# Patient Record
Sex: FEMALE | Race: WHITE | NOT HISPANIC OR LATINO
[De-identification: names, ages, dates, MRNs, and addresses within clinical notes are randomized per-mention and may not be internally consistent; named-entity substitution may affect disease eponyms.]

---

## 2018-11-28 ENCOUNTER — APPOINTMENT (OUTPATIENT)
Dept: GYNECOLOGIC ONCOLOGY | Facility: CLINIC | Age: 68
End: 2018-11-28
Payer: MEDICARE

## 2018-11-28 VITALS
HEART RATE: 48 BPM | BODY MASS INDEX: 27.51 KG/M2 | SYSTOLIC BLOOD PRESSURE: 148 MMHG | WEIGHT: 149.5 LBS | DIASTOLIC BLOOD PRESSURE: 69 MMHG | HEIGHT: 62 IN | OXYGEN SATURATION: 93 %

## 2018-11-28 DIAGNOSIS — N83.202 UNSPECIFIED OVARIAN CYST, LEFT SIDE: ICD-10-CM

## 2018-11-28 DIAGNOSIS — Z87.39 PERSONAL HISTORY OF OTHER DISEASES OF THE MUSCULOSKELETAL SYSTEM AND CONNECTIVE TISSUE: ICD-10-CM

## 2018-11-28 DIAGNOSIS — Z86.79 PERSONAL HISTORY OF OTHER DISEASES OF THE CIRCULATORY SYSTEM: ICD-10-CM

## 2018-11-28 DIAGNOSIS — Z78.9 OTHER SPECIFIED HEALTH STATUS: ICD-10-CM

## 2018-11-28 DIAGNOSIS — Z80.3 FAMILY HISTORY OF MALIGNANT NEOPLASM OF BREAST: ICD-10-CM

## 2018-11-28 PROCEDURE — 99205 OFFICE O/P NEW HI 60 MIN: CPT

## 2019-01-10 PROBLEM — Z80.3 FAMILY HISTORY OF MALIGNANT NEOPLASM OF BREAST: Status: ACTIVE | Noted: 2019-01-10

## 2019-01-10 PROBLEM — Z78.9 NO HISTORY OF ALCOHOL USE: Status: ACTIVE | Noted: 2019-01-10

## 2019-01-10 PROBLEM — Z87.39 HISTORY OF OSTEOPENIA: Status: RESOLVED | Noted: 2019-01-10 | Resolved: 2019-01-10

## 2019-01-10 PROBLEM — Z86.79 HISTORY OF HYPERTENSION: Status: RESOLVED | Noted: 2019-01-10 | Resolved: 2019-01-10

## 2019-06-20 ENCOUNTER — OUTPATIENT (OUTPATIENT)
Dept: OUTPATIENT SERVICES | Facility: HOSPITAL | Age: 69
LOS: 1 days | Discharge: HOME | End: 2019-06-20

## 2019-06-20 ENCOUNTER — RESULT REVIEW (OUTPATIENT)
Age: 69
End: 2019-06-20

## 2019-06-20 RX ORDER — KETOROLAC TROMETHAMINE 30 MG/ML
1 SYRINGE (ML) INJECTION
Qty: 12 | Refills: 0
Start: 2019-06-20 | End: 2019-06-23

## 2019-06-26 LAB — SURGICAL PATHOLOGY STUDY: SIGNIFICANT CHANGE UP

## 2019-11-23 ENCOUNTER — TRANSCRIPTION ENCOUNTER (OUTPATIENT)
Age: 69
End: 2019-11-23

## 2020-01-07 ENCOUNTER — INPATIENT (INPATIENT)
Facility: HOSPITAL | Age: 70
LOS: 2 days | Discharge: HOME | End: 2020-01-10
Attending: INTERNAL MEDICINE | Admitting: INTERNAL MEDICINE
Payer: MEDICARE

## 2020-01-07 VITALS
OXYGEN SATURATION: 95 % | TEMPERATURE: 101 F | WEIGHT: 145.06 LBS | HEART RATE: 154 BPM | SYSTOLIC BLOOD PRESSURE: 109 MMHG | DIASTOLIC BLOOD PRESSURE: 70 MMHG | RESPIRATION RATE: 20 BRPM

## 2020-01-07 DIAGNOSIS — Z90.49 ACQUIRED ABSENCE OF OTHER SPECIFIED PARTS OF DIGESTIVE TRACT: Chronic | ICD-10-CM

## 2020-01-07 LAB
ALBUMIN SERPL ELPH-MCNC: 4.3 G/DL — SIGNIFICANT CHANGE UP (ref 3.5–5.2)
ALP SERPL-CCNC: 45 U/L — SIGNIFICANT CHANGE UP (ref 30–115)
ALT FLD-CCNC: 17 U/L — SIGNIFICANT CHANGE UP (ref 0–41)
ANION GAP SERPL CALC-SCNC: 17 MMOL/L — HIGH (ref 7–14)
APPEARANCE UR: CLEAR — SIGNIFICANT CHANGE UP
APTT BLD: 34.2 SEC — SIGNIFICANT CHANGE UP (ref 27–39.2)
AST SERPL-CCNC: 36 U/L — SIGNIFICANT CHANGE UP (ref 0–41)
BACTERIA # UR AUTO: ABNORMAL
BASE EXCESS BLDV CALC-SCNC: 2.1 MMOL/L — HIGH (ref -2–2)
BASOPHILS # BLD AUTO: 0.04 K/UL — SIGNIFICANT CHANGE UP (ref 0–0.2)
BASOPHILS NFR BLD AUTO: 0.3 % — SIGNIFICANT CHANGE UP (ref 0–1)
BILIRUB SERPL-MCNC: 0.5 MG/DL — SIGNIFICANT CHANGE UP (ref 0.2–1.2)
BILIRUB UR-MCNC: NEGATIVE — SIGNIFICANT CHANGE UP
BUN SERPL-MCNC: 15 MG/DL — SIGNIFICANT CHANGE UP (ref 10–20)
CA-I SERPL-SCNC: 1.17 MMOL/L — SIGNIFICANT CHANGE UP (ref 1.12–1.3)
CALCIUM SERPL-MCNC: 9 MG/DL — SIGNIFICANT CHANGE UP (ref 8.5–10.1)
CHLORIDE SERPL-SCNC: 100 MMOL/L — SIGNIFICANT CHANGE UP (ref 98–110)
CO2 SERPL-SCNC: 21 MMOL/L — SIGNIFICANT CHANGE UP (ref 17–32)
COLOR SPEC: YELLOW — SIGNIFICANT CHANGE UP
CREAT SERPL-MCNC: 0.9 MG/DL — SIGNIFICANT CHANGE UP (ref 0.7–1.5)
DIFF PNL FLD: SIGNIFICANT CHANGE UP
EOSINOPHIL # BLD AUTO: 0 K/UL — SIGNIFICANT CHANGE UP (ref 0–0.7)
EOSINOPHIL NFR BLD AUTO: 0 % — SIGNIFICANT CHANGE UP (ref 0–8)
EPI CELLS # UR: 4 /HPF — SIGNIFICANT CHANGE UP (ref 0–5)
FLU A RESULT: NEGATIVE — SIGNIFICANT CHANGE UP
FLU A RESULT: NEGATIVE — SIGNIFICANT CHANGE UP
FLUAV AG NPH QL: NEGATIVE — SIGNIFICANT CHANGE UP
FLUBV AG NPH QL: NEGATIVE — SIGNIFICANT CHANGE UP
GAS PNL BLDV: 139 MMOL/L — SIGNIFICANT CHANGE UP (ref 136–145)
GAS PNL BLDV: SIGNIFICANT CHANGE UP
GAS PNL BLDV: SIGNIFICANT CHANGE UP
GLUCOSE SERPL-MCNC: 133 MG/DL — HIGH (ref 70–99)
GLUCOSE UR QL: NEGATIVE — SIGNIFICANT CHANGE UP
HCO3 BLDV-SCNC: 28 MMOL/L — SIGNIFICANT CHANGE UP (ref 22–29)
HCT VFR BLD CALC: 44.4 % — SIGNIFICANT CHANGE UP (ref 37–47)
HCT VFR BLDA CALC: 47 % — HIGH (ref 34–44)
HGB BLD CALC-MCNC: 15.3 G/DL — SIGNIFICANT CHANGE UP (ref 14–18)
HGB BLD-MCNC: 15.1 G/DL — SIGNIFICANT CHANGE UP (ref 12–16)
HYALINE CASTS # UR AUTO: 4 /LPF — SIGNIFICANT CHANGE UP (ref 0–7)
IMM GRANULOCYTES NFR BLD AUTO: 0.7 % — HIGH (ref 0.1–0.3)
INR BLD: 1.12 RATIO — SIGNIFICANT CHANGE UP (ref 0.65–1.3)
KETONES UR-MCNC: NEGATIVE — SIGNIFICANT CHANGE UP
LACTATE BLDV-MCNC: 2 MMOL/L — HIGH (ref 0.5–1.6)
LACTATE SERPL-SCNC: 1.5 MMOL/L — SIGNIFICANT CHANGE UP (ref 0.7–2)
LEUKOCYTE ESTERASE UR-ACNC: ABNORMAL
LYMPHOCYTES # BLD AUTO: 1.04 K/UL — LOW (ref 1.2–3.4)
LYMPHOCYTES # BLD AUTO: 8.4 % — LOW (ref 20.5–51.1)
MCHC RBC-ENTMCNC: 30.4 PG — SIGNIFICANT CHANGE UP (ref 27–31)
MCHC RBC-ENTMCNC: 34 G/DL — SIGNIFICANT CHANGE UP (ref 32–37)
MCV RBC AUTO: 89.3 FL — SIGNIFICANT CHANGE UP (ref 81–99)
MONOCYTES # BLD AUTO: 0.64 K/UL — HIGH (ref 0.1–0.6)
MONOCYTES NFR BLD AUTO: 5.1 % — SIGNIFICANT CHANGE UP (ref 1.7–9.3)
NEUTROPHILS # BLD AUTO: 10.62 K/UL — HIGH (ref 1.4–6.5)
NEUTROPHILS NFR BLD AUTO: 85.5 % — HIGH (ref 42.2–75.2)
NITRITE UR-MCNC: NEGATIVE — SIGNIFICANT CHANGE UP
NRBC # BLD: 0 /100 WBCS — SIGNIFICANT CHANGE UP (ref 0–0)
NT-PROBNP SERPL-SCNC: 2214 PG/ML — HIGH (ref 0–300)
PCO2 BLDV: 47 MMHG — SIGNIFICANT CHANGE UP (ref 41–51)
PH BLDV: 7.38 — SIGNIFICANT CHANGE UP (ref 7.26–7.43)
PH UR: 6.5 — SIGNIFICANT CHANGE UP (ref 5–8)
PLATELET # BLD AUTO: 208 K/UL — SIGNIFICANT CHANGE UP (ref 130–400)
PO2 BLDV: 25 MMHG — SIGNIFICANT CHANGE UP (ref 20–40)
POTASSIUM BLDV-SCNC: 3.7 MMOL/L — SIGNIFICANT CHANGE UP (ref 3.3–5.6)
POTASSIUM SERPL-MCNC: 4.1 MMOL/L — SIGNIFICANT CHANGE UP (ref 3.5–5)
POTASSIUM SERPL-SCNC: 4.1 MMOL/L — SIGNIFICANT CHANGE UP (ref 3.5–5)
PROT SERPL-MCNC: 6.7 G/DL — SIGNIFICANT CHANGE UP (ref 6–8)
PROT UR-MCNC: ABNORMAL
PROTHROM AB SERPL-ACNC: 12.9 SEC — HIGH (ref 9.95–12.87)
RBC # BLD: 4.97 M/UL — SIGNIFICANT CHANGE UP (ref 4.2–5.4)
RBC # FLD: 12.5 % — SIGNIFICANT CHANGE UP (ref 11.5–14.5)
RBC CASTS # UR COMP ASSIST: 2 /HPF — SIGNIFICANT CHANGE UP (ref 0–4)
RSV RESULT: NEGATIVE — SIGNIFICANT CHANGE UP
RSV RNA RESP QL NAA+PROBE: NEGATIVE — SIGNIFICANT CHANGE UP
SAO2 % BLDV: 45 % — SIGNIFICANT CHANGE UP
SODIUM SERPL-SCNC: 138 MMOL/L — SIGNIFICANT CHANGE UP (ref 135–146)
SP GR SPEC: 1.01 — SIGNIFICANT CHANGE UP (ref 1.01–1.02)
TROPONIN T SERPL-MCNC: <0.01 NG/ML — SIGNIFICANT CHANGE UP
UROBILINOGEN FLD QL: SIGNIFICANT CHANGE UP
WBC # BLD: 12.43 K/UL — HIGH (ref 4.8–10.8)
WBC # FLD AUTO: 12.43 K/UL — HIGH (ref 4.8–10.8)
WBC UR QL: 5 /HPF — SIGNIFICANT CHANGE UP (ref 0–5)

## 2020-01-07 PROCEDURE — 93010 ELECTROCARDIOGRAM REPORT: CPT | Mod: 76

## 2020-01-07 PROCEDURE — 71045 X-RAY EXAM CHEST 1 VIEW: CPT | Mod: 26

## 2020-01-07 PROCEDURE — 93306 TTE W/DOPPLER COMPLETE: CPT | Mod: 26

## 2020-01-07 PROCEDURE — 99291 CRITICAL CARE FIRST HOUR: CPT

## 2020-01-07 PROCEDURE — 99222 1ST HOSP IP/OBS MODERATE 55: CPT | Mod: AI,GC

## 2020-01-07 PROCEDURE — 99291 CRITICAL CARE FIRST HOUR: CPT | Mod: GC

## 2020-01-07 RX ORDER — SODIUM CHLORIDE 9 MG/ML
2000 INJECTION, SOLUTION INTRAVENOUS ONCE
Refills: 0 | Status: COMPLETED | OUTPATIENT
Start: 2020-01-07 | End: 2020-01-07

## 2020-01-07 RX ORDER — SODIUM CHLORIDE 9 MG/ML
1000 INJECTION, SOLUTION INTRAVENOUS ONCE
Refills: 0 | Status: COMPLETED | OUTPATIENT
Start: 2020-01-07 | End: 2020-01-07

## 2020-01-07 RX ORDER — DILTIAZEM HCL 120 MG
5 CAPSULE, EXT RELEASE 24 HR ORAL
Qty: 125 | Refills: 0 | Status: DISCONTINUED | OUTPATIENT
Start: 2020-01-07 | End: 2020-01-07

## 2020-01-07 RX ORDER — CEFTRIAXONE 500 MG/1
1000 INJECTION, POWDER, FOR SOLUTION INTRAMUSCULAR; INTRAVENOUS EVERY 24 HOURS
Refills: 0 | Status: DISCONTINUED | OUTPATIENT
Start: 2020-01-08 | End: 2020-01-08

## 2020-01-07 RX ORDER — APIXABAN 2.5 MG/1
5 TABLET, FILM COATED ORAL ONCE
Refills: 0 | Status: COMPLETED | OUTPATIENT
Start: 2020-01-07 | End: 2020-01-07

## 2020-01-07 RX ORDER — AZITHROMYCIN 500 MG/1
500 TABLET, FILM COATED ORAL EVERY 24 HOURS
Refills: 0 | Status: DISCONTINUED | OUTPATIENT
Start: 2020-01-08 | End: 2020-01-08

## 2020-01-07 RX ORDER — ACETAMINOPHEN 500 MG
650 TABLET ORAL ONCE
Refills: 0 | Status: COMPLETED | OUTPATIENT
Start: 2020-01-07 | End: 2020-01-07

## 2020-01-07 RX ORDER — APIXABAN 2.5 MG/1
5 TABLET, FILM COATED ORAL EVERY 12 HOURS
Refills: 0 | Status: DISCONTINUED | OUTPATIENT
Start: 2020-01-07 | End: 2020-01-10

## 2020-01-07 RX ORDER — ACETAMINOPHEN 500 MG
975 TABLET ORAL ONCE
Refills: 0 | Status: COMPLETED | OUTPATIENT
Start: 2020-01-07 | End: 2020-01-07

## 2020-01-07 RX ORDER — LEVOTHYROXINE SODIUM 125 MCG
50 TABLET ORAL DAILY
Refills: 0 | Status: DISCONTINUED | OUTPATIENT
Start: 2020-01-07 | End: 2020-01-10

## 2020-01-07 RX ORDER — DILTIAZEM HCL 120 MG
20 CAPSULE, EXT RELEASE 24 HR ORAL ONCE
Refills: 0 | Status: COMPLETED | OUTPATIENT
Start: 2020-01-07 | End: 2020-01-07

## 2020-01-07 RX ORDER — AZITHROMYCIN 500 MG/1
500 TABLET, FILM COATED ORAL ONCE
Refills: 0 | Status: COMPLETED | OUTPATIENT
Start: 2020-01-07 | End: 2020-01-07

## 2020-01-07 RX ORDER — CEFTRIAXONE 500 MG/1
1000 INJECTION, POWDER, FOR SOLUTION INTRAMUSCULAR; INTRAVENOUS ONCE
Refills: 0 | Status: COMPLETED | OUTPATIENT
Start: 2020-01-07 | End: 2020-01-07

## 2020-01-07 RX ADMIN — Medication 20 MILLIGRAM(S): at 09:00

## 2020-01-07 RX ADMIN — AZITHROMYCIN 255 MILLIGRAM(S): 500 TABLET, FILM COATED ORAL at 10:17

## 2020-01-07 RX ADMIN — Medication 975 MILLIGRAM(S): at 19:07

## 2020-01-07 RX ADMIN — SODIUM CHLORIDE 1000 MILLILITER(S): 9 INJECTION, SOLUTION INTRAVENOUS at 19:07

## 2020-01-07 RX ADMIN — Medication 5 MG/HR: at 09:29

## 2020-01-07 RX ADMIN — SODIUM CHLORIDE 2000 MILLILITER(S): 9 INJECTION, SOLUTION INTRAVENOUS at 08:36

## 2020-01-07 RX ADMIN — APIXABAN 5 MILLIGRAM(S): 2.5 TABLET, FILM COATED ORAL at 09:32

## 2020-01-07 RX ADMIN — Medication 650 MILLIGRAM(S): at 08:36

## 2020-01-07 RX ADMIN — CEFTRIAXONE 100 MILLIGRAM(S): 500 INJECTION, POWDER, FOR SOLUTION INTRAMUSCULAR; INTRAVENOUS at 10:10

## 2020-01-07 RX ADMIN — Medication 975 MILLIGRAM(S): at 20:15

## 2020-01-07 NOTE — ED PROVIDER NOTE - OBJECTIVE STATEMENT
68 y/o F PMHx HTN and hypothyroidism presents to ED with palpitations since 4am. Pt reports fevers (Tmax 100.4F), non-productive cough, generalized weakness x1 week. Denies chest pain, SOB, vomiting.

## 2020-01-07 NOTE — CONSULT NOTE ADULT - ASSESSMENT
Patient is a 69y old  Female who presents with a chief complaint of dyspnea and palpitations. URTI with probably super-imposed pneumonia. No known cardiac hx except for HTN. Found to be in a-fib with RVR (hemodynamically stable). Started on cardizem gtt with improvement in her HR. CHADSVASC 3.     New-onset a-fib with RVR   HTN  Hypothyroidism    Recommendations:   Continue cardizem gtt, titrate to HR <110 bpm  Eliquis 5 mg q12h   RVP   TSH, T3/T4  2d echo  DEONDRE/DCCV once URTI/pneumonia treated  DOROTHY evaluation as o/p Patient is a 69y old  Female who presents with a chief complaint of dyspnea and palpitations. URTI with probably super-imposed pneumonia. No known cardiac hx except for HTN. Found to be in a-fib with RVR (hemodynamically stable). Started on cardizem gtt with improvement in her HR. CHADSVASC 3.     New-onset a-fib with RVR   HTN  Hypothyroidism    Recommendations:   Continue cardizem gtt, titrate to HR <110 bpm  can add bb if BP tolerates or load digoxin if BP is marginal.    Eliquis 5 mg q12h   RVP   TSH, T3/T4  2d echo  DEONDRE/DCCV once URTI/pneumonia treated  DOROTHY evaluation as o/p    agree

## 2020-01-07 NOTE — ED PROVIDER NOTE - PROGRESS NOTE DETAILS
Remains tachycardic despite cardizem bolus. Now on cardizem drip. Received 2L LR. Lactate 2.0. CXR with left sided infiltrate. Flu negative. Will start IV abx for CAP. Requires admission to monitored setting. on Cardizem drip, pt stable, LLL infiltrate on Xray, will admit for new onset afib, Cardiology following

## 2020-01-07 NOTE — ED PROVIDER NOTE - CARE PLAN
Principal Discharge DX:	Atrial fibrillation with rapid ventricular response  Secondary Diagnosis:	Fever

## 2020-01-07 NOTE — H&P ADULT - ATTENDING COMMENTS
Patient is seen and examined independently.  I have fully participated in the care of this patient.  I have reviewed all pertinent clinical information, including history, physical exam, plan and note.   I have reviewed all pertinent clinical information and reviewed all relevant imaging and diagnostic studies personally. I agree with resident note above and plan of care -edited and corrected where applicable- with addition:    PAST MEDICAL & SURGICAL HISTORY:  Hypothyroid  Hypertension  S/P cholecystectomy      Vitals:  T(F): 100.4 (20 @ 10:09)  HR: 141 (20 @ 10:09)  BP: 109/85 (20 @ 10:09)  RR: 18 (20 @ 10:09)  SpO2: 93% (20 @ 10:09)    TESTS & MEASUREMENTS:                        15.1   12.43 )-----------( 208      ( 2020 08:32 )             44.4     PT/INR - ( 2020 08:32 )   PT: 12.90 sec;   INR: 1.12 ratio         PTT - ( 2020 08:32 )  PTT:34.2 sec      138  |  100  |  15  ----------------------------<  133<H>  4.1   |  21  |  0.9    Ca    9.0      2020 08:32    TPro  6.7  /  Alb  4.3  /  TBili  0.5  /  DBili  x   /  AST  36  /  ALT  17  /  AlkPhos  45  -07    LIVER FUNCTIONS - ( 2020 08:32 )  Alb: 4.3 g/dL / Pro: 6.7 g/dL / ALK PHOS: 45 U/L / ALT: 17 U/L / AST: 36 U/L / GGT: x           CARDIAC MARKERS ( 2020 08:59 )  x     / <0.01 ng/mL / x     / x     / x            Urinalysis Basic - ( 2020 10:58 )    Color: Yellow / Appearance: Clear / S.012 / pH: x  Gluc: x / Ketone: Negative  / Bili: Negative / Urobili: <2 mg/dL   Blood: x / Protein: 30 mg/dL / Nitrite: Negative   Leuk Esterase: Moderate / RBC: 2 /HPF / WBC 5 /HPF   Sq Epi: x / Non Sq Epi: 4 /HPF / Bacteria: Few        In summary:  HEALTH ISSUES - PROBLEM Dx: Patient is seen and examined independently.  I have fully participated in the care of this patient.  I have reviewed all pertinent clinical information, including history, physical exam, plan and note.   I have reviewed all pertinent clinical information and reviewed all relevant imaging and diagnostic studies personally. I agree with resident note above and plan of care -edited and corrected where applicable- with addition:    PAST MEDICAL & SURGICAL HISTORY:  Hypothyroid  Hypertension  S/P cholecystectomy      Vitals:  T(F): 100.4 (20 @ 10:09)  HR: 141 (20 @ 10:09)  BP: 109/85 (20 @ 10:09)  RR: 18 (20 @ 10:09)  SpO2: 93% (20 @ 10:09)    TESTS & MEASUREMENTS:                        15.1   12.43 )-----------( 208      ( 2020 08:32 )             44.4     PT/INR - ( 2020 08:32 )   PT: 12.90 sec;   INR: 1.12 ratio         PTT - ( 2020 08:32 )  PTT:34.2 sec      138  |  100  |  15  ----------------------------<  133<H>  4.1   |  21  |  0.9    Ca    9.0      2020 08:32    TPro  6.7  /  Alb  4.3  /  TBili  0.5  /  DBili  x   /  AST  36  /  ALT  17  /  AlkPhos  45  -07    LIVER FUNCTIONS - ( 2020 08:32 )  Alb: 4.3 g/dL / Pro: 6.7 g/dL / ALK PHOS: 45 U/L / ALT: 17 U/L / AST: 36 U/L / GGT: x           CARDIAC MARKERS ( 2020 08:59 )  x     / <0.01 ng/mL / x     / x     / x            Urinalysis Basic - ( 2020 10:58 )    Color: Yellow / Appearance: Clear / S.012 / pH: x  Gluc: x / Ketone: Negative  / Bili: Negative / Urobili: <2 mg/dL   Blood: x / Protein: 30 mg/dL / Nitrite: Negative   Leuk Esterase: Moderate / RBC: 2 /HPF / WBC 5 /HPF   Sq Epi: x / Non Sq Epi: 4 /HPF / Bacteria: Few        In summary:  HEALTH ISSUES - PROBLEM Dx:  .. Suspected bacterial pneumonia superimposed on a recent viral illness. Due to age, patient is at increased risk for GNR (non-pseudomonal); currently on broad-spectrum intravenous antibiotics therapy   .. New onset AFib, no history of structural heart disease. Patient will have rate control therapy at this point ( Calcium Channel Blocker ) and cardiac telemonitoring. Decision regarding long-term management (including anticoagulant therapy) will be dictated by evidence of structural heart disease and subsequent follow up.     Rest of plan of care as per note matildaove  Case discussed with patient's spouse at baseline; he had no further questions and they are in agreement regarding plan of care

## 2020-01-07 NOTE — CONSULT NOTE ADULT - SUBJECTIVE AND OBJECTIVE BOX
Date of Admission: 1/7/2019    CHIEF COMPLAINT: Palpitations    HISTORY OF PRESENT ILLNESS:     69 female with HTN and hypothyroidism presenting for palpitations and SOB for the past 4 days. She started getting URT symptoms about a week ago, she felt it was the flu. Symptoms got worse, associated with dyspnea and palpitations that started last night. She reports chills and subjective fevers. Denies chest pain, LE swelling, PND or orthopnea. Denies any cardiac hx.     PAST MEDICAL & SURGICAL HISTORY:  Hypothyroidism  HTN  DL    FAMILY HISTORY:  [x] no pertinent family history of premature cardiovascular disease in first degree relatives.  Mother:   Father:   Siblings:     SOCIAL HISTORY:    [x] Non-smoker  [ ] Smoker  [ ] Alcohol: social    Allergies    No Known Allergies    Intolerances    	    REVIEW OF SYSTEMS:  CONSTITUTIONAL: see HPI  CARDIOLOGY: see HPI  RESPIRATORY: see HPI  NEUROLOGICAL: denies weakness, no focal deficits to report.   GI: no BRBPR, no N,V, diarrhea.    PSYCHIATRY: normal mood and affect  HEENT: no nasal discharge, no ecchymosis  SKIN: no ecchymosis, no breakdown  MUSCULOSKELETAL: Full range of motion x4.     PHYSICAL EXAM:  T(C): 38 (01-07-20 @ 10:09), Max: 38.1 (01-07-20 @ 08:07)  HR: 141 (01-07-20 @ 10:09) (141 - 170)  BP: 109/85 (01-07-20 @ 10:09) (109/70 - 126/73)  RR: 18 (01-07-20 @ 10:09) (18 - 20)  SpO2: 93% (01-07-20 @ 10:09) (93% - 95%)  Wt(kg): --  I&O's Summary      General Appearance: well appearing, normal for age and gender. 	  Neck: normal JVP, no bruit.   Eyes: No xanthomalasia, Extra Ocular muscles intact.   Cardiovascular: irregularly irregular S1 S2, No JVD, No murmurs, No edema  Respiratory: left mid-lung field crackles	  Psychiatry: Alert and oriented x 3, Mood & affect appropriate  Gastrointestinal:  Soft, Non-tender  Skin/Integumen: No rashes, No ecchymoses, No cyanosis	  Neurologic: Non-focal  Musculoskeletal/extremities: Normal range of motion, No clubbing, cyanosis or edema  Vascular: Peripheral pulses palpable 2+ bilaterally    LABS:	 	                          15.1   12.43 )-----------( 208      ( 07 Jan 2020 08:32 )             44.4     01-07    138  |  100  |  15  ----------------------------<  133<H>  4.1   |  21  |  0.9    Ca    9.0      07 Jan 2020 08:32    TPro  6.7  /  Alb  4.3  /  TBili  0.5  /  DBili  x   /  AST  36  /  ALT  17  /  AlkPhos  45  01-07    CARDIAC MARKERS ( 07 Jan 2020 08:59 )  x     / <0.01 ng/mL / x     / x     / x          PT/INR - ( 07 Jan 2020 08:32 )   PT: 12.90 sec;   INR: 1.12 ratio         PTT - ( 07 Jan 2020 08:32 )  PTT:34.2 sec          TELEMETRY EVENTS: 	A-fib at 100-130 bpm    ECG:  	  RADIOLOGY:  CXR: RACHEL opacity  OTHER: 	    	    Home Medications:  Amlodipine 5 mg daily  Bystolic 10 mg daily  Losartan 100 mg daily  Levothyroxine 50 mcg daily    MEDICATIONS  (STANDING):  diltiazem Infusion 5 mG/Hr (5 mL/Hr) IV Continuous <Continuous>    MEDICATIONS  (PRN):

## 2020-01-07 NOTE — CONSULT NOTE ADULT - SUBJECTIVE AND OBJECTIVE BOX
Patient is a 69y old  Female who presents with a chief complaint of palpitation and SOB for 1 day preceded by 1 week h/o of cough and fever (2020 11:28)        HPI:  69 years old female pt with past medical hx of HTN and hypothyroidism came to ER because of 1 day h/o of palpitation.  As per patient for last 1 week, he is sick, has flu like symptoms cough associated with whitish to yellow sputum, fever associated with chills, she thought its flu, continued supportive therapy with anti tussives, but her condition didnt improved she remained febrile, yesterday she started having palpitation denies any chest pain , has minimal SOB with exertion.  She never had palpitations in the past. She saw her primary 3 months ago, all her labs were normal  She denies any lower leg swelling.  she denies any headaches visual problem, no weakness, no GI or urinary complaints  denies any sick contact or recent travel.  She has been mobile, compliant with her medications.  While in ER, she was found to be in Afib with RVR, started on cardizem drip, she was febrile to 38.1, started on ceftriaxone and azithromycin, s/p 2 L IV bolus (2020 11:28)      EP consulted for     REVIEW OF SYSTEMS    [ ] A ten-point review of systems was otherwise negative except as noted.  [ ] Due to altered mental status/intubation, subjective information were not able to be obtained from the patient. History was obtained, to the extent possible, from review of the chart and collateral sources of information.      PAST MEDICAL & SURGICAL HISTORY:  Hypothyroid  Hypertension  S/P cholecystectomy      Home Medications:  amLODIPine 5 mg oral tablet: 1 tab(s) orally once a day (2020 11:35)  bisoprolol 10 mg oral tablet: 1 tab(s) orally once a day (2020 11:35)  levothyroxine 50 mcg (0.05 mg) oral tablet: 1 tab(s) orally once a day (2020 11:35)  losartan 100 mg oral tablet: 1 tab(s) orally once a day (2020 11:35)      Allergies:  No Known Allergies    FAMILY HISTORY:  FH: heart disease: father has pacemaker      SOCIAL HISTORY:  CIGARETTES:  ALCOHOL:      MEDICATIONS  (STANDING):  apixaban 5 milliGRAM(s) Oral every 12 hours  diltiazem Infusion 5 mG/Hr (5 mL/Hr) IV Continuous <Continuous>  levothyroxine 50 MICROGram(s) Oral daily    MEDICATIONS  (PRN):      Vital Signs Last 24 Hrs  T(C): 38 (2020 10:09), Max: 38.1 (2020 08:07)  T(F): 100.4 (2020 10:09), Max: 100.6 (2020 08:07)  HR: 141 (2020 10:09) (141 - 170)  BP: 109/85 (2020 10:09) (109/70 - 126/73)  BP(mean): --  RR: 18 (2020 10:09) (18 - 20)  SpO2: 93% (2020 10:09) (93% - 95%)    PHYSICAL EXAM:    GENERAL: In no apparent distress, well nourished, and hydrated.  HEART: Regular rate and rhythm; No murmurs, rubs, or gallops.  PULMONARY: Clear to auscultation and perfusion.  No rales, wheezing, or rhonchi bilaterally.  ABDOMEN: Soft, Nontender, Nondistended; Bowel sounds present  EXTREMITIES:  2+ Peripheral Pulses, No clubbing, cyanosis, or edema  NEUROLOGICAL: Grossly nonfocal      INTERPRETATION OF TELEMETRY:    ECG:    I&O's Detail      LABS:                        15.1   12.43 )-----------( 208      ( 2020 08:32 )             44.4     01-07    138  |  100  |  15  ----------------------------<  133<H>  4.1   |  21  |  0.9    Ca    9.0      2020 08:32    TPro  6.7  /  Alb  4.3  /  TBili  0.5  /  DBili  x   /  AST  36  /  ALT  17  /  AlkPhos  45  01-07    CARDIAC MARKERS ( 2020 08:59 )  x     / <0.01 ng/mL / x     / x     / x          PT/INR - ( 2020 08:32 )   PT: 12.90 sec;   INR: 1.12 ratio         PTT - ( 2020 08:32 )  PTT:34.2 sec  Urinalysis Basic - ( 2020 10:58 )    Color: Yellow / Appearance: Clear / S.012 / pH: x  Gluc: x / Ketone: Negative  / Bili: Negative / Urobili: <2 mg/dL   Blood: x / Protein: 30 mg/dL / Nitrite: Negative   Leuk Esterase: Moderate / RBC: 2 /HPF / WBC 5 /HPF   Sq Epi: x / Non Sq Epi: 4 /HPF / Bacteria: Few      BNPSerum Pro-Brain Natriuretic Peptide: 2214 pg/mL ( @ 08:59)      RADIOLOGY & ADDITIONAL STUDIES: Patient is a 69y old  Female who presents with a chief complaint of palpitation and SOB for 1 day preceded by 1 week h/o of cough and fever (2020 11:28)    HPI:  69 years old female pt with past medical hx of HTN and hypothyroidism came to ER because of 1 day h/o of palpitation.  As per patient for last 1 week, he is sick, has flu like symptoms cough associated with whitish to yellow sputum, fever associated with chills, she thought its flu, continued supportive therapy with anti tussives, but her condition didnt improved she remained febrile, yesterday she started having palpitation denies any chest pain , has minimal SOB with exertion.  She never had palpitations in the past. She saw her primary 3 months ago, all her labs were normal  She denies any lower leg swelling. She denies any headaches visual problem, no weakness, no GI or urinary complaints. Denies any sick contact or recent travel.  She has been mobile, compliant with her medications.    While in ER, she was found to be in Afib with RVR, started on cardizem drip, she was febrile to 38.1, started on ceftriaxone and azithromycin, s/p 2 L IV bolus (2020 11:28)      EP consulted for new onset AF wuth RVR.     REVIEW OF SYSTEMS  A ten-point review of systems was otherwise negative except as noted.        PAST MEDICAL & SURGICAL HISTORY:  Hypothyroid  Hypertension  S/P cholecystectomy      Home Medications:  amLODIPine 5 mg oral tablet: 1 tab(s) orally once a day (2020 11:35)  bisoprolol 10 mg oral tablet: 1 tab(s) orally once a day (2020 11:35)  levothyroxine 50 mcg (0.05 mg) oral tablet: 1 tab(s) orally once a day (2020 11:35)  losartan 100 mg oral tablet: 1 tab(s) orally once a day (2020 11:35)      Allergies:  No Known Allergies    FAMILY HISTORY:  FH: heart disease: father has pacemaker      SOCIAL HISTORY:  CIGARETTES:  ALCOHOL:      MEDICATIONS  (STANDING):  apixaban 5 milliGRAM(s) Oral every 12 hours  diltiazem Infusion 5 mG/Hr (5 mL/Hr) IV Continuous <Continuous>  levothyroxine 50 MICROGram(s) Oral daily    MEDICATIONS  (PRN):      Vital Signs Last 24 Hrs  T(C): 38 (2020 10:09), Max: 38.1 (2020 08:07)  T(F): 100.4 (2020 10:09), Max: 100.6 (2020 08:07)  HR: 141 (2020 10:09) (141 - 170)  BP: 109/85 (2020 10:09) (109/70 - 126/73)  BP(mean): --  RR: 18 (2020 10:09) (18 - 20)  SpO2: 93% (2020 10:09) (93% - 95%)    PHYSICAL EXAM:    GENERAL: In no apparent distress, well nourished, and hydrated.  HEART: iRRegular rate and rhythm; No murmurs, rubs, or gallops.  PULMONARY: Clear to auscultation and perfusion.  No rales, wheezing, or rhonchi bilaterally.  ABDOMEN: Soft, Nontender, Nondistended; Bowel sounds present  EXTREMITIES:  2+ Peripheral Pulses, No clubbing, cyanosis, or edema  NEUROLOGICAL: Grossly nonfocal. AO x 3, THORNTON, speech clear.      INTERPRETATION OF TELEMETRY:  BPM    ECG:  < from: 12 Lead ECG (20 @ 08:11) >  Ventricular Rate 154 BPM    Atrial Rate 159 BPM    QRS Duration 86 ms    Q-T Interval 286 ms    QTC Calculation(Bezet) 458 ms    R Axis -58 degrees    T Axis 85 degrees    Diagnosis Line Atrial fibrillation with rapid ventricular response  Left anterior fascicular block  Nonspecific ST and T wave abnormality  Abnormal ECG    Confirmed by JOSE CARLOS BOSCH MD ([id number]) on 2020 10:43:25 AM    < end of copied text >      I&O's Detail      LABS:                        15.1   12.43 )-----------( 208      ( 2020 08:32 )             44.4     01-07    138  |  100  |  15  ----------------------------<  133<H>  4.1   |  21  |  0.9    Ca    9.0      2020 08:32    TPro  6.7  /  Alb  4.3  /  TBili  0.5  /  DBili  x   /  AST  36  /  ALT  17  /  AlkPhos  45  01-07    CARDIAC MARKERS ( 2020 08:59 )  x     / <0.01 ng/mL / x     / x     / x          PT/INR - ( 2020 08:32 )   PT: 12.90 sec;   INR: 1.12 ratio         PTT - ( 2020 08:32 )  PTT:34.2 sec  Urinalysis Basic - ( 2020 10:58 )    Color: Yellow / Appearance: Clear / S.012 / pH: x  Gluc: x / Ketone: Negative  / Bili: Negative / Urobili: <2 mg/dL   Blood: x / Protein: 30 mg/dL / Nitrite: Negative   Leuk Esterase: Moderate / RBC: 2 /HPF / WBC 5 /HPF   Sq Epi: x / Non Sq Epi: 4 /HPF / Bacteria: Few      BNPSerum Pro-Brain Natriuretic Peptide: 2214 pg/mL ( @ 08:59)      RADIOLOGY & ADDITIONAL STUDIES: Patient is a 69y old  Female who presents with a chief complaint of palpitation and SOB for 1 day preceded by 1 week h/o of cough and fever (2020 11:28)    HPI:  69 years old female pt with past medical hx of HTN and hypothyroidism came to ER because of 1 day h/o of palpitation.  As per patient for last 1 week, he is sick, has flu like symptoms cough associated with whitish to yellow sputum, fever associated with chills, she thought its flu, continued supportive therapy with anti tussives, but her condition didnt improved she remained febrile, yesterday she started having palpitation denies any chest pain , has minimal SOB with exertion.  She never had palpitations in the past. She saw her primary 3 months ago, all her labs were normal  She denies any lower leg swelling. She denies any headaches visual problem, no weakness, no GI or urinary complaints. Denies any sick contact or recent travel.  She has been mobile, compliant with her medications.    While in ER, she was found to be in Afib with RVR, started on cardizem drip, she was febrile to 38.1, started on ceftriaxone and azithromycin, s/p 2 L IV bolus (2020 11:28)      EP consulted for new onset AF with RVR.     REVIEW OF SYSTEMS  A ten-point review of systems was otherwise negative except as noted.        PAST MEDICAL & SURGICAL HISTORY:  Hypothyroid  Hypertension  S/P cholecystectomy      Home Medications:  amLODIPine 5 mg oral tablet: 1 tab(s) orally once a day (2020 11:35)  bisoprolol 10 mg oral tablet: 1 tab(s) orally once a day (2020 11:35)  levothyroxine 50 mcg (0.05 mg) oral tablet: 1 tab(s) orally once a day (2020 11:35)  losartan 100 mg oral tablet: 1 tab(s) orally once a day (2020 11:35)      Allergies:  No Known Allergies    FAMILY HISTORY:  FH: heart disease: father has pacemaker      SOCIAL HISTORY:  CIGARETTES:  ALCOHOL:      MEDICATIONS  (STANDING):  apixaban 5 milliGRAM(s) Oral every 12 hours  diltiazem Infusion 5 mG/Hr (5 mL/Hr) IV Continuous <Continuous>  levothyroxine 50 MICROGram(s) Oral daily    MEDICATIONS  (PRN):      Vital Signs Last 24 Hrs  T(C): 38 (2020 10:09), Max: 38.1 (2020 08:07)  T(F): 100.4 (2020 10:09), Max: 100.6 (2020 08:07)  HR: 141 (2020 10:09) (141 - 170)  BP: 109/85 (2020 10:09) (109/70 - 126/73)  BP(mean): --  RR: 18 (2020 10:09) (18 - 20)  SpO2: 93% (2020 10:09) (93% - 95%)    PHYSICAL EXAM:    GENERAL: In no apparent distress, well nourished, and hydrated.  HEART: iRRegular rate and rhythm; No murmurs, rubs, or gallops.  PULMONARY: Clear to auscultation and perfusion.  No rales, wheezing, or rhonchi bilaterally.  ABDOMEN: Soft, Nontender, Nondistended; Bowel sounds present  EXTREMITIES:  2+ Peripheral Pulses, No clubbing, cyanosis, or edema  NEUROLOGICAL: Grossly nonfocal. AO x 3, THORNTON, speech clear.      INTERPRETATION OF TELEMETRY:  BPM    ECG:  < from: 12 Lead ECG (20 @ 08:11) >  Ventricular Rate 154 BPM    Atrial Rate 159 BPM    QRS Duration 86 ms    Q-T Interval 286 ms    QTC Calculation(Bezet) 458 ms    R Axis -58 degrees    T Axis 85 degrees    Diagnosis Line Atrial fibrillation with rapid ventricular response  Left anterior fascicular block  Nonspecific ST and T wave abnormality  Abnormal ECG    Confirmed by JOSE CARLOS BOSCH MD ([id number]) on 2020 10:43:25 AM    < end of copied text >      I&O's Detail      LABS:                        15.1   12.43 )-----------( 208      ( 2020 08:32 )             44.4     01-07    138  |  100  |  15  ----------------------------<  133<H>  4.1   |  21  |  0.9    Ca    9.0      2020 08:32    TPro  6.7  /  Alb  4.3  /  TBili  0.5  /  DBili  x   /  AST  36  /  ALT  17  /  AlkPhos  45  01-07    CARDIAC MARKERS ( 2020 08:59 )  x     / <0.01 ng/mL / x     / x     / x          PT/INR - ( 2020 08:32 )   PT: 12.90 sec;   INR: 1.12 ratio         PTT - ( 2020 08:32 )  PTT:34.2 sec  Urinalysis Basic - ( 2020 10:58 )    Color: Yellow / Appearance: Clear / S.012 / pH: x  Gluc: x / Ketone: Negative  / Bili: Negative / Urobili: <2 mg/dL   Blood: x / Protein: 30 mg/dL / Nitrite: Negative   Leuk Esterase: Moderate / RBC: 2 /HPF / WBC 5 /HPF   Sq Epi: x / Non Sq Epi: 4 /HPF / Bacteria: Few      BNPSerum Pro-Brain Natriuretic Peptide: 2214 pg/mL ( @ 08:59)      RADIOLOGY & ADDITIONAL STUDIES:

## 2020-01-07 NOTE — ED ADULT NURSE REASSESSMENT NOTE - NS ED NURSE REASSESS COMMENT FT1
pt resting, on cardiac monitor, HR 64. denies pain. Temp now 100.9 orally. Will continue to monitor.

## 2020-01-07 NOTE — H&P ADULT - NSHPLABSRESULTS_GEN_ALL_CORE
01-07    138  |  100  |  15  ----------------------------<  133<H>  4.1   |  21  |  0.9    Ca    9.0      07 Jan 2020 08:32    TPro  6.7  /  Alb  4.3  /  TBili  0.5  /  DBili  x   /  AST  36  /  ALT  17  /  AlkPhos  45  01-07                          15.1   12.43 )-----------( 208      ( 07 Jan 2020 08:32 )             44.4     < from: Xray Chest 1 View-PORTABLE IMMEDIATE (01.07.20 @ 10:05) >      Impression:      Left-sided lung opacities. Follow to resolution is recommended.    < end of copied text >

## 2020-01-07 NOTE — H&P ADULT - ASSESSMENT
69 years old female pt with past medical hx of HTN and hypothyroidism came to ER because of 1 day h/o of palpitation, and 1 week H/o of cough and fever.    # Fever and cough for evaluation     CXR left sided opacity     CAP ( community acquired pneumonia)     will keep ceftriaxone and azithromycin     follow sputum and blood culture     RVP    # New onset Afib with RVR     etiology ? thyroid related/ infection induced ischemic     cardiology on board     started on cardizem drip with target HR < 100-110     Eliquis 5 mg BID     2d echocardiogram     TSH. T4    # HTN     bp on lower side    # Hypothyroidism     on levothyroxine 50 mcg daily    # DVT prophylaxis pt on eliquis for Afib    # DASH diet    # activity ambulate as tolerted 69 years old female pt with past medical hx of HTN and hypothyroidism came to ER because of 1 day h/o of palpitation, and 1 week H/o of cough and fever.    # Fever and cough for evaluation; suspected CAP     CXR left sided opacity     CAP ( community acquired pneumonia)     will keep ceftriaxone and azithromycin     follow sputum and blood culture     RVP    # New onset Afib with RVR     etiology ? thyroid related/ infection induced ischemic     cardiology on board     started on cardizem drip with target HR < 100-110     Eliquis 5 mg BID     2d echocardiogram     TSH. T4    # HTN     bp on lower side    # Hypothyroidism     on levothyroxine 50 mcg daily    # DVT prophylaxis pt on eliquis for Afib    # DASH diet    # activity ambulate as tolerted

## 2020-01-07 NOTE — CONSULT NOTE ADULT - ASSESSMENT
Cardiologist: None    Patient is a 69y old  Female who presents with a chief complaint of dyspnea and palpitations. URTI with probably super-imposed pneumonia. No known cardiac hx except for HTN. Found to be in a-fib with RVR (hemodynamically stable). Started on cardizem gtt with improvement in her HR. CHADSVASC 3.     Impression:  New-onset a-fib with RVR   Hx HTN  Hx Hypothyroidism    Plan:  - Will discuss need for DEONDRE/DCCV as this is the patients first episode  - Agree with Eliquis  - Phill cardizem gtt for rate control  - F/U echo  - F/u TSH  - Maintain k >4  - Check mag daily and maintain >2    Will discuss with EP attending Cardiologist: None    Patient is a 69y old  Female who presents with a chief complaint of dyspnea and palpitations. URTI with probably super-imposed pneumonia. No known cardiac hx except for HTN. Found to be in a-fib with RVR (hemodynamically stable). Started on cardizem gtt with improvement in her HR. CHADSVASC 3.     Impression:  New-onset a-fib with RVR, now SR 55 BPM  Hx HTN  Hx Hypothyroidism    Plan:  - Agree with Eliquis  - Transition cardizem to PO  - F/U echo  - F/u TSH  - Maintain k >4  - Check mag daily and maintain >2  - Please f/u with Dr. Mahajan in 4-6 weeks    Will discuss with EP attending

## 2020-01-07 NOTE — H&P ADULT - NSHPPHYSICALEXAM_GEN_ALL_CORE
Vital Signs Last 24 Hrs  T(C): 38 (07 Jan 2020 10:09), Max: 38.1 (07 Jan 2020 08:07)  T(F): 100.4 (07 Jan 2020 10:09), Max: 100.6 (07 Jan 2020 08:07)  HR: 141 (07 Jan 2020 10:09) (141 - 170)  BP: 109/85 (07 Jan 2020 10:09) (109/70 - 126/73)  RR: 18 (07 Jan 2020 10:09) (18 - 20)  SpO2: 93% (07 Jan 2020 10:09) (93% - 95%)    PHYSICAL EXAM:  GENERAL: not in any acute distress  HEAD:  Atraumatic, Normocephalic  EYES: EOMI, PERRLA, conjunctiva and sclera clear  NECK: Supple, No JVD  CHEST/LUNG: bilateral minimal crackles  HEART: irregular  ABDOMEN: Soft, Nontender, Nondistended; Bowel sounds present; No guarding  EXTREMITIES:   No cyanosis or edema  PSYCH: AAOx3  NEUROLOGY: non-focal  SKIN: No rashes or lesions

## 2020-01-07 NOTE — ED PROVIDER NOTE - NS ED ROS FT
Review of Systems:  CONSTITUTIONAL: +fever, No diaphoresis, No weight change  SKIN: No rash  HEMATOLOGIC: No abnormal bleeding or bruising  EYES: No eye pain, No blurred vision  ENT: No change in hearing, No sore throat, No neck pain, No rhinorrhea, No ear pain  RESPIRATORY: No shortness of breath, +nonproductive cough  CARDIAC: No chest pain, +palpitations  GI: No abdominal pain, No nausea, No vomiting, +diarrhea, No constipation, No bright red blood per rectum or melena. No flank pain  : No dysuria, frequency, hematuria.   MUSCULOSKELETAL: No joint paint, No swelling, No back pain  NEUROLOGIC: No numbness, No focal weakness, No headache, No dizziness  All other systems negative, unless specified in HPI

## 2020-01-07 NOTE — ED PROVIDER NOTE - PHYSICAL EXAMINATION
CONSTITUTIONAL: Well-developed; well-nourished; in no acute distress.   SKIN: warm, dry  HEAD: Normocephalic; atraumatic.  EYES: no conjunctival injection. PERRLA. EOMI.   ENT: No nasal discharge; airway clear. MMM.   NECK: Supple; non tender.  CARD: S1, S2 normal; no murmurs, gallops, or rubs. Irregularly irregular   RESP: No wheezes, rales or rhonchi.  ABD: soft ntnd.   EXT: Normal ROM. Distal pulses equal and symmetric. No LE edema.   LYMPH: No acute cervical adenopathy.  NEURO: AOx4, CN 2-12 grossly intact. +5/5 strength and sensation wnl in all extremities. No pronator drift, no dysarthria, no ataxia.   PSYCH: Cooperative, appropriate.

## 2020-01-07 NOTE — H&P ADULT - HISTORY OF PRESENT ILLNESS
69 years old female pt with past medical hx of HTN and hypothyroidism came to ER because of 1 day h/o of palpitation.  As per patient for last 1 week, he is sick, has flu like symptoms cough associated with whitish to yellow sputum, fever associated with chills, she thought its flu, continued supportive therapy with anti tussives, but her condition didnt improved she remained febrile, yesterday she started having palpitation denies any chest pain , has minimal SOB with exertion.  She never had palpitations in the past. She saw her primary 3 months ago, all her labs were normal  She denies any lower leg swelling.  she denies any headaches visual problem, no weakness, no GI or urinary complaints  denies any sick contact or recent travel.  She has been mobile, compliant with her medications.  While in ER, she was found to be in Afib with RVR, started on cardizem drip, she was febrile to 38.1, started on ceftriaxone and azithromycin, s/p 2 L IV bolus

## 2020-01-07 NOTE — CONSULT NOTE ADULT - ATTENDING COMMENTS
Cardiologist: None    68 yo F with history of HTN, hypothyroidism admitted with respiratory symptoms and palpitations, found to have new onset AFib with RVR. We were consulted for AFib with RVR.  Pt was given Eliquis and started on Diltiazem drip (currently at 15 mg/hr).     AFib with RVR  Left lung opacity with respiratory symptoms  HTN  Hypothyroidism    Recommend  - Admit to tele and cont to monitor heart rhythm  - Currently in sinus rhythm. Switch IV Diltiazem to PO depending on echo results (as long as EF is normal).   - Check TSH and free T4  - Check 2D echo  - Monitor electrolytes and keep K> 4 and Mg>2  - Outpatient follow up to determine AFib burden and further management. Cardiologist: None    68 yo F with history of HTN, hypothyroidism admitted with respiratory symptoms and palpitations, found to have new onset AFib with RVR. We were consulted for AFib with RVR.  Pt was given Eliquis and started on Diltiazem drip (currently at 15 mg/hr).     AFib with RVR with CHADS VASc at least 3 (HTN, Age, F)  Left lung opacity with respiratory symptoms  HTN  Hypothyroidism    Recommend  - Admit to tele and cont to monitor heart rhythm  - Currently in sinus rhythm. Switch IV Diltiazem to PO depending on echo results (as long as EF is normal).   - Check TSH and free T4  - Check 2D echo  - Cont Eliquis  - Monitor electrolytes and keep K> 4 and Mg>2  - Outpatient follow up to determine AFib burden and further management

## 2020-01-07 NOTE — ED PROVIDER NOTE - CLINICAL SUMMARY MEDICAL DECISION MAKING FREE TEXT BOX
Rapid a-fib. Rate controlled on cardizem drip. Anticoagulated. CXR consistent with CAP. Treated with Abx. Admitted to monitored setting.

## 2020-01-07 NOTE — ED ADULT NURSE NOTE - NSIMPLEMENTINTERV_GEN_ALL_ED
Implemented All Universal Safety Interventions:  Soldier to call system. Call bell, personal items and telephone within reach. Instruct patient to call for assistance. Room bathroom lighting operational. Non-slip footwear when patient is off stretcher. Physically safe environment: no spills, clutter or unnecessary equipment. Stretcher in lowest position, wheels locked, appropriate side rails in place.

## 2020-01-07 NOTE — ED PROVIDER NOTE - ATTENDING CONTRIBUTION TO CARE
70 y/o female with hx of HTN, hyperthyroidism. c/o body aches x non-productive cough x 3 days. Developed fever to 104 two days ago. This AM was woken from sleep at 3:00 with palpitations. No light-headedness. No syncope. Presented in new onset rapid-afib. No recent travel/immobilization/surgery. No calf pain or swelling.   O/E: Constitutional: Non-toxic in appearance. Pulse 160, irregular. Eyes: No pallor, no jaundice. Neck: Neck supple, no meningeal signs. Respiratory: Lungs CTA and equal b/l. Now heezes, no rales, no rhonchi. Cardio: S1 S2 tachycardic, irregular. ABD: ABD soft, no tenderness. Extremities: No pedal edema, no calf tenderness. Skin: No skin rash. Neuro: No neurologic deficits.   EKG: Rapid a-fib.  Imp: New onset a-fib, underlying febrile illness, R/O flu.  A/P: IV fluid resuscitation, Rate control, anticoagulation, check labs, CXR, flu, electrolytes. Will re-assess. 68 y/o female with hx of HTN, hyperthyroidism. c/o body aches x non-productive cough x 3 days. Developed fever to 104 two days ago. This AM was woken from sleep at 3:00 with palpitations. No light-headedness. No syncope. Presented in new onset rapid-afib. No recent travel/immobilization/surgery. No calf pain or swelling.   O/E: Constitutional: Non-toxic in appearance. Pulse 160, irregular. Eyes: No pallor, no jaundice. Neck: Neck supple, no meningeal signs. Respiratory: Lungs CTA and equal b/l. No wheezes, no rales, no rhonchi. Cardio: S1 S2 tachycardic, irregular. ABD: ABD soft, no tenderness. Extremities: No pedal edema, no calf tenderness. Skin: No skin rash. Neuro: No neurologic deficits.   EKG: Rapid a-fib.  Imp: New onset a-fib, underlying febrile illness, R/O flu.  A/P: IV fluid resuscitation, Rate control, anticoagulation, check labs, CXR, flu, electrolytes. Will re-assess.

## 2020-01-08 LAB
ANION GAP SERPL CALC-SCNC: 16 MMOL/L — HIGH (ref 7–14)
B PERT DNA SPEC QL NAA+PROBE: DETECTED
BUN SERPL-MCNC: 10 MG/DL — SIGNIFICANT CHANGE UP (ref 10–20)
CALCIUM SERPL-MCNC: 8.5 MG/DL — SIGNIFICANT CHANGE UP (ref 8.5–10.1)
CHLORIDE SERPL-SCNC: 103 MMOL/L — SIGNIFICANT CHANGE UP (ref 98–110)
CHOLEST SERPL-MCNC: 187 MG/DL — SIGNIFICANT CHANGE UP (ref 100–200)
CO2 SERPL-SCNC: 22 MMOL/L — SIGNIFICANT CHANGE UP (ref 17–32)
CREAT SERPL-MCNC: 0.6 MG/DL — LOW (ref 0.7–1.5)
CULTURE RESULTS: SIGNIFICANT CHANGE UP
ESTIMATED AVERAGE GLUCOSE: 117 MG/DL — HIGH (ref 68–114)
GLUCOSE SERPL-MCNC: 120 MG/DL — HIGH (ref 70–99)
HBA1C BLD-MCNC: 5.7 % — HIGH (ref 4–5.6)
HBA1C MFR BLD HPLC: 5.7 %
HCT VFR BLD CALC: 36.5 % — LOW (ref 37–47)
HCV AB S/CO SERPL IA: 0.09 S/CO — SIGNIFICANT CHANGE UP (ref 0–0.99)
HCV AB SER-ACNC: NEGATIVE
HCV AB SERPL-IMP: SIGNIFICANT CHANGE UP
HDLC SERPL-MCNC: 39 MG/DL — LOW
HGB BLD-MCNC: 12.5 G/DL — SIGNIFICANT CHANGE UP (ref 12–16)
LIPID PNL WITH DIRECT LDL SERPL: 124 MG/DL — SIGNIFICANT CHANGE UP (ref 4–129)
MAGNESIUM SERPL-MCNC: 2 MG/DL — SIGNIFICANT CHANGE UP (ref 1.8–2.4)
MCHC RBC-ENTMCNC: 30.6 PG — SIGNIFICANT CHANGE UP (ref 27–31)
MCHC RBC-ENTMCNC: 34.2 G/DL — SIGNIFICANT CHANGE UP (ref 32–37)
MCV RBC AUTO: 89.5 FL — SIGNIFICANT CHANGE UP (ref 81–99)
NRBC # BLD: 0 /100 WBCS — SIGNIFICANT CHANGE UP (ref 0–0)
PHOSPHATE SERPL-MCNC: 2.3 MG/DL — SIGNIFICANT CHANGE UP (ref 2.1–4.9)
PLATELET # BLD AUTO: 207 K/UL — SIGNIFICANT CHANGE UP (ref 130–400)
POTASSIUM SERPL-MCNC: 3.5 MMOL/L — SIGNIFICANT CHANGE UP (ref 3.5–5)
POTASSIUM SERPL-SCNC: 3.5 MMOL/L — SIGNIFICANT CHANGE UP (ref 3.5–5)
RAPID RVP RESULT: DETECTED
RBC # BLD: 4.08 M/UL — LOW (ref 4.2–5.4)
RBC # FLD: 12.7 % — SIGNIFICANT CHANGE UP (ref 11.5–14.5)
SODIUM SERPL-SCNC: 141 MMOL/L — SIGNIFICANT CHANGE UP (ref 135–146)
SPECIMEN SOURCE: SIGNIFICANT CHANGE UP
T4 AB SER-ACNC: 6.5 UG/DL — SIGNIFICANT CHANGE UP (ref 4.6–12)
TOTAL CHOLESTEROL/HDL RATIO MEASUREMENT: 4.8 RATIO — SIGNIFICANT CHANGE UP (ref 4–5.5)
TRIGL SERPL-MCNC: 146 MG/DL — SIGNIFICANT CHANGE UP (ref 10–149)
TSH SERPL-MCNC: 2.45 UIU/ML — SIGNIFICANT CHANGE UP (ref 0.27–4.2)
WBC # BLD: 9.4 K/UL — SIGNIFICANT CHANGE UP (ref 4.8–10.8)
WBC # FLD AUTO: 9.4 K/UL — SIGNIFICANT CHANGE UP (ref 4.8–10.8)

## 2020-01-08 PROCEDURE — 71045 X-RAY EXAM CHEST 1 VIEW: CPT | Mod: 26

## 2020-01-08 PROCEDURE — 99233 SBSQ HOSP IP/OBS HIGH 50: CPT

## 2020-01-08 RX ORDER — GUAIFENESIN/DEXTROMETHORPHAN 600MG-30MG
5 TABLET, EXTENDED RELEASE 12 HR ORAL THREE TIMES A DAY
Refills: 0 | Status: DISCONTINUED | OUTPATIENT
Start: 2020-01-08 | End: 2020-01-10

## 2020-01-08 RX ORDER — IBUPROFEN 200 MG
200 TABLET ORAL EVERY 6 HOURS
Refills: 0 | Status: DISCONTINUED | OUTPATIENT
Start: 2020-01-08 | End: 2020-01-08

## 2020-01-08 RX ORDER — IBUPROFEN 200 MG
200 TABLET ORAL
Refills: 0 | Status: DISCONTINUED | OUTPATIENT
Start: 2020-01-08 | End: 2020-01-10

## 2020-01-08 RX ORDER — ACETAMINOPHEN 500 MG
650 TABLET ORAL ONCE
Refills: 0 | Status: COMPLETED | OUTPATIENT
Start: 2020-01-08 | End: 2020-01-08

## 2020-01-08 RX ORDER — ACETAMINOPHEN 500 MG
650 TABLET ORAL EVERY 6 HOURS
Refills: 0 | Status: DISCONTINUED | OUTPATIENT
Start: 2020-01-08 | End: 2020-01-10

## 2020-01-08 RX ORDER — SODIUM CHLORIDE 9 MG/ML
1000 INJECTION INTRAMUSCULAR; INTRAVENOUS; SUBCUTANEOUS
Refills: 0 | Status: DISCONTINUED | OUTPATIENT
Start: 2020-01-08 | End: 2020-01-09

## 2020-01-08 RX ADMIN — Medication 5 MILLILITER(S): at 15:48

## 2020-01-08 RX ADMIN — APIXABAN 5 MILLIGRAM(S): 2.5 TABLET, FILM COATED ORAL at 05:23

## 2020-01-08 RX ADMIN — APIXABAN 5 MILLIGRAM(S): 2.5 TABLET, FILM COATED ORAL at 18:10

## 2020-01-08 RX ADMIN — Medication 650 MILLIGRAM(S): at 05:54

## 2020-01-08 RX ADMIN — Medication 200 MILLIGRAM(S): at 17:19

## 2020-01-08 RX ADMIN — Medication 650 MILLIGRAM(S): at 10:55

## 2020-01-08 RX ADMIN — SODIUM CHLORIDE 75 MILLILITER(S): 9 INJECTION INTRAMUSCULAR; INTRAVENOUS; SUBCUTANEOUS at 10:17

## 2020-01-08 RX ADMIN — Medication 50 MICROGRAM(S): at 05:23

## 2020-01-08 NOTE — PROGRESS NOTE ADULT - SUBJECTIVE AND OBJECTIVE BOX
PROGRESS NOTE  Chief Complaint:  Patient is a 69y old  Female who presents with a chief complaint of palpitation and SOB for 1 day preceded by 1 week h/o of cough and fever (2020 14:06)      HPI:  69 years old female pt with past medical hx of HTN and hypothyroidism came to ER because of 1 day h/o of palpitation.  As per patient for last 1 week, he is sick, has flu like symptoms cough associated with whitish to yellow sputum, fever associated with chills, she thought its flu, continued supportive therapy with anti tussives, but her condition didnt improved she remained febrile, yesterday she started having palpitation denies any chest pain , has minimal SOB with exertion.  She never had palpitations in the past. She saw her primary 3 months ago, all her labs were normal  She denies any lower leg swelling.  she denies any headaches visual problem, no weakness, no GI or urinary complaints  denies any sick contact or recent travel.  She has been mobile, compliant with her medications.  While in ER, she was found to be in Afib with RVR, started on cardizem drip, she was febrile to 38.1, started on ceftriaxone and azithromycin, s/p 2 L IV bolus (2020 11:28)      ALLERGIES:  No Known Allergies      HOSPITAL MEDICATIONS:  MEDICATIONS  (STANDING):  apixaban 5 milliGRAM(s) Oral every 12 hours  levoFLOXacin IVPB      levothyroxine 50 MICROGram(s) Oral daily  sodium chloride 0.9%. 1000 milliLiter(s) (75 mL/Hr) IV Continuous <Continuous>    MEDICATIONS  (PRN):  acetaminophen   Tablet .. 650 milliGRAM(s) Oral every 6 hours PRN Temp greater or equal to 38C (100.4F)  guaifenesin/dextromethorphan  Syrup 5 milliLiter(s) Oral three times a day PRN coughing  ibuprofen  Tablet. 200 milliGRAM(s) Oral two times a day PRN Temp greater or equal to 38C (100.4F)      PMHX/PSHX:  Hypothyroid  Hypertension  S/P cholecystectomy      FAMILY HISTORY:  FH: heart disease      SOCIAL HISTORY:    REVIEW OF SYSTEMS:     General:  No wt loss, fevers, chills, night sweats, fatigue,   Eyes:  Good vision, no reported pain  ENT:  No sore throat, pain, runny nose, dysphagia  CV:  No pain, palpitations, hypo/hypertension  Resp:  No dyspnea, cough, tachypnea, wheezing  GI:  No pain, No nausea, No vomiting, No diarrhea, No constipation, No weight loss, No fever, No pruritis, No rectal bleeding, No tarry stools, No dysphagia,  :  No pain, bleeding, incontinence, nocturia  Muscle:  No pain, weakness  Neuro:  No weakness, tingling, memory problems  Psych:  No fatigue, insomnia, mood problems, depression  Endocrine:  No polyuria, polydipsia, cold/heat intolerance  Heme:  No petechiae, ecchymosis, easy bruisability  Skin:  No rash, tattoos, scars, edema      PHYSICAL EXAM:   Vital Signs:  Vital Signs Last 24 Hrs  T(C): 37.4 (2020 12:55), Max: 39.7 (2020 18:34)  T(F): 99.3 (2020 12:55), Max: 103.4 (2020 18:34)  HR: 62 (2020 12:55) (55 - 68)  BP: 118/54 (2020 12:55) (107/53 - 126/62)  BP(mean): 71 (2020 22:00) (71 - 71)  RR: 18 (2020 06:01) (18 - 20)  SpO2: 96% (2020 22:00) (95% - 98%)  Daily     Daily     GENERAL:  Appears stated age, well-groomed, well-nourished, no distress  HEENT:  NC/AT,  conjunctivae clear and pink, no thyromegaly, nodules, adenopathy, no JVD, sclera -anicteric  CHEST:  Full & symmetric excursion, no increased effort, breath sounds clear  HEART:  Regular rhythm, S1, S2, no murmur/rub/S3/S4, no abdominal bruit, no edema  ABDOMEN:  Soft, non-tender, non-distended, normoactive bowel sounds,  no masses ,no hepato-splenomegaly, no signs of chronic liver disease  EXTEREMITIES:  no cyanosis,clubbing or edema  SKIN:  No rash/erythema/ecchymoses/petechiae/wounds/abscess/warm/dry  NEURO:  Alert, oriented, no asterixis, no tremor, no encephalopathy    LABS:                        12.5   9.40  )-----------( 207      ( 2020 08:40 )             36.5     01-08    141  |  103  |  10  ----------------------------<  120<H>  3.5   |  22  |  0.6<L>    Ca    8.5      2020 08:40  Phos  2.3     01-08  Mg     2.0     01-08    TPro  6.7  /  Alb  4.3  /  TBili  0.5  /  DBili  x   /  AST  36  /  ALT  17  /  AlkPhos  45  01-07    LIVER FUNCTIONS - ( 2020 08:32 )  Alb: 4.3 g/dL / Pro: 6.7 g/dL / ALK PHOS: 45 U/L / ALT: 17 U/L / AST: 36 U/L / GGT: x           PT/INR - ( 2020 08:32 )   PT: 12.90 sec;   INR: 1.12 ratio         PTT - ( 2020 08:32 )  PTT:34.2 sec  Urinalysis Basic - ( 2020 10:58 )    Color: Yellow / Appearance: Clear / S.012 / pH: x  Gluc: x / Ketone: Negative  / Bili: Negative / Urobili: <2 mg/dL   Blood: x / Protein: 30 mg/dL / Nitrite: Negative   Leuk Esterase: Moderate / RBC: 2 /HPF / WBC 5 /HPF   Sq Epi: x / Non Sq Epi: 4 /HPF / Bacteria: Few          IMAGING:      ASSESSMENT & PLAN:

## 2020-01-08 NOTE — PROGRESS NOTE ADULT - ASSESSMENT
69 years old female pt with past medical hx of HTN and hypothyroidism came to ER because of 1 day h/o of palpitation, and 1 week H/o of cough and fever.    # Sepsis secondary to CAP     CXR left sided opacity     continuing to have fever, was on ceftriaxone/zithromax changed to levaquin today     follow sputum and blood culture     RVP shows mycoplasma pneumoniae    # New onset Afib with RVR     etiology ? thyroid related/ infection induced ischemic     cardiology on board     started on cardizem drip then went into sinus     Eliquis 5 mg BID     echo shows EF 60, no dysfunction     f/u TSH, T4    # HTN     BP x 24 hours: BP:  (107/53 - 126/62)    # Hypothyroidism     on levothyroxine 50 mcg daily    PLAN: f/u fever curve on levaquin for mycoplasma pneumoniae    # DVT prophylaxis pt on eliquis for Afib    # DASH diet    # activity ambulate as tolerted

## 2020-01-08 NOTE — PROGRESS NOTE ADULT - SUBJECTIVE AND OBJECTIVE BOX
SUBJECTIVE:    Patient is a 69y old Female who presents with a chief complaint of palpitation and SOB for 1 day preceded by 1 week h/o of cough and fever (2020 14:24)      HPI:  69 years old female pt with past medical hx of HTN and hypothyroidism came to ER because of 1 day h/o of palpitation.  As per patient for last 1 week, he is sick, has flu like symptoms cough associated with whitish to yellow sputum, fever associated with chills, she thought its flu, continued supportive therapy with anti tussives, but her condition didnt improved she remained febrile, yesterday she started having palpitation denies any chest pain , has minimal SOB with exertion.  She never had palpitations in the past. She saw her primary 3 months ago, all her labs were normal  She denies any lower leg swelling.  she denies any headaches visual problem, no weakness, no GI or urinary complaints  denies any sick contact or recent travel.  She has been mobile, compliant with her medications.  While in ER, she was found to be in Afib with RVR, started on cardizem drip, she was febrile to 38.1, started on ceftriaxone and azithromycin, s/p 2 L IV bolus (2020 11:28)      Currently admitted to medicine with the primary diagnosis of Atrial fibrillation with rapid ventricular response     comfortable on 4L, continuing to cough    Besides the pertinent positives and negatives described above, the ROS was within normal limits.    PAST MEDICAL & SURGICAL HISTORY  Hypothyroid  Hypertension  S/P cholecystectomy    SOCIAL HISTORY:    ALLERGIES:  No Known Allergies    MEDICATIONS:  STANDING MEDICATIONS  apixaban 5 milliGRAM(s) Oral every 12 hours  levoFLOXacin IVPB      levothyroxine 50 MICROGram(s) Oral daily  sodium chloride 0.9%. 1000 milliLiter(s) IV Continuous <Continuous>    PRN MEDICATIONS  acetaminophen   Tablet .. 650 milliGRAM(s) Oral every 6 hours PRN  guaifenesin/dextromethorphan  Syrup 5 milliLiter(s) Oral three times a day PRN  ibuprofen  Tablet. 200 milliGRAM(s) Oral two times a day PRN    VITALS:   T(F): 99.3  HR: 62  BP: 118/54  RR: 18  SpO2: 96%    LABS:                        12.5   9.40  )-----------( 207      ( 2020 08:40 )             36.5     01-08    141  |  103  |  10  ----------------------------<  120<H>  3.5   |  22  |  0.6<L>    Ca    8.5      2020 08:40  Phos  2.3     01-08  Mg     2.0     -08    TPro  6.7  /  Alb  4.3  /  TBili  0.5  /  DBili  x   /  AST  36  /  ALT  17  /  AlkPhos  45  01-07    PT/INR - ( 2020 08:32 )   PT: 12.90 sec;   INR: 1.12 ratio         PTT - ( 2020 08:32 )  PTT:34.2 sec  Urinalysis Basic - ( 2020 10:58 )    Color: Yellow / Appearance: Clear / S.012 / pH: x  Gluc: x / Ketone: Negative  / Bili: Negative / Urobili: <2 mg/dL   Blood: x / Protein: 30 mg/dL / Nitrite: Negative   Leuk Esterase: Moderate / RBC: 2 /HPF / WBC 5 /HPF   Sq Epi: x / Non Sq Epi: 4 /HPF / Bacteria: Few            Culture - Urine (collected 2020 10:58)  Source: .Urine Clean Catch (Midstream)  Final Report (2020 13:18):    <10,000 CFU/mL Normal Urogenital Khadra      CARDIAC MARKERS ( 2020 08:59 )  x     / <0.01 ng/mL / x     / x     / x          RADIOLOGY:    PHYSICAL EXAM:  GEN: No acute distress  LUNGS: mild rhonchi bilaterally, on 4L  HEART: Regular  ABD: Soft, non-tender, non-distended.  EXT: NC/NC/NE/2+PP/THORNTON/Skin Intact.   NEURO: AAOX3    Intravenous access: yes  NG tube: no  Lockhart Catheter: no

## 2020-01-08 NOTE — PROGRESS NOTE ADULT - ASSESSMENT
1. Sepsis - present on admission with left sided community acquired pneumonia. Was tachy and fever 102, documented, with white count high.       S/P Ceftriaxone and Azithromycin       Already switched to Levaquin.       Follow blood culture.       Legionella and strep pneumoniae also     2. New Onset a fibb- S/P Cardizem and converted to normal now.      Eliquis started     3. HTN / HLP - Stable       D/W family. the plan   clinically improved.   Monitor at least 24 more hours.

## 2020-01-09 LAB
ALBUMIN SERPL ELPH-MCNC: 3.6 G/DL — SIGNIFICANT CHANGE UP (ref 3.5–5.2)
ALP SERPL-CCNC: 43 U/L — SIGNIFICANT CHANGE UP (ref 30–115)
ALT FLD-CCNC: 33 U/L — SIGNIFICANT CHANGE UP (ref 0–41)
ANION GAP SERPL CALC-SCNC: 16 MMOL/L — HIGH (ref 7–14)
AST SERPL-CCNC: 45 U/L — HIGH (ref 0–41)
BASOPHILS # BLD AUTO: 0.02 K/UL — SIGNIFICANT CHANGE UP (ref 0–0.2)
BASOPHILS NFR BLD AUTO: 0.2 % — SIGNIFICANT CHANGE UP (ref 0–1)
BILIRUB SERPL-MCNC: 0.4 MG/DL — SIGNIFICANT CHANGE UP (ref 0.2–1.2)
BUN SERPL-MCNC: 7 MG/DL — LOW (ref 10–20)
CALCIUM SERPL-MCNC: 8.2 MG/DL — LOW (ref 8.5–10.1)
CHLORIDE SERPL-SCNC: 104 MMOL/L — SIGNIFICANT CHANGE UP (ref 98–110)
CO2 SERPL-SCNC: 22 MMOL/L — SIGNIFICANT CHANGE UP (ref 17–32)
CREAT SERPL-MCNC: 0.5 MG/DL — LOW (ref 0.7–1.5)
EOSINOPHIL # BLD AUTO: 0 K/UL — SIGNIFICANT CHANGE UP (ref 0–0.7)
EOSINOPHIL NFR BLD AUTO: 0 % — SIGNIFICANT CHANGE UP (ref 0–8)
GLUCOSE SERPL-MCNC: 114 MG/DL — HIGH (ref 70–99)
HCT VFR BLD CALC: 36 % — LOW (ref 37–47)
HGB BLD-MCNC: 12.3 G/DL — SIGNIFICANT CHANGE UP (ref 12–16)
IMM GRANULOCYTES NFR BLD AUTO: 1.2 % — HIGH (ref 0.1–0.3)
LYMPHOCYTES # BLD AUTO: 0.88 K/UL — LOW (ref 1.2–3.4)
LYMPHOCYTES # BLD AUTO: 10.7 % — LOW (ref 20.5–51.1)
MAGNESIUM SERPL-MCNC: 2 MG/DL — SIGNIFICANT CHANGE UP (ref 1.8–2.4)
MCHC RBC-ENTMCNC: 30.2 PG — SIGNIFICANT CHANGE UP (ref 27–31)
MCHC RBC-ENTMCNC: 34.2 G/DL — SIGNIFICANT CHANGE UP (ref 32–37)
MCV RBC AUTO: 88.5 FL — SIGNIFICANT CHANGE UP (ref 81–99)
MONOCYTES # BLD AUTO: 0.63 K/UL — HIGH (ref 0.1–0.6)
MONOCYTES NFR BLD AUTO: 7.6 % — SIGNIFICANT CHANGE UP (ref 1.7–9.3)
NEUTROPHILS # BLD AUTO: 6.61 K/UL — HIGH (ref 1.4–6.5)
NEUTROPHILS NFR BLD AUTO: 80.3 % — HIGH (ref 42.2–75.2)
NRBC # BLD: 0 /100 WBCS — SIGNIFICANT CHANGE UP (ref 0–0)
PLATELET # BLD AUTO: 233 K/UL — SIGNIFICANT CHANGE UP (ref 130–400)
POTASSIUM SERPL-MCNC: 3.2 MMOL/L — LOW (ref 3.5–5)
POTASSIUM SERPL-SCNC: 3.2 MMOL/L — LOW (ref 3.5–5)
PROT SERPL-MCNC: 5.7 G/DL — LOW (ref 6–8)
RBC # BLD: 4.07 M/UL — LOW (ref 4.2–5.4)
RBC # FLD: 12.6 % — SIGNIFICANT CHANGE UP (ref 11.5–14.5)
SODIUM SERPL-SCNC: 142 MMOL/L — SIGNIFICANT CHANGE UP (ref 135–146)
WBC # BLD: 8.24 K/UL — SIGNIFICANT CHANGE UP (ref 4.8–10.8)
WBC # FLD AUTO: 8.24 K/UL — SIGNIFICANT CHANGE UP (ref 4.8–10.8)

## 2020-01-09 PROCEDURE — 99233 SBSQ HOSP IP/OBS HIGH 50: CPT

## 2020-01-09 RX ORDER — AZITHROMYCIN 500 MG/1
TABLET, FILM COATED ORAL
Refills: 0 | Status: DISCONTINUED | OUTPATIENT
Start: 2020-01-09 | End: 2020-01-09

## 2020-01-09 RX ORDER — POTASSIUM CHLORIDE 20 MEQ
40 PACKET (EA) ORAL ONCE
Refills: 0 | Status: COMPLETED | OUTPATIENT
Start: 2020-01-09 | End: 2020-01-09

## 2020-01-09 RX ORDER — AZITHROMYCIN 500 MG/1
250 TABLET, FILM COATED ORAL DAILY
Refills: 0 | Status: DISCONTINUED | OUTPATIENT
Start: 2020-01-10 | End: 2020-01-10

## 2020-01-09 RX ORDER — AZITHROMYCIN 500 MG/1
500 TABLET, FILM COATED ORAL ONCE
Refills: 0 | Status: COMPLETED | OUTPATIENT
Start: 2020-01-09 | End: 2020-01-09

## 2020-01-09 RX ADMIN — APIXABAN 5 MILLIGRAM(S): 2.5 TABLET, FILM COATED ORAL at 17:58

## 2020-01-09 RX ADMIN — AZITHROMYCIN 255 MILLIGRAM(S): 500 TABLET, FILM COATED ORAL at 09:21

## 2020-01-09 RX ADMIN — Medication 50 MICROGRAM(S): at 05:24

## 2020-01-09 RX ADMIN — Medication 650 MILLIGRAM(S): at 12:26

## 2020-01-09 RX ADMIN — APIXABAN 5 MILLIGRAM(S): 2.5 TABLET, FILM COATED ORAL at 05:25

## 2020-01-09 RX ADMIN — Medication 40 MILLIEQUIVALENT(S): at 11:56

## 2020-01-09 RX ADMIN — Medication 650 MILLIGRAM(S): at 21:09

## 2020-01-09 RX ADMIN — Medication 650 MILLIGRAM(S): at 22:18

## 2020-01-09 RX ADMIN — Medication 650 MILLIGRAM(S): at 04:23

## 2020-01-09 RX ADMIN — Medication 650 MILLIGRAM(S): at 03:12

## 2020-01-09 NOTE — PROGRESS NOTE ADULT - ASSESSMENT
ASSESSMENT  69 years old female pt with past medical hx of HTN and hypothyroidism came to ER because of 1 day h/o of palpitations, fever, cough     IMPRESSION  #Mycoplasma PNA    < from: Xray Chest 1 View-PORTABLE IMMEDIATE (01.08.20 @ 12:01) >Patchy bilateral opacifications.  #Lactic acidosis  Lactate: 2.0 mmoL/L (01-07-20 @ 08:25)    RECOMMENDATIONS  - Azithro 500mg x1 IV then 250mg daily to complete 5-7 days pending clinical response (QTC Calculation(Bezet) 409 ms)    Spectra [phone]

## 2020-01-09 NOTE — CONSULT NOTE ADULT - ASSESSMENT
ASSESSMENT  69 years old female pt with past medical hx of HTN and hypothyroidism came to ER because of 1 day h/o of palpitations, fever, cough     IMPRESSION  #Mycoplasma PNA    < from: Xray Chest 1 View-PORTABLE IMMEDIATE (01.08.20 @ 12:01) >Patchy bilateral opacifications.  #Lactic acidosis  Lactate: 2.0 mmoL/L (01-07-20 @ 08:25)    RECOMMENDATIONS  - Azithro 500mg x1 IV then 250mg daily to complete 5-7 days pending clinical response (QTC Calculation(Bezet) 409 ms)  - Droplet     Spectra [phone]

## 2020-01-09 NOTE — PROGRESS NOTE ADULT - ASSESSMENT
69 years old female pt with past medical hx of HTN and hypothyroidism came to ER because of 1 day h/o of palpitation, and 1 week H/o of cough and fever.    # Sepsis secondary to CAP     CXR left sided opacity     currently afebrile, was on ceftriaxone/zithromax changed to levaquin 1/8/20     RVP shows mycoplasma pneumoniae    # New onset Afib with RVR     etiology ? thyroid related/ infection induced ischemic     cardiology on board     started on cardizem drip then went into sinus     Eliquis 5 mg BID     echo shows EF 60, no dysfunction     f/u TSH, T4    # HTN     BP x 24 hours: BP:  (107/53 - 126/62)    # Hypothyroidism     on levothyroxine 50 mcg daily    PLAN: f/u fever curve on levaquin for mycoplasma pneumoniae    # DVT prophylaxis pt on eliquis for Afib    # DASH diet    # activity ambulate as tolerted 69 years old female pt with past medical hx of HTN and hypothyroidism came to ER because of 1 day h/o of palpitation, and 1 week H/o of cough and fever.    # Sepsis secondary to CAP     CXR left sided opacity     currently afebrile, was on ceftriaxone/zithromax changed to levaquin 1/8/20     RVP shows mycoplasma pneumoniae    # New onset Afib with RVR     etiology ? thyroid related/ infection induced ischemic     cardiology on board     started on cardizem drip then went into sinus     Eliquis 5 mg BID     echo shows EF 60, no dysfunction     TSH 2,45, T4 6.5    # HTN     BP x 24 hours: BP:  (113/64 - 118/54)    # Hypothyroidism     on levothyroxine 50 mcg daily    PLAN: continue with levaquin for mycoplasma pna    # DVT prophylaxis pt on eliquis for Afib    # DASH diet    # activity ambulate as tolerted 69 years old female pt with past medical hx of HTN and hypothyroidism came to ER because of 1 day h/o of palpitation, and 1 week H/o of cough and fever.    # Sepsis secondary to CAP. Community acquired pneumonia. Mycoplasma pneumonia      Bilateral opacity now      currently afebrile, was on ceftriaxone/zithromax changed to levaquin 1/8/20     RVP shows mycoplasma pneumoniae  On zithromax now     # New onset Afib with RVR     etiology ? thyroid related/ infection induced ischemic     cardiology on board     started on cardizem drip then went into sinus     Eliquis 5 mg BID     echo shows EF 60, no dysfunction     TSH 2,45, T4 6.5    # HTN     BP x 24 hours: BP:  (113/64 - 118/54)    # Hypothyroidism     on levothyroxine 50 mcg daily    # DVT prophylaxis pt on eliquis for Afib    # DASH diet    # activity ambulate as tolerated

## 2020-01-09 NOTE — PROGRESS NOTE ADULT - SUBJECTIVE AND OBJECTIVE BOX
SUBJECTIVE:    Patient is a 69y old Female who presents with a chief complaint of palpitation and SOB for 1 day preceded by 1 week h/o of cough and fever (2020 17:14)      HPI:  69 years old female pt with past medical hx of HTN and hypothyroidism came to ER because of 1 day h/o of palpitation.  As per patient for last 1 week, he is sick, has flu like symptoms cough associated with whitish to yellow sputum, fever associated with chills, she thought its flu, continued supportive therapy with anti tussives, but her condition didnt improved she remained febrile, yesterday she started having palpitation denies any chest pain , has minimal SOB with exertion.  She never had palpitations in the past. She saw her primary 3 months ago, all her labs were normal  She denies any lower leg swelling.  she denies any headaches visual problem, no weakness, no GI or urinary complaints  denies any sick contact or recent travel.  She has been mobile, compliant with her medications.  While in ER, she was found to be in Afib with RVR, started on cardizem drip, she was febrile to 38.1, started on ceftriaxone and azithromycin, s/p 2 L IV bolus (2020 11:28)      Currently admitted to medicine with the primary diagnosis of Atrial fibrillation with rapid ventricular response     continuing to cough, feeling pain on on both sides of lower chest when coughing    Besides the pertinent positives and negatives described above, the ROS was within normal limits.    PAST MEDICAL & SURGICAL HISTORY  Hypothyroid  Hypertension  S/P cholecystectomy    SOCIAL HISTORY:    ALLERGIES:  No Known Allergies    MEDICATIONS:  STANDING MEDICATIONS  apixaban 5 milliGRAM(s) Oral every 12 hours  levoFLOXacin IVPB      levoFLOXacin IVPB 750 milliGRAM(s) IV Intermittent every 24 hours  levothyroxine 50 MICROGram(s) Oral daily  sodium chloride 0.9%. 1000 milliLiter(s) IV Continuous <Continuous>    PRN MEDICATIONS  acetaminophen   Tablet .. 650 milliGRAM(s) Oral every 6 hours PRN  guaifenesin/dextromethorphan  Syrup 5 milliLiter(s) Oral three times a day PRN  ibuprofen  Tablet. 200 milliGRAM(s) Oral two times a day PRN    VITALS:   T(F): 97.9  HR: 63  BP: 115/60  RR: 18  SpO2: 95%    LABS:                        12.5   9.40  )-----------( 207      ( 2020 08:40 )             36.5     01-08    141  |  103  |  10  ----------------------------<  120<H>  3.5   |  22  |  0.6<L>    Ca    8.5      2020 08:40  Phos  2.3     01-08  Mg     2.0     01-08    TPro  6.7  /  Alb  4.3  /  TBili  0.5  /  DBili  x   /  AST  36  /  ALT  17  /  AlkPhos  45  01-07    PT/INR - ( 2020 08:32 )   PT: 12.90 sec;   INR: 1.12 ratio         PTT - ( 2020 08:32 )  PTT:34.2 sec  Urinalysis Basic - ( 2020 10:58 )    Color: Yellow / Appearance: Clear / S.012 / pH: x  Gluc: x / Ketone: Negative  / Bili: Negative / Urobili: <2 mg/dL   Blood: x / Protein: 30 mg/dL / Nitrite: Negative   Leuk Esterase: Moderate / RBC: 2 /HPF / WBC 5 /HPF   Sq Epi: x / Non Sq Epi: 4 /HPF / Bacteria: Few            Culture - Urine (collected 2020 10:58)  Source: .Urine Clean Catch (Midstream)  Final Report (2020 13:18):    <10,000 CFU/mL Normal Urogenital Khadra    Culture - Blood (collected 2020 08:56)  Source: .Blood Blood-Peripheral  Preliminary Report (2020 18:02):    No growth to date.    Culture - Blood (collected 2020 08:56)  Source: .Blood Blood-Peripheral  Preliminary Report (2020 18:02):    No growth to date.      CARDIAC MARKERS ( 2020 08:59 )  x     / <0.01 ng/mL / x     / x     / x          RADIOLOGY:    PHYSICAL EXAM:  GEN: No acute distress  LUNGS: Clear to auscultation bilaterally   HEART: Regular  ABD: Soft, non-tender, non-distended.  EXT: NC/NC/NE/2+PP/THORNTON/Skin Intact.   NEURO: AAOX3    Intravenous access: yes  NG tube: no  Lockhart Catheter: no SUBJECTIVE:    Patient is a 69y old Female who presents with a chief complaint of palpitation and SOB for 1 day preceded by 1 week h/o of cough and fever (2020 17:14)    HPI:  69 years old female pt with past medical hx of HTN and hypothyroidism came to ER because of 1 day h/o of palpitation.  As per patient for last 1 week, he is sick, has flu like symptoms cough associated with whitish to yellow sputum, fever associated with chills, she thought its flu, continued supportive therapy with anti tussives, but her condition didnt improved she remained febrile, yesterday she started having palpitation denies any chest pain , has minimal SOB with exertion.  She never had palpitations in the past. She saw her primary 3 months ago, all her labs were normal  She denies any lower leg swelling.  she denies any headaches visual problem, no weakness, no GI or urinary complaints  denies any sick contact or recent travel.  She has been mobile, compliant with her medications.  While in ER, she was found to be in Afib with RVR, started on cardizem drip, she was febrile to 38.1, started on ceftriaxone and azithromycin, s/p 2 L IV bolus (2020 11:28)      Currently admitted to medicine with the primary diagnosis of Atrial fibrillation with rapid ventricular response     continuing to cough, feeling pain on on both sides of lower chest when coughing    Besides the pertinent positives and negatives described above, the ROS was within normal limits.    PAST MEDICAL & SURGICAL HISTORY  Hypothyroid  Hypertension  S/P cholecystectomy    SOCIAL HISTORY:    ALLERGIES:  No Known Allergies    MEDICATIONS:  STANDING MEDICATIONS  apixaban 5 milliGRAM(s) Oral every 12 hours  levoFLOXacin IVPB      levoFLOXacin IVPB 750 milliGRAM(s) IV Intermittent every 24 hours  levothyroxine 50 MICROGram(s) Oral daily  sodium chloride 0.9%. 1000 milliLiter(s) IV Continuous <Continuous>    PRN MEDICATIONS  acetaminophen   Tablet .. 650 milliGRAM(s) Oral every 6 hours PRN  guaifenesin/dextromethorphan  Syrup 5 milliLiter(s) Oral three times a day PRN  ibuprofen  Tablet. 200 milliGRAM(s) Oral two times a day PRN    VITALS:   T(F): 97.9  HR: 63  BP: 115/60  RR: 18  SpO2: 95%    LABS:                        12.5   9.40  )-----------( 207      ( 2020 08:40 )             36.5     01-08    141  |  103  |  10  ----------------------------<  120<H>  3.5   |  22  |  0.6<L>    Ca    8.5      2020 08:40  Phos  2.3     01-08  Mg     2.0     01-08    TPro  6.7  /  Alb  4.3  /  TBili  0.5  /  DBili  x   /  AST  36  /  ALT  17  /  AlkPhos  45  01-07    PT/INR - ( 2020 08:32 )   PT: 12.90 sec;   INR: 1.12 ratio         PTT - ( 2020 08:32 )  PTT:34.2 sec  Urinalysis Basic - ( 2020 10:58 )    Color: Yellow / Appearance: Clear / S.012 / pH: x  Gluc: x / Ketone: Negative  / Bili: Negative / Urobili: <2 mg/dL   Blood: x / Protein: 30 mg/dL / Nitrite: Negative   Leuk Esterase: Moderate / RBC: 2 /HPF / WBC 5 /HPF   Sq Epi: x / Non Sq Epi: 4 /HPF / Bacteria: Few            Culture - Urine (collected 2020 10:58)  Source: .Urine Clean Catch (Midstream)  Final Report (2020 13:18):    <10,000 CFU/mL Normal Urogenital Khadra    Culture - Blood (collected 2020 08:56)  Source: .Blood Blood-Peripheral  Preliminary Report (2020 18:02):    No growth to date.    Culture - Blood (collected 2020 08:56)  Source: .Blood Blood-Peripheral  Preliminary Report (2020 18:02):    No growth to date.      CARDIAC MARKERS ( 2020 08:59 )  x     / <0.01 ng/mL / x     / x     / x          RADIOLOGY:    PHYSICAL EXAM:  GEN: No acute distress  LUNGS: Clear to auscultation bilaterally   HEART: Regular  ABD: Soft, non-tender, non-distended.  EXT: NC/NC/NE/2+PP/THORNTON/Skin Intact.   NEURO: AAOX3    Intravenous access: yes  NG tube: no  Lockhart Catheter: no

## 2020-01-09 NOTE — CONSULT NOTE ADULT - REASON FOR ADMISSION
palpitation and SOB for 1 day preceded by 1 week h/o of cough and fever
Palpitations and URI
palpitation and SOB for 1 day preceded by 1 week h/o of cough and fever

## 2020-01-09 NOTE — CONSULT NOTE ADULT - SUBJECTIVE AND OBJECTIVE BOX
ALVARO OLIVERA  69y, Female  Allergy: No Known Allergies      CHIEF COMPLAINT: palpitation and SOB for 1 day preceded by 1 week h/o of cough and fever (2020 07:16)      HPI:  69 years old female pt with past medical hx of HTN and hypothyroidism came to ER because of 1 day h/o of palpitation.  As per patient for last 1 week, he is sick, has flu like symptoms cough associated with whitish to yellow sputum, fever associated with chills, she thought its flu, continued supportive therapy with anti tussives, but her condition didnt improved she remained febrile, yesterday she started having palpitation denies any chest pain , has minimal SOB with exertion.  She never had palpitations in the past. She saw her primary 3 months ago, all her labs were normal  She denies any lower leg swelling.  she denies any headaches visual problem, no weakness, no GI or urinary complaints  denies any sick contact or recent travel.  She has been mobile, compliant with her medications.  While in ER, she was found to be in Afib with RVR, started on cardizem drip, she was febrile to 38.1, started on ceftriaxone and azithromycin, s/p 2 L IV bolus (2020 11:28)      INFECTIOUS DISEASE HISTORY:  +mycoplasma     PAST MEDICAL & SURGICAL HISTORY:  Hypothyroid  Hypertension  S/P cholecystectomy      FAMILY HISTORY  FH: heart disease      SOCIAL HISTORY    Pt denies any current heavy ETOH use, IVDU. No recent travel outside the [place].       ROS  General: Denies rigors, nightsweats  HEENT: Denies headache, rhinorrhea, sore throat, eye pain  CV: Denies CP, palpitations  PULM: as noted above   GI: Denies hematemesis, hematochezia, melena  : Denies discharge, hematuria  MSK: Denies arthralgias, myalgias  SKIN: Denies rash, lesions  NEURO: Denies paresthesias, weakness  PSYCH: Denies depression, anxiety    VITALS:  T(F): 97.9, Max: 102 (20 @ 17:16)  HR: 63  BP: 115/60  RR: 18Vital Signs Last 24 Hrs  T(C): 36.6 (2020 06:12), Max: 38.9 (2020 17:16)  T(F): 97.9 (2020 06:12), Max: 102 (2020 17:16)  HR: 63 (2020 06:12) (55 - 67)  BP: 115/60 (2020 06:12) (113/64 - 118/54)  BP(mean): 79 (2020 22:10) (79 - 79)  RR: 18 (2020 06:12) (17 - 18)  SpO2: 95% (2020 05:30) (95% - 95%)    PHYSICAL EXAM:  Gen: NAD, resting in bed  HEENT: Normocephalic, atraumatic  Neck: supple, no lymphadenopathy  CV: Regular rate & regular rhythm  Lungs: L rhonchi   Abdomen: Soft, BS present  Ext: Warm, well perfused  Neuro: non focal, awake  Skin: no rash, no erythema  Lines: no phlebitis    TESTS & MEASUREMENTS:                        12.5   9.40  )-----------( 207      ( 2020 08:40 )             36.5     01-08    141  |  103  |  10  ----------------------------<  120<H>  3.5   |  22  |  0.6<L>    Ca    8.5      2020 08:40  Phos  2.3     -  Mg     2.0         TPro  6.7  /  Alb  4.3  /  TBili  0.5  /  DBili  x   /  AST  36  /  ALT  17  /  AlkPhos  45  -07    eGFR if Non African American: 92 mL/min/1.73M2 (20 @ 08:40)  eGFR if : 106 mL/min/1.73M2 (20 @ 08:40)    LIVER FUNCTIONS - ( 2020 08:32 )  Alb: 4.3 g/dL / Pro: 6.7 g/dL / ALK PHOS: 45 U/L / ALT: 17 U/L / AST: 36 U/L / GGT: x           Urinalysis Basic - ( 2020 10:58 )    Color: Yellow / Appearance: Clear / S.012 / pH: x  Gluc: x / Ketone: Negative  / Bili: Negative / Urobili: <2 mg/dL   Blood: x / Protein: 30 mg/dL / Nitrite: Negative   Leuk Esterase: Moderate / RBC: 2 /HPF / WBC 5 /HPF   Sq Epi: x / Non Sq Epi: 4 /HPF / Bacteria: Few        Culture - Urine (collected 20 @ 10:58)  Source: .Urine Clean Catch (Midstream)  Final Report (20 @ 13:18):    <10,000 CFU/mL Normal Urogenital Alvaro    Culture - Blood (collected 20 @ 08:56)  Source: .Blood Blood-Peripheral  Preliminary Report (20 @ 18:02):    No growth to date.    Culture - Blood (collected 20 @ 08:56)  Source: .Blood Blood-Peripheral  Preliminary Report (20 @ 18:02):    No growth to date.        Lactate, Blood: 1.5 mmol/L (20 @ 08:56)  Blood Gas Venous - Lactate: 2.0 mmoL/L (20 @ 08:25)      INFECTIOUS DISEASES TESTING  Hepatitis C Virus Interpretation: Nonreact (20 @ 08:40)      RADIOLOGY & ADDITIONAL TESTS:  I have personally reviewed the last Chest xray  CXR      CT      CARDIOLOGY TESTING  12 Lead ECG:   Ventricular Rate 55 BPM    Atrial Rate 55 BPM    P-R Interval 152 ms    QRS Duration 88 ms    Q-T Interval 428 ms    QTC Calculation(Bezet) 409 ms    P Axis 59 degrees    R Axis -10 degrees    T Axis -73 degrees    Diagnosis Line Sinus bradycardia  Nonspecific ST and T wave abnormality  Abnormal ECG    Confirmed by JOSE CARLOS FRANCO MD ([id number]) on 2020 4:26:55 PM (20 @ 15:06)  Transthoracic Echocardiogram:    EXAM:  2-D ECHO (TTE) COMPLETE        PROCEDURE DATE:  2020      INTERPRETATION:  REPORT:    TRANSTHORACIC ECHOCARDIOGRAM REPORT         Patient Name:   ALVARO OLIVERA Accession #: [id number]  Medical Rec #:  [id number]        Height:      61.0 in 154.9 cm  YOB: 1950        Weight:      145.0 lb 65.77 kg  Patient Age:    69 years         BSA:         1.65 m²  Patient Gender: F                BP:          101/55 mmHg       Date of Exam:        2020 2:20:39 PM  ReferringPhysician: [id number] ED UNASSIGNED  Sonographer:         Amy Serrano  Reading Physician:   Jose Carlos Franco MD.    Procedure:     2D Echo/Doppler/Color Doppler Complete.  Indications:   R00.2 - Palpitations  Diagnosis:     Palpitations - R00.2  Study Details: Technically fair study.         Summary:   1. Left ventricular ejection fraction, by visual estimation, is 60 to 65%.   2. Normal left ventricular size and wall thicknesses, with normal systolic and diastolic function.   3. Mildly enlarged right atrium.   4. LA volume Index is 43.9 ml/m² ml/m2.    PHYSICIAN INTERPRETATION:  Left Ventricle: Normal left ventricular size and wall thicknesses, with normal systolic and diastolic function. Left ventricular ejection fraction, by visualestimation, is 60 to 65%.       LV Wall Scoring:  All segments are normal.    Right Ventricle: Normal right ventricular size and function.  Left Atrium: Moderately enlarged left atrium. LA volume Index is 43.9 ml/m² ml/m2.  Right Atrium: Mildly enlarged right atrium.  Pericardium: There is no evidence of pericardial effusion.  Mitral Valve: Structurally normal mitral valve, with normal leaflet excursion. The mitral valve is normal in structure. No evidence of mitral valve regurgitation is seen.  Tricuspid Valve: Structurally normal tricuspid valve, with normal leaflet excursion. The tricuspid valve is normal in structure. Trivial tricuspid regurgitation is visualized.  Aortic Valve: Normal trileaflet aortic valve with normal opening. The aortic valve is normal. No evidence of aortic valve regurgitation is seen.  Pulmonic Valve: Structurally normal pulmonic valve, with normal leaflet excursion. The pulmonic valve is normal. Mild pulmonic valve regurgitation.  Aorta: The aortic root and ascending aorta are structurally normal, with no evidence of dilitation.  Pulmonary Artery: The main pulmonary artery is normal in size.  Venous: The inferior vena cava was normal sized, with respiratory size variation greater than 50%. Patient on Mechanical ventilation unable to assess Right Atrial pressure.       2D AND M-MODE MEASUREMENTS (normal ranges within parentheses):  Left Ventricle:                  Normal   Right Ventricle:  IVSd (2D):              0.96 cm (0.7-1.1) TAPSE:           2.00 cm  LVPWd (2D):             0.90 cm (0.7-1.1)  LVIDd (2D):             4.50 cm (3.4-5.7)  LVIDs (2D):             3.24 cm  LV FS (2D):             28.0 %   (>25%)  Relative Wall Thickness  0.40    (<0.42)    SPECTRAL DOPPLER ANALYSIS:  Aortic Valve:  AoV VMax:    1.25 m/s AoV Area, Vmax: 2.50 cm² Vmax Indx: 1.52 cm²/m²  AoV Pk Grad: 6.3 mmHg AoV Area, VTI:  2.55 cm² VTI Indx:  1.55 cm²/m²    LVOT Vmax: 1.10 m/s  LVOT VTI:  0.20 m  LVOT Diam: 1.91 cm    Tricuspid Valve and PA/RV Systolic Pressure: TR Max Velocity: 2.64 m/s RA Pressure: 3 mmHg RVSP/PASP: 30.9 mmHg       [id number] Jose Carlos Franco MD, Electronically signed on 2020 at 4:21:38 PM              *** Final ***                    JOSE CARLOS FRANCO MD  This document has been electronically signed. 2020  2:20PM             (20 @ 14:20)      MEDICATIONS  apixaban 5  levoFLOXacin IVPB   levoFLOXacin IVPB 750  levothyroxine 50  sodium chloride 0.9%. 1000      ANTIBIOTICS:  levoFLOXacin IVPB      levoFLOXacin IVPB 750 milliGRAM(s) IV Intermittent every 24 hours      ALLERGIES:  No Known Allergies

## 2020-01-09 NOTE — PROGRESS NOTE ADULT - SUBJECTIVE AND OBJECTIVE BOX
ALVARO OLIVERA  69y, Female  Allergy: No Known Allergies      CHIEF COMPLAINT: palpitation and SOB for 1 day preceded by 1 week h/o of cough and fever (2020 07:16)      HPI:  69 years old female pt with past medical hx of HTN and hypothyroidism came to ER because of 1 day h/o of palpitation.  As per patient for last 1 week, he is sick, has flu like symptoms cough associated with whitish to yellow sputum, fever associated with chills, she thought its flu, continued supportive therapy with anti tussives, but her condition didnt improved she remained febrile, yesterday she started having palpitation denies any chest pain , has minimal SOB with exertion.  She never had palpitations in the past. She saw her primary 3 months ago, all her labs were normal  She denies any lower leg swelling.  she denies any headaches visual problem, no weakness, no GI or urinary complaints  denies any sick contact or recent travel.  She has been mobile, compliant with her medications.  While in ER, she was found to be in Afib with RVR, started on cardizem drip, she was febrile to 38.1, started on ceftriaxone and azithromycin, s/p 2 L IV bolus (2020 11:28)      INFECTIOUS DISEASE HISTORY:  +mycoplasma     PAST MEDICAL & SURGICAL HISTORY:  Hypothyroid  Hypertension  S/P cholecystectomy      FAMILY HISTORY  FH: heart disease      SOCIAL HISTORY    Pt denies any current heavy ETOH use, IVDU. No recent travel outside the [place].       ROS  General: Denies rigors, nightsweats  HEENT: Denies headache, rhinorrhea, sore throat, eye pain  CV: Denies CP, palpitations  PULM: as noted above   GI: Denies hematemesis, hematochezia, melena  : Denies discharge, hematuria  MSK: Denies arthralgias, myalgias  SKIN: Denies rash, lesions  NEURO: Denies paresthesias, weakness  PSYCH: Denies depression, anxiety    VITALS:  T(F): 97.9, Max: 102 (20 @ 17:16)  HR: 63  BP: 115/60  RR: 18Vital Signs Last 24 Hrs  T(C): 36.6 (2020 06:12), Max: 38.9 (2020 17:16)  T(F): 97.9 (2020 06:12), Max: 102 (2020 17:16)  HR: 63 (2020 06:12) (55 - 67)  BP: 115/60 (2020 06:12) (113/64 - 118/54)  BP(mean): 79 (2020 22:10) (79 - 79)  RR: 18 (2020 06:12) (17 - 18)  SpO2: 95% (2020 05:30) (95% - 95%)    PHYSICAL EXAM:  Gen: NAD, resting in bed  HEENT: Normocephalic, atraumatic  Neck: supple, no lymphadenopathy  CV: Regular rate & regular rhythm  Lungs: decreased BS at bases, no fremitus  Abdomen: Soft, BS present  Ext: Warm, well perfused  Neuro: non focal, awake  Skin: no rash, no erythema  Lines: no phlebitis    TESTS & MEASUREMENTS:                        12.5   9.40  )-----------( 207      ( 2020 08:40 )             36.5     01-08    141  |  103  |  10  ----------------------------<  120<H>  3.5   |  22  |  0.6<L>    Ca    8.5      2020 08:40  Phos  2.3     -  Mg     2.0         TPro  6.7  /  Alb  4.3  /  TBili  0.5  /  DBili  x   /  AST  36  /  ALT  17  /  AlkPhos  45  -07    eGFR if Non African American: 92 mL/min/1.73M2 (20 @ 08:40)  eGFR if : 106 mL/min/1.73M2 (20 @ 08:40)    LIVER FUNCTIONS - ( 2020 08:32 )  Alb: 4.3 g/dL / Pro: 6.7 g/dL / ALK PHOS: 45 U/L / ALT: 17 U/L / AST: 36 U/L / GGT: x           Urinalysis Basic - ( 2020 10:58 )    Color: Yellow / Appearance: Clear / S.012 / pH: x  Gluc: x / Ketone: Negative  / Bili: Negative / Urobili: <2 mg/dL   Blood: x / Protein: 30 mg/dL / Nitrite: Negative   Leuk Esterase: Moderate / RBC: 2 /HPF / WBC 5 /HPF   Sq Epi: x / Non Sq Epi: 4 /HPF / Bacteria: Few        Culture - Urine (collected 20 @ 10:58)  Source: .Urine Clean Catch (Midstream)  Final Report (20 @ 13:18):    <10,000 CFU/mL Normal Urogenital Alvaro    Culture - Blood (collected 20 @ 08:56)  Source: .Blood Blood-Peripheral  Preliminary Report (20 @ 18:02):    No growth to date.    Culture - Blood (collected 20 @ 08:56)  Source: .Blood Blood-Peripheral  Preliminary Report (20 @ 18:02):    No growth to date.        Lactate, Blood: 1.5 mmol/L (20 @ 08:56)  Blood Gas Venous - Lactate: 2.0 mmoL/L (20 @ 08:25)      INFECTIOUS DISEASES TESTING  Hepatitis C Virus Interpretation: Nonreact (20 @ 08:40)      RADIOLOGY & ADDITIONAL TESTS:  I have personally reviewed the last Chest xray  CXR      CT      CARDIOLOGY TESTING  12 Lead ECG:   Ventricular Rate 55 BPM    Atrial Rate 55 BPM    P-R Interval 152 ms    QRS Duration 88 ms    Q-T Interval 428 ms    QTC Calculation(Bezet) 409 ms    P Axis 59 degrees    R Axis -10 degrees    T Axis -73 degrees    Diagnosis Line Sinus bradycardia  Nonspecific ST and T wave abnormality  Abnormal ECG    Confirmed by JOSE CARLOS FRANCO MD ([id number]) on 2020 4:26:55 PM (20 @ 15:06)  Transthoracic Echocardiogram:    EXAM:  2-D ECHO (TTE) COMPLETE        PROCEDURE DATE:  2020      INTERPRETATION:  REPORT:    TRANSTHORACIC ECHOCARDIOGRAM REPORT         Patient Name:   ALVARO OLIVERA Accession #: [id number]  Medical Rec #:  [id number]        Height:      61.0 in 154.9 cm  YOB: 1950        Weight:      145.0 lb 65.77 kg  Patient Age:    69 years         BSA:         1.65 m²  Patient Gender: F                BP:          101/55 mmHg       Date of Exam:        2020 2:20:39 PM  ReferringPhysician: [id number] ED UNASSIGNED  Sonographer:         Amy Serrano  Reading Physician:   Jose Carlos Franco MD.    Procedure:     2D Echo/Doppler/Color Doppler Complete.  Indications:   R00.2 - Palpitations  Diagnosis:     Palpitations - R00.2  Study Details: Technically fair study.         Summary:   1. Left ventricular ejection fraction, by visual estimation, is 60 to 65%.   2. Normal left ventricular size and wall thicknesses, with normal systolic and diastolic function.   3. Mildly enlarged right atrium.   4. LA volume Index is 43.9 ml/m² ml/m2.    PHYSICIAN INTERPRETATION:  Left Ventricle: Normal left ventricular size and wall thicknesses, with normal systolic and diastolic function. Left ventricular ejection fraction, by visualestimation, is 60 to 65%.       LV Wall Scoring:  All segments are normal.    Right Ventricle: Normal right ventricular size and function.  Left Atrium: Moderately enlarged left atrium. LA volume Index is 43.9 ml/m² ml/m2.  Right Atrium: Mildly enlarged right atrium.  Pericardium: There is no evidence of pericardial effusion.  Mitral Valve: Structurally normal mitral valve, with normal leaflet excursion. The mitral valve is normal in structure. No evidence of mitral valve regurgitation is seen.  Tricuspid Valve: Structurally normal tricuspid valve, with normal leaflet excursion. The tricuspid valve is normal in structure. Trivial tricuspid regurgitation is visualized.  Aortic Valve: Normal trileaflet aortic valve with normal opening. The aortic valve is normal. No evidence of aortic valve regurgitation is seen.  Pulmonic Valve: Structurally normal pulmonic valve, with normal leaflet excursion. The pulmonic valve is normal. Mild pulmonic valve regurgitation.  Aorta: The aortic root and ascending aorta are structurally normal, with no evidence of dilitation.  Pulmonary Artery: The main pulmonary artery is normal in size.  Venous: The inferior vena cava was normal sized, with respiratory size variation greater than 50%. Patient on Mechanical ventilation unable to assess Right Atrial pressure.       2D AND M-MODE MEASUREMENTS (normal ranges within parentheses):  Left Ventricle:                  Normal   Right Ventricle:  IVSd (2D):              0.96 cm (0.7-1.1) TAPSE:           2.00 cm  LVPWd (2D):             0.90 cm (0.7-1.1)  LVIDd (2D):             4.50 cm (3.4-5.7)  LVIDs (2D):             3.24 cm  LV FS (2D):             28.0 %   (>25%)  Relative Wall Thickness  0.40    (<0.42)    SPECTRAL DOPPLER ANALYSIS:  Aortic Valve:  AoV VMax:    1.25 m/s AoV Area, Vmax: 2.50 cm² Vmax Indx: 1.52 cm²/m²  AoV Pk Grad: 6.3 mmHg AoV Area, VTI:  2.55 cm² VTI Indx:  1.55 cm²/m²    LVOT Vmax: 1.10 m/s  LVOT VTI:  0.20 m  LVOT Diam: 1.91 cm    Tricuspid Valve and PA/RV Systolic Pressure: TR Max Velocity: 2.64 m/s RA Pressure: 3 mmHg RVSP/PASP: 30.9 mmHg       [id number] Jose Carlos Franco MD, Electronically signed on 2020 at 4:21:38 PM              *** Final ***                    JOSE CARLOS FRANCO MD  This document has been electronically signed. 2020  2:20PM             (20 @ 14:20)      MEDICATIONS  apixaban 5  levoFLOXacin IVPB   levoFLOXacin IVPB 750  levothyroxine 50  sodium chloride 0.9%. 1000      ANTIBIOTICS:  levoFLOXacin IVPB      levoFLOXacin IVPB 750 milliGRAM(s) IV Intermittent every 24 hours      ALLERGIES:  No Known Allergies

## 2020-01-10 ENCOUNTER — TRANSCRIPTION ENCOUNTER (OUTPATIENT)
Age: 70
End: 2020-01-10

## 2020-01-10 VITALS
TEMPERATURE: 98 F | SYSTOLIC BLOOD PRESSURE: 123 MMHG | HEART RATE: 57 BPM | DIASTOLIC BLOOD PRESSURE: 59 MMHG | RESPIRATION RATE: 19 BRPM

## 2020-01-10 LAB
ALBUMIN SERPL ELPH-MCNC: 3.6 G/DL — SIGNIFICANT CHANGE UP (ref 3.5–5.2)
ALP SERPL-CCNC: 51 U/L — SIGNIFICANT CHANGE UP (ref 30–115)
ALT FLD-CCNC: 58 U/L — HIGH (ref 0–41)
ANION GAP SERPL CALC-SCNC: 13 MMOL/L — SIGNIFICANT CHANGE UP (ref 7–14)
AST SERPL-CCNC: 66 U/L — HIGH (ref 0–41)
BASOPHILS # BLD AUTO: 0.04 K/UL — SIGNIFICANT CHANGE UP (ref 0–0.2)
BASOPHILS NFR BLD AUTO: 0.5 % — SIGNIFICANT CHANGE UP (ref 0–1)
BILIRUB SERPL-MCNC: 0.4 MG/DL — SIGNIFICANT CHANGE UP (ref 0.2–1.2)
BUN SERPL-MCNC: 9 MG/DL — LOW (ref 10–20)
CALCIUM SERPL-MCNC: 8.9 MG/DL — SIGNIFICANT CHANGE UP (ref 8.5–10.1)
CHLORIDE SERPL-SCNC: 106 MMOL/L — SIGNIFICANT CHANGE UP (ref 98–110)
CO2 SERPL-SCNC: 23 MMOL/L — SIGNIFICANT CHANGE UP (ref 17–32)
CREAT SERPL-MCNC: 0.6 MG/DL — LOW (ref 0.7–1.5)
EOSINOPHIL # BLD AUTO: 0.08 K/UL — SIGNIFICANT CHANGE UP (ref 0–0.7)
EOSINOPHIL NFR BLD AUTO: 0.9 % — SIGNIFICANT CHANGE UP (ref 0–8)
GLUCOSE SERPL-MCNC: 111 MG/DL — HIGH (ref 70–99)
HCT VFR BLD CALC: 38.2 % — SIGNIFICANT CHANGE UP (ref 37–47)
HGB BLD-MCNC: 13.4 G/DL — SIGNIFICANT CHANGE UP (ref 12–16)
IMM GRANULOCYTES NFR BLD AUTO: 1.3 % — HIGH (ref 0.1–0.3)
LYMPHOCYTES # BLD AUTO: 1.22 K/UL — SIGNIFICANT CHANGE UP (ref 1.2–3.4)
LYMPHOCYTES # BLD AUTO: 14.3 % — LOW (ref 20.5–51.1)
MAGNESIUM SERPL-MCNC: 2.1 MG/DL — SIGNIFICANT CHANGE UP (ref 1.8–2.4)
MCHC RBC-ENTMCNC: 31 PG — SIGNIFICANT CHANGE UP (ref 27–31)
MCHC RBC-ENTMCNC: 35.1 G/DL — SIGNIFICANT CHANGE UP (ref 32–37)
MCV RBC AUTO: 88.4 FL — SIGNIFICANT CHANGE UP (ref 81–99)
MONOCYTES # BLD AUTO: 0.72 K/UL — HIGH (ref 0.1–0.6)
MONOCYTES NFR BLD AUTO: 8.5 % — SIGNIFICANT CHANGE UP (ref 1.7–9.3)
NEUTROPHILS # BLD AUTO: 6.34 K/UL — SIGNIFICANT CHANGE UP (ref 1.4–6.5)
NEUTROPHILS NFR BLD AUTO: 74.5 % — SIGNIFICANT CHANGE UP (ref 42.2–75.2)
NRBC # BLD: 0 /100 WBCS — SIGNIFICANT CHANGE UP (ref 0–0)
PLATELET # BLD AUTO: 281 K/UL — SIGNIFICANT CHANGE UP (ref 130–400)
POTASSIUM SERPL-MCNC: 4.3 MMOL/L — SIGNIFICANT CHANGE UP (ref 3.5–5)
POTASSIUM SERPL-SCNC: 4.3 MMOL/L — SIGNIFICANT CHANGE UP (ref 3.5–5)
PROT SERPL-MCNC: 5.9 G/DL — LOW (ref 6–8)
RBC # BLD: 4.32 M/UL — SIGNIFICANT CHANGE UP (ref 4.2–5.4)
RBC # FLD: 12.7 % — SIGNIFICANT CHANGE UP (ref 11.5–14.5)
SODIUM SERPL-SCNC: 142 MMOL/L — SIGNIFICANT CHANGE UP (ref 135–146)
WBC # BLD: 8.51 K/UL — SIGNIFICANT CHANGE UP (ref 4.8–10.8)
WBC # FLD AUTO: 8.51 K/UL — SIGNIFICANT CHANGE UP (ref 4.8–10.8)

## 2020-01-10 PROCEDURE — 99231 SBSQ HOSP IP/OBS SF/LOW 25: CPT

## 2020-01-10 RX ORDER — AMLODIPINE BESYLATE 2.5 MG/1
1 TABLET ORAL
Qty: 0 | Refills: 0 | DISCHARGE

## 2020-01-10 RX ORDER — APIXABAN 2.5 MG/1
1 TABLET, FILM COATED ORAL
Qty: 60 | Refills: 0
Start: 2020-01-10 | End: 2020-02-08

## 2020-01-10 RX ORDER — BISOPROLOL FUMARATE 10 MG/1
1 TABLET, FILM COATED ORAL
Qty: 30 | Refills: 0
Start: 2020-01-10 | End: 2020-02-08

## 2020-01-10 RX ORDER — POTASSIUM CHLORIDE 20 MEQ
40 PACKET (EA) ORAL ONCE
Refills: 0 | Status: DISCONTINUED | OUTPATIENT
Start: 2020-01-10 | End: 2020-01-10

## 2020-01-10 RX ORDER — BISOPROLOL FUMARATE 10 MG/1
1 TABLET, FILM COATED ORAL
Qty: 0 | Refills: 0 | DISCHARGE

## 2020-01-10 RX ORDER — AZITHROMYCIN 500 MG/1
250 TABLET, FILM COATED ORAL
Qty: 4 | Refills: 0
Start: 2020-01-10 | End: 2020-01-13

## 2020-01-10 RX ADMIN — APIXABAN 5 MILLIGRAM(S): 2.5 TABLET, FILM COATED ORAL at 05:24

## 2020-01-10 RX ADMIN — AZITHROMYCIN 250 MILLIGRAM(S): 500 TABLET, FILM COATED ORAL at 11:50

## 2020-01-10 RX ADMIN — Medication 5 MILLILITER(S): at 13:27

## 2020-01-10 RX ADMIN — Medication 50 MICROGRAM(S): at 05:24

## 2020-01-10 NOTE — PROGRESS NOTE ADULT - ASSESSMENT
ASSESSMENT  69 years old female pt with past medical hx of HTN and hypothyroidism came to ER because of 1 day h/o of palpitations, fever, cough     IMPRESSION  #Mycoplasma PNA    < from: Xray Chest 1 View-PORTABLE IMMEDIATE (01.08.20 @ 12:01) >Patchy bilateral opacifications.  #Lactic acidosis  Lactate: 2.0 mmoL/L (01-07-20 @ 08:25)    RECOMMENDATIONS  - Azithro 250mg daily to complete 5-7 days pending clinical response (QTC Calculation(Bezet) 409 ms)  - Droplet     Spectra [id number]

## 2020-01-10 NOTE — PROGRESS NOTE ADULT - ASSESSMENT
69 years old female pt with past medical hx of HTN and hypothyroidism came to ER because of 1 day h/o of palpitation, and 1 week H/o of cough and fever.    # Sepsis secondary to CAP. Community acquired pneumonia. Mycoplasma pneumonia      Bilateral opacity now      currently afebrile, was on ceftriaxone/zithromax changed to levaquin 1/8/20     RVP shows mycoplasma pneumoniae  On zithromax now     # New onset Afib with RVR     etiology ? thyroid related/ infection induced ischemic     cardiology on board     started on cardizem drip then went into sinus     Eliquis 5 mg BID     echo shows EF 60, no dysfunction     TSH 2,45, T4 6.5    # HTN     BP x 24 hours: BP:  (113/64 - 118/54)    # Hypothyroidism     on levothyroxine 50 mcg daily    # DVT prophylaxis pt on eliquis for Afib    # DASH diet    # activity ambulate as tolerated 69 years old female pt with past medical hx of HTN and hypothyroidism came to ER because of 1 day h/o of palpitation, and 1 week H/o of cough and fever.    # Sepsis secondary to CAP. Community acquired pneumonia. Mycoplasma pneumonia      xray shows patchy bilateral opacifications     was febrile last night to 101.5     RVP shows mycoplasma pneumoniae, on zithromax     # New onset Afib with RVR     etiology ? thyroid related/ infection induced ischemic     cardiology on board     started on cardizem drip then went into sinus     Eliquis 5 mg BID     echo shows EF 60, no dysfunction     TSH 2,45, T4 6.5    # HTN     BP x 24 hours: BP:  (113/64 - 118/54)    # Hypothyroidism     on levothyroxine 50 mcg daily    # DVT prophylaxis pt on eliquis for Afib    # DASH diet    # activity ambulate as tolerated 69 years old female pt with past medical hx of HTN and hypothyroidism came to ER because of 1 day h/o of palpitation, and 1 week H/o of cough and fever.    # Sepsis secondary to CAP. Community acquired pneumonia. Mycoplasma pneumonia      xray shows patchy bilateral opacifications     was febrile last night to 101.5     RVP shows mycoplasma pneumoniae, on zithromax     # New onset Afib with RVR     etiology likely infection     cardiology on board     started on cardizem drip then went into sinus     Eliquis 5 mg BID     echo shows EF 60, no dysfunction     TSH 2,45, T4 6.5    # HTN     BP x 24 hours: BP:  (121/58 - 139/66)    # Hypothyroidism     on levothyroxine 50 mcg daily    PLAN:     # DVT prophylaxis pt on eliquis for Afib  # DASH diet  # activity ambulate as tolerated 69 years old female pt with past medical hx of HTN and hypothyroidism came to ER because of 1 day h/o of palpitation, and 1 week H/o of cough and fever.    # Sepsis secondary to CAP. Community acquired pneumonia. Mycoplasma pneumonia      xray shows patchy bilateral opacifications     was febrile last night to 101.5     RVP shows mycoplasma pneumoniae, on zithromax     # New onset Afib with RVR     etiology likely infection     cardiology on board     started on cardizem drip then went into sinus     Eliquis 5 mg BID     echo shows EF 60, no dysfunction     TSH 2,45, T4 6.5    # HTN     BP x 24 hours: BP:  (121/58 - 139/66)    # Hypothyroidism     on levothyroxine 50 mcg daily    PLAN: continue zithromax, monitor fever curve, currently afebrile will get second dose of zithromax    # DVT prophylaxis pt on eliquis for Afib  # DASH diet  # activity ambulate as tolerated 69 years old female pt with past medical hx of HTN and hypothyroidism came to ER because of 1 day h/o of palpitation, and 1 week H/o of cough and fever.    # Sepsis(POA) secondary to CAP/ Community acquired pneumonia. Mycoplasma pneumonia      xray shows patchy bilateral opacifications     was febrile last night to 101.5     RVP shows mycoplasma pneumoniae, on zithromax     # New onset Afib with RVR     etiology likely infection     cardiology on board     started on cardizem drip then went into sinus     Eliquis 5 mg BID     echo shows EF 60, no dysfunction     TSH 2,45, T4 6.5    # HTN     BP x 24 hours: BP:  (121/58 - 139/66)    # Hypothyroidism     on levothyroxine 50 mcg daily    PLAN: continue zithromax, monitor fever curve, currently afebrile will get second dose of zithromax    # DVT prophylaxis pt on eliquis for Afib  # DASH diet  # activity ambulate as tolerated

## 2020-01-10 NOTE — DISCHARGE NOTE NURSING/CASE MANAGEMENT/SOCIAL WORK - PATIENT PORTAL LINK FT
You can access the FollowMyHealth Patient Portal offered by [facility] by registering at the following website: [email or web address]. By joining [facility]’s FollowMyHealth portal, you will also be able to view your health information using other applications (apps) compatible with our system.

## 2020-01-10 NOTE — PROGRESS NOTE ADULT - ATTENDING COMMENTS
d/c plan by tomorrow.   Cough will persists for sometime since this is mycoplasma pneumonia
Patient is comfortable to go home and wants to go home. D/C plan with out patient follow ups   d/w family.

## 2020-01-10 NOTE — PROGRESS NOTE ADULT - SUBJECTIVE AND OBJECTIVE BOX
SUBJECTIVE:    Patient is a 69y old Female who presents with a chief complaint of palpitation and SOB for 1 day preceded by 1 week h/o of cough and fever (10 Saul 2020 08:34)      HPI:  69 years old female pt with past medical hx of HTN and hypothyroidism came to ER because of 1 day h/o of palpitation.  As per patient for last 1 week, he is sick, has flu like symptoms cough associated with whitish to yellow sputum, fever associated with chills, she thought its flu, continued supportive therapy with anti tussives, but her condition didnt improved she remained febrile, yesterday she started having palpitation denies any chest pain , has minimal SOB with exertion.  She never had palpitations in the past. She saw her primary 3 months ago, all her labs were normal  She denies any lower leg swelling.  she denies any headaches visual problem, no weakness, no GI or urinary complaints  denies any sick contact or recent travel.  She has been mobile, compliant with her medications.  While in ER, she was found to be in Afib with RVR, started on cardizem drip, she was febrile to 38.1, started on ceftriaxone and azithromycin, s/p 2 L IV bolus (07 Jan 2020 11:28)      Currently admitted to medicine with the primary diagnosis of Atrial fibrillation with rapid ventricular response     continuing to cough, not feelin gshort of breath on oxygen on 2L currently    Besides the pertinent positives and negatives described above, the ROS was within normal limits.    PAST MEDICAL & SURGICAL HISTORY  Hypothyroid  Hypertension  S/P cholecystectomy    SOCIAL HISTORY:    ALLERGIES:  No Known Allergies    MEDICATIONS:  STANDING MEDICATIONS  apixaban 5 milliGRAM(s) Oral every 12 hours  azithromycin  IVPB 250 milliGRAM(s) IV Intermittent daily  levothyroxine 50 MICROGram(s) Oral daily  potassium chloride   Powder 40 milliEquivalent(s) Oral once    PRN MEDICATIONS  acetaminophen   Tablet .. 650 milliGRAM(s) Oral every 6 hours PRN  guaifenesin/dextromethorphan  Syrup 5 milliLiter(s) Oral three times a day PRN  ibuprofen  Tablet. 200 milliGRAM(s) Oral two times a day PRN    VITALS:   T(F): 98.3  HR: 58  BP: 139/66  RR: 18  SpO2: 95%    LABS:                        13.4   8.51  )-----------( 281      ( 10 Saul 2020 08:23 )             38.2     01-10    142  |  106  |  9<L>  ----------------------------<  111<H>  4.3   |  23  |  0.6<L>    Ca    8.9      10 Saul 2020 08:23  Mg     2.1     01-10    TPro  5.9<L>  /  Alb  3.6  /  TBili  0.4  /  DBili  x   /  AST  66<H>  /  ALT  58<H>  /  AlkPhos  51  01-10              Culture - Blood (collected 08 Jan 2020 08:40)  Source: .Blood None  Preliminary Report (09 Jan 2020 18:02):    No growth to date.    Culture - Urine (collected 07 Jan 2020 10:58)  Source: .Urine Clean Catch (Midstream)  Final Report (08 Jan 2020 13:18):    <10,000 CFU/mL Normal Urogenital Khadra          RADIOLOGY:    PHYSICAL EXAM:  GEN: No acute distress  LUNGS: Clear to auscultation bilaterally, continuing to cough, on 2L  HEART: Regular  ABD: Soft, non-tender, non-distended.  EXT: NC/NC/NE/2+PP/THORNTON/Skin Intact.   NEURO: AAOX3    Intravenous access: yes  NG tube: no  Lockhart Catheter: no

## 2020-01-10 NOTE — DISCHARGE NOTE PROVIDER - NSDCCPCAREPLAN_GEN_ALL_CORE_FT
PRINCIPAL DISCHARGE DIAGNOSIS  Diagnosis: Atrial fibrillation with rapid ventricular response  Assessment and Plan of Treatment: you were diagnosed with AFIB whoch was likle due to infection, you are started on blood thinner eliquis 5 mg BID.  you have to be very cautious, avoid heavy exercise, brush ur teeth with soft brush, if you start bleeding anywhere hold ur eliquis dose, and come to ER      SECONDARY DISCHARGE DIAGNOSES  Diagnosis: Mycoplasma pneumonia  Assessment and Plan of Treatment: you are started on azithromycin, you need to take 4 more days to complete a total of 7 days

## 2020-01-10 NOTE — PROGRESS NOTE ADULT - SUBJECTIVE AND OBJECTIVE BOX
JOSELIN, ALVARO  69y, Female  Allergy: No Known Allergies      CHIEF COMPLAINT: palpitation and SOB for 1 day preceded by 1 week h/o of cough and fever (09 Jan 2020 09:03)      INTERVAL EVENTS/HPI  - No acute events overnight  - T(F): , Max: 101.9 (01-09-20 @ 12:24),101.5 21:00, fever curve downtrending   - Denies any worsening symptoms  - Tolerating medication  - WBC Count: 8.24 (01-09-20 @ 08:18)  WBC Count: 9.40 (01-08-20 @ 08:40)  - Creatinine, Serum: 0.5 (01-09-20 @ 08:18)  Creatinine, Serum: 0.6 (01-08-20 @ 08:40)       ROS  General: Denies rigors, nightsweats  HEENT: Denies headache, rhinorrhea, sore throat, eye pain  CV: Denies CP, palpitations  PULM: +cough   GI: Denies hematemesis, hematochezia, melena  : Denies discharge, hematuria  MSK: Denies arthralgias, myalgias  SKIN: Denies rash, lesions  NEURO: Denies paresthesias, weakness  PSYCH: Denies depression, anxiety    VITALS:  T(F): 98.3, Max: 101.9 (01-09-20 @ 12:24)  HR: 58  BP: 139/66  RR: 18Vital Signs Last 24 Hrs  T(C): 36.8 (10 Saul 2020 06:04), Max: 38.8 (09 Jan 2020 12:24)  T(F): 98.3 (10 Saul 2020 06:04), Max: 101.9 (09 Jan 2020 12:24)  HR: 58 (10 Saul 2020 06:04) (58 - 67)  BP: 139/66 (10 Saul 2020 06:04) (121/58 - 139/66)  BP(mean): 100 (09 Jan 2020 21:41) (83 - 100)  RR: 18 (10 Saul 2020 06:04) (18 - 18)  SpO2: 95% (09 Jan 2020 21:41) (93% - 95%)    PHYSICAL EXAM:  Gen: NAD, resting in bed  HEENT: Normocephalic, atraumatic  Neck: supple, no lymphadenopathy  CV: Regular rate & regular rhythm  Lungs: L rhonchi   Abdomen: Soft, BS present  Ext: Warm, well perfused  Neuro: non focal, awake  Skin: no rash, no erythema  Lines: no phlebitis      FH: Non-contributory  Social Hx: Non-contributory    TESTS & MEASUREMENTS:                        12.3   8.24  )-----------( 233      ( 09 Jan 2020 08:18 )             36.0     01-09    142  |  104  |  7<L>  ----------------------------<  114<H>  3.2<L>   |  22  |  0.5<L>    Ca    8.2<L>      09 Jan 2020 08:18  Phos  2.3     01-08  Mg     2.0     01-09    TPro  5.7<L>  /  Alb  3.6  /  TBili  0.4  /  DBili  x   /  AST  45<H>  /  ALT  33  /  AlkPhos  43  01-09      LIVER FUNCTIONS - ( 09 Jan 2020 08:18 )  Alb: 3.6 g/dL / Pro: 5.7 g/dL / ALK PHOS: 43 U/L / ALT: 33 U/L / AST: 45 U/L / GGT: x               Culture - Blood (collected 01-08-20 @ 08:40)  Source: .Blood None  Preliminary Report (01-09-20 @ 18:02):    No growth to date.    Culture - Urine (collected 01-07-20 @ 10:58)  Source: .Urine Clean Catch (Midstream)  Final Report (01-08-20 @ 13:18):    <10,000 CFU/mL Normal Urogenital Alvaro    Culture - Blood (collected 01-07-20 @ 08:56)  Source: .Blood Blood-Peripheral  Preliminary Report (01-08-20 @ 18:02):    No growth to date.    Culture - Blood (collected 01-07-20 @ 08:56)  Source: .Blood Blood-Peripheral  Preliminary Report (01-08-20 @ 18:02):    No growth to date.        Lactate, Blood: 1.5 mmol/L (01-07-20 @ 08:56)  Blood Gas Venous - Lactate: 2.0 mmoL/L (01-07-20 @ 08:25)      INFECTIOUS DISEASES TESTING  Rapid RVP Result: Detected (01-07-20 @ 15:30)      RADIOLOGY & ADDITIONAL TESTS:  I have personally reviewed the last Chest xray  CXR      CT      CARDIOLOGY TESTING  12 Lead ECG:   Ventricular Rate 55 BPM    Atrial Rate 55 BPM    P-R Interval 152 ms    QRS Duration 88 ms    Q-T Interval 428 ms    QTC Calculation(Bezet) 409 ms    P Axis 59 degrees    R Axis -10 degrees    T Axis -73 degrees    Diagnosis Line Sinus bradycardia  Nonspecific ST and T wave abnormality  Abnormal ECG    Confirmed by JOSE CARLOS FRANCO MD ([id number]) on 1/7/2020 4:26:55 PM (01-07-20 @ 15:06)  Transthoracic Echocardiogram:    EXAM:  2-D ECHO (TTE) COMPLETE        PROCEDURE DATE:  01/07/2020      INTERPRETATION:  REPORT:    TRANSTHORACIC ECHOCARDIOGRAM REPORT         Patient Name:   ALVARO OLIEVRA Accession #: [id number]  Medical Rec #:  [id number]        Height:      61.0 in 154.9 cm  YOB: 1950        Weight:      145.0 lb 65.77 kg  Patient Age:    69 years         BSA:         1.65 m²  Patient Gender: F                BP:          101/55 mmHg       Date of Exam:        1/7/2020 2:20:39 PM  ReferringPhysician: [id number] ED UNASSIGNED  Sonographer:         Amy Serrano  Reading Physician:   Jose Carlos Franco MD.    Procedure:     2D Echo/Doppler/Color Doppler Complete.  Indications:   R00.2 - Palpitations  Diagnosis:     Palpitations - R00.2  Study Details: Technically fair study.         Summary:   1. Left ventricular ejection fraction, by visual estimation, is 60 to 65%.   2. Normal left ventricular size and wall thicknesses, with normal systolic and diastolic function.   3. Mildly enlarged right atrium.   4. LA volume Index is 43.9 ml/m² ml/m2.    PHYSICIAN INTERPRETATION:  Left Ventricle: Normal left ventricular size and wall thicknesses, with normal systolic and diastolic function. Left ventricular ejection fraction, by visualestimation, is 60 to 65%.       LV Wall Scoring:  All segments are normal.    Right Ventricle: Normal right ventricular size and function.  Left Atrium: Moderately enlarged left atrium. LA volume Index is 43.9 ml/m² ml/m2.  Right Atrium: Mildly enlarged right atrium.  Pericardium: There is no evidence of pericardial effusion.  Mitral Valve: Structurally normal mitral valve, with normal leaflet excursion. The mitral valve is normal in structure. No evidence of mitral valve regurgitation is seen.  Tricuspid Valve: Structurally normal tricuspid valve, with normal leaflet excursion. The tricuspid valve is normal in structure. Trivial tricuspid regurgitation is visualized.  Aortic Valve: Normal trileaflet aortic valve with normal opening. The aortic valve is normal. No evidence of aortic valve regurgitation is seen.  Pulmonic Valve: Structurally normal pulmonic valve, with normal leaflet excursion. The pulmonic valve is normal. Mild pulmonic valve regurgitation.  Aorta: The aortic root and ascending aorta are structurally normal, with no evidence of dilitation.  Pulmonary Artery: The main pulmonary artery is normal in size.  Venous: The inferior vena cava was normal sized, with respiratory size variation greater than 50%. Patient on Mechanical ventilation unable to assess Right Atrial pressure.       2D AND M-MODE MEASUREMENTS (normal ranges within parentheses):  Left Ventricle:                  Normal   Right Ventricle:  IVSd (2D):              0.96 cm (0.7-1.1) TAPSE:           2.00 cm  LVPWd (2D):             0.90 cm (0.7-1.1)  LVIDd (2D):             4.50 cm (3.4-5.7)  LVIDs (2D):             3.24 cm  LV FS (2D):             28.0 %   (>25%)  Relative Wall Thickness  0.40    (<0.42)    SPECTRAL DOPPLER ANALYSIS:  Aortic Valve:  AoV VMax:    1.25 m/s AoV Area, Vmax: 2.50 cm² Vmax Indx: 1.52 cm²/m²  AoV Pk Grad: 6.3 mmHg AoV Area, VTI:  2.55 cm² VTI Indx:  1.55 cm²/m²    LVOT Vmax: 1.10 m/s  LVOT VTI:  0.20 m  LVOT Diam: 1.91 cm    Tricuspid Valve and PA/RV Systolic Pressure: TR Max Velocity: 2.64 m/s RA Pressure: 3 mmHg RVSP/PASP: 30.9 mmHg       [id number] Jose Carlos Franco MD, Electronically signed on 1/7/2020 at 4:21:38 PM              *** Final ***                    JOSE CARLOS FRANCO MD  This document has been electronically signed. Jan 7 2020  2:20PM             (01-07-20 @ 14:20)      MEDICATIONS  apixaban 5  azithromycin  IVPB 250  levothyroxine 50  potassium chloride   Powder 40      ANTIBIOTICS:  azithromycin  IVPB 250 milliGRAM(s) IV Intermittent daily      All available historical records have been reviewed

## 2020-01-10 NOTE — DISCHARGE NOTE PROVIDER - CARE PROVIDER_API CALL
Geovany Lomeli)  Cardiovascular Disease; Interventional Cardiology  [address] [place] [address]  Phone: [phone]  Fax: [phone]  Follow Up Time:

## 2020-01-10 NOTE — DISCHARGE NOTE PROVIDER - NSDCMRMEDTOKEN_GEN_ALL_CORE_FT
apixaban 5 mg oral tablet: 1 tab(s) orally every 12 hours  azithromycin 500 mg intravenous injection: 250 milligram(s) orally once a day   bisoprolol 5 mg oral tablet: 1 tab(s) orally once a day   levothyroxine 50 mcg (0.05 mg) oral tablet: 1 tab(s) orally once a day  losartan 100 mg oral tablet: 1 tab(s) orally once a day

## 2020-01-10 NOTE — PROGRESS NOTE ADULT - REASON FOR ADMISSION
palpitation and SOB for 1 day preceded by 1 week h/o of cough and fever

## 2020-01-10 NOTE — DISCHARGE NOTE PROVIDER - HOSPITAL COURSE
69 years old female pt with past medical hx of HTN and hypothyroidism came to ER because of 1 day h/o of palpitation.    As per patient for last 1 week, he is sick, has flu like symptoms cough associated with whitish to yellow sputum, fever associated with chills, she thought its flu, continued supportive therapy with anti tussives, but her condition didnt improved she remained febrile, yesterday she started having palpitation denies any chest pain , has minimal SOB with exertion.    .    While in ER, she was found to be in Afib with RVR, started on cardizem drip, she was febrile to 38.1, started on ceftriaxone and azithromycin, s/p 2 L IV bolus.    her Afib reverted to sinus, RVP panel showed mycoplasma.    Thus patient was treated with azithromycin for mycoplasma pneumonia, currently afebrile, maintaing 96% on room air, need to take 4 more days of azithromycin to complete the course of 7 days

## 2020-01-12 LAB
CULTURE RESULTS: SIGNIFICANT CHANGE UP
CULTURE RESULTS: SIGNIFICANT CHANGE UP
SPECIMEN SOURCE: SIGNIFICANT CHANGE UP
SPECIMEN SOURCE: SIGNIFICANT CHANGE UP

## 2020-01-13 LAB
CULTURE RESULTS: SIGNIFICANT CHANGE UP
SPECIMEN SOURCE: SIGNIFICANT CHANGE UP

## 2020-01-14 DIAGNOSIS — I10 ESSENTIAL (PRIMARY) HYPERTENSION: ICD-10-CM

## 2020-01-14 DIAGNOSIS — E87.2 ACIDOSIS: ICD-10-CM

## 2020-01-14 DIAGNOSIS — J15.7 PNEUMONIA DUE TO MYCOPLASMA PNEUMONIAE: ICD-10-CM

## 2020-01-14 DIAGNOSIS — R07.9 CHEST PAIN, UNSPECIFIED: ICD-10-CM

## 2020-01-14 DIAGNOSIS — Z82.49 FAMILY HISTORY OF ISCHEMIC HEART DISEASE AND OTHER DISEASES OF THE CIRCULATORY SYSTEM: ICD-10-CM

## 2020-01-14 DIAGNOSIS — E03.9 HYPOTHYROIDISM, UNSPECIFIED: ICD-10-CM

## 2020-01-14 DIAGNOSIS — E78.5 HYPERLIPIDEMIA, UNSPECIFIED: ICD-10-CM

## 2020-01-14 DIAGNOSIS — I48.91 UNSPECIFIED ATRIAL FIBRILLATION: ICD-10-CM

## 2020-01-14 DIAGNOSIS — A41.9 SEPSIS, UNSPECIFIED ORGANISM: ICD-10-CM

## 2020-02-27 PROBLEM — E03.9 HYPOTHYROIDISM, UNSPECIFIED: Chronic | Status: ACTIVE | Noted: 2020-01-07

## 2020-02-27 PROBLEM — I10 ESSENTIAL (PRIMARY) HYPERTENSION: Chronic | Status: ACTIVE | Noted: 2020-01-07

## 2020-03-19 ENCOUNTER — APPOINTMENT (OUTPATIENT)
Dept: GYNECOLOGIC ONCOLOGY | Facility: CLINIC | Age: 70
End: 2020-03-19

## 2020-04-07 ENCOUNTER — APPOINTMENT (OUTPATIENT)
Dept: LAB | Facility: CLINIC | Age: 70
End: 2020-04-07
Payer: MEDICARE

## 2020-04-07 ENCOUNTER — TRANSCRIBE ORDERS (OUTPATIENT)
Dept: ADMINISTRATIVE | Facility: HOSPITAL | Age: 70
End: 2020-04-07

## 2020-04-07 DIAGNOSIS — I48.91 ATRIAL FIBRILLATION, UNSPECIFIED TYPE (HCC): ICD-10-CM

## 2020-04-07 DIAGNOSIS — I48.91 ATRIAL FIBRILLATION, UNSPECIFIED TYPE (HCC): Primary | ICD-10-CM

## 2020-04-07 LAB
ALBUMIN SERPL BCP-MCNC: 3.6 G/DL (ref 3.5–5)
ALP SERPL-CCNC: 58 U/L (ref 46–116)
ALT SERPL W P-5'-P-CCNC: 93 U/L (ref 12–78)
ANION GAP SERPL CALCULATED.3IONS-SCNC: 7 MMOL/L (ref 4–13)
AST SERPL W P-5'-P-CCNC: 46 U/L (ref 5–45)
BASOPHILS # BLD AUTO: 0.07 THOUSANDS/ΜL (ref 0–0.1)
BASOPHILS NFR BLD AUTO: 1 % (ref 0–1)
BILIRUB SERPL-MCNC: 0.59 MG/DL (ref 0.2–1)
BUN SERPL-MCNC: 12 MG/DL (ref 5–25)
CALCIUM SERPL-MCNC: 9.3 MG/DL (ref 8.3–10.1)
CHLORIDE SERPL-SCNC: 110 MMOL/L (ref 100–108)
CO2 SERPL-SCNC: 23 MMOL/L (ref 21–32)
CREAT SERPL-MCNC: 0.74 MG/DL (ref 0.6–1.3)
EOSINOPHIL # BLD AUTO: 0.07 THOUSAND/ΜL (ref 0–0.61)
EOSINOPHIL NFR BLD AUTO: 1 % (ref 0–6)
ERYTHROCYTE [DISTWIDTH] IN BLOOD BY AUTOMATED COUNT: 13.3 % (ref 11.6–15.1)
GFR SERPL CREATININE-BSD FRML MDRD: 81 ML/MIN/1.73SQ M
GLUCOSE SERPL-MCNC: 94 MG/DL (ref 65–140)
HCT VFR BLD AUTO: 41.2 % (ref 34.8–46.1)
HGB BLD-MCNC: 13.4 G/DL (ref 11.5–15.4)
IMM GRANULOCYTES # BLD AUTO: 0.02 THOUSAND/UL (ref 0–0.2)
IMM GRANULOCYTES NFR BLD AUTO: 0 % (ref 0–2)
LYMPHOCYTES # BLD AUTO: 1.58 THOUSANDS/ΜL (ref 0.6–4.47)
LYMPHOCYTES NFR BLD AUTO: 17 % (ref 14–44)
MCH RBC QN AUTO: 30.7 PG (ref 26.8–34.3)
MCHC RBC AUTO-ENTMCNC: 32.5 G/DL (ref 31.4–37.4)
MCV RBC AUTO: 94 FL (ref 82–98)
MONOCYTES # BLD AUTO: 0.97 THOUSAND/ΜL (ref 0.17–1.22)
MONOCYTES NFR BLD AUTO: 10 % (ref 4–12)
NEUTROPHILS # BLD AUTO: 6.75 THOUSANDS/ΜL (ref 1.85–7.62)
NEUTS SEG NFR BLD AUTO: 71 % (ref 43–75)
NRBC BLD AUTO-RTO: 0 /100 WBCS
PLATELET # BLD AUTO: 281 THOUSANDS/UL (ref 149–390)
PMV BLD AUTO: 11.2 FL (ref 8.9–12.7)
POTASSIUM SERPL-SCNC: 4.2 MMOL/L (ref 3.5–5.3)
PROT SERPL-MCNC: 6.5 G/DL (ref 6.4–8.2)
RBC # BLD AUTO: 4.37 MILLION/UL (ref 3.81–5.12)
SODIUM SERPL-SCNC: 140 MMOL/L (ref 136–145)
WBC # BLD AUTO: 9.46 THOUSAND/UL (ref 4.31–10.16)

## 2020-04-07 PROCEDURE — 85025 COMPLETE CBC W/AUTO DIFF WBC: CPT

## 2020-04-07 PROCEDURE — 80053 COMPREHEN METABOLIC PANEL: CPT

## 2020-04-07 PROCEDURE — 36415 COLL VENOUS BLD VENIPUNCTURE: CPT

## 2020-04-09 ENCOUNTER — OFFICE VISIT (OUTPATIENT)
Dept: URGENT CARE | Facility: CLINIC | Age: 70
End: 2020-04-09
Payer: MEDICARE

## 2020-04-09 ENCOUNTER — OFFICE VISIT (OUTPATIENT)
Dept: CARDIOLOGY CLINIC | Facility: CLINIC | Age: 70
End: 2020-04-09
Payer: MEDICARE

## 2020-04-09 VITALS
HEART RATE: 96 BPM | WEIGHT: 154 LBS | BODY MASS INDEX: 29.07 KG/M2 | SYSTOLIC BLOOD PRESSURE: 114 MMHG | DIASTOLIC BLOOD PRESSURE: 76 MMHG | OXYGEN SATURATION: 93 % | HEIGHT: 61 IN

## 2020-04-09 VITALS — OXYGEN SATURATION: 94 % | TEMPERATURE: 98.9 F | HEART RATE: 118 BPM

## 2020-04-09 DIAGNOSIS — R05.9 COUGH: Primary | ICD-10-CM

## 2020-04-09 DIAGNOSIS — R05.9 COUGH: ICD-10-CM

## 2020-04-09 DIAGNOSIS — I10 ESSENTIAL HYPERTENSION: ICD-10-CM

## 2020-04-09 DIAGNOSIS — R06.02 SHORTNESS OF BREATH ON EXERTION: ICD-10-CM

## 2020-04-09 DIAGNOSIS — I48.0 PAROXYSMAL ATRIAL FIBRILLATION (HCC): ICD-10-CM

## 2020-04-09 DIAGNOSIS — Z79.01 ANTICOAGULANT LONG-TERM USE: ICD-10-CM

## 2020-04-09 PROCEDURE — 99213 OFFICE O/P EST LOW 20 MIN: CPT | Performed by: PHYSICIAN ASSISTANT

## 2020-04-09 PROCEDURE — 87635 SARS-COV-2 COVID-19 AMP PRB: CPT

## 2020-04-09 PROCEDURE — 99204 OFFICE O/P NEW MOD 45 MIN: CPT | Performed by: INTERNAL MEDICINE

## 2020-04-09 PROCEDURE — 93000 ELECTROCARDIOGRAM COMPLETE: CPT | Performed by: INTERNAL MEDICINE

## 2020-04-09 PROCEDURE — G0463 HOSPITAL OUTPT CLINIC VISIT: HCPCS | Performed by: PHYSICIAN ASSISTANT

## 2020-04-09 RX ORDER — LEVOTHYROXINE SODIUM 0.05 MG/1
50 TABLET ORAL DAILY
COMMUNITY

## 2020-04-09 RX ORDER — BISOPROLOL FUMARATE 5 MG/1
5 TABLET ORAL DAILY
COMMUNITY

## 2020-04-09 RX ORDER — DOXYCYCLINE 100 MG/1
100 TABLET ORAL 2 TIMES DAILY
Qty: 20 TABLET | Refills: 0 | Status: SHIPPED | OUTPATIENT
Start: 2020-04-09 | End: 2020-04-19

## 2020-04-09 RX ORDER — AMLODIPINE BESYLATE 2.5 MG/1
2.5 TABLET ORAL DAILY
COMMUNITY
End: 2020-04-09

## 2020-04-10 ENCOUNTER — TELEPHONE (OUTPATIENT)
Dept: PULMONOLOGY | Facility: CLINIC | Age: 70
End: 2020-04-10

## 2020-04-11 LAB — SARS-COV-2 RNA SPEC QL NAA+PROBE: NOT DETECTED

## 2020-04-12 ENCOUNTER — TELEPHONE (OUTPATIENT)
Dept: URGENT CARE | Facility: CLINIC | Age: 70
End: 2020-04-12

## 2020-04-14 ENCOUNTER — TELEMEDICINE (OUTPATIENT)
Dept: PULMONOLOGY | Facility: CLINIC | Age: 70
End: 2020-04-14
Payer: MEDICARE

## 2020-04-14 ENCOUNTER — TELEPHONE (OUTPATIENT)
Dept: PULMONOLOGY | Facility: CLINIC | Age: 70
End: 2020-04-14

## 2020-04-14 VITALS — HEIGHT: 61 IN | WEIGHT: 150 LBS | BODY MASS INDEX: 28.32 KG/M2

## 2020-04-14 DIAGNOSIS — R05.9 COUGH: Primary | ICD-10-CM

## 2020-04-14 DIAGNOSIS — I48.19 OTHER PERSISTENT ATRIAL FIBRILLATION (HCC): ICD-10-CM

## 2020-04-14 DIAGNOSIS — J90 PLEURAL EFFUSION: ICD-10-CM

## 2020-04-14 DIAGNOSIS — Z20.828 EXPOSURE TO SARS-ASSOCIATED CORONAVIRUS: Primary | ICD-10-CM

## 2020-04-14 DIAGNOSIS — R06.02 SHORTNESS OF BREATH: ICD-10-CM

## 2020-04-14 DIAGNOSIS — R09.02 HYPOXIA: ICD-10-CM

## 2020-04-14 PROCEDURE — 99214 OFFICE O/P EST MOD 30 MIN: CPT | Performed by: INTERNAL MEDICINE

## 2020-04-19 ENCOUNTER — INPATIENT (INPATIENT)
Facility: HOSPITAL | Age: 70
LOS: 4 days | Discharge: HOME | End: 2020-04-24
Attending: INTERNAL MEDICINE | Admitting: INTERNAL MEDICINE
Payer: MEDICARE

## 2020-04-19 VITALS — RESPIRATION RATE: 26 BRPM | TEMPERATURE: 98 F | OXYGEN SATURATION: 95 % | HEART RATE: 132 BPM

## 2020-04-19 DIAGNOSIS — Z90.49 ACQUIRED ABSENCE OF OTHER SPECIFIED PARTS OF DIGESTIVE TRACT: Chronic | ICD-10-CM

## 2020-04-19 LAB
ALBUMIN SERPL ELPH-MCNC: 4.2 G/DL — SIGNIFICANT CHANGE UP (ref 3.5–5.2)
ALP SERPL-CCNC: 63 U/L — SIGNIFICANT CHANGE UP (ref 30–115)
ALT FLD-CCNC: 57 U/L — HIGH (ref 0–41)
ANION GAP SERPL CALC-SCNC: 14 MMOL/L — SIGNIFICANT CHANGE UP (ref 7–14)
APTT BLD: 32.3 SEC — SIGNIFICANT CHANGE UP (ref 27–39.2)
AST SERPL-CCNC: 44 U/L — HIGH (ref 0–41)
BASE EXCESS BLDV CALC-SCNC: -2.4 MMOL/L — LOW (ref -2–2)
BASOPHILS # BLD AUTO: 0.08 K/UL — SIGNIFICANT CHANGE UP (ref 0–0.2)
BASOPHILS NFR BLD AUTO: 0.7 % — SIGNIFICANT CHANGE UP (ref 0–1)
BILIRUB SERPL-MCNC: 0.5 MG/DL — SIGNIFICANT CHANGE UP (ref 0.2–1.2)
BUN SERPL-MCNC: 13 MG/DL — SIGNIFICANT CHANGE UP (ref 10–20)
CALCIUM SERPL-MCNC: 9.6 MG/DL — SIGNIFICANT CHANGE UP (ref 8.5–10.1)
CHLORIDE SERPL-SCNC: 105 MMOL/L — SIGNIFICANT CHANGE UP (ref 98–110)
CK SERPL-CCNC: 135 U/L — SIGNIFICANT CHANGE UP (ref 0–225)
CK SERPL-CCNC: 141 U/L — SIGNIFICANT CHANGE UP (ref 0–225)
CO2 SERPL-SCNC: 19 MMOL/L — SIGNIFICANT CHANGE UP (ref 17–32)
CREAT SERPL-MCNC: 0.7 MG/DL — SIGNIFICANT CHANGE UP (ref 0.7–1.5)
D DIMER BLD IA.RAPID-MCNC: 449 NG/ML DDU — HIGH (ref 0–230)
EOSINOPHIL # BLD AUTO: 0.1 K/UL — SIGNIFICANT CHANGE UP (ref 0–0.7)
EOSINOPHIL NFR BLD AUTO: 0.9 % — SIGNIFICANT CHANGE UP (ref 0–8)
ERYTHROCYTE [SEDIMENTATION RATE] IN BLOOD: 4 MM/HR — SIGNIFICANT CHANGE UP (ref 0–20)
FIBRINOGEN PPP-MCNC: 411 MG/DL — SIGNIFICANT CHANGE UP (ref 204.4–570.6)
GLUCOSE SERPL-MCNC: 102 MG/DL — HIGH (ref 70–99)
HCO3 BLDV-SCNC: 22 MMOL/L — SIGNIFICANT CHANGE UP (ref 22–29)
HCT VFR BLD CALC: 43.3 % — SIGNIFICANT CHANGE UP (ref 37–47)
HGB BLD-MCNC: 14.7 G/DL — SIGNIFICANT CHANGE UP (ref 12–16)
IMM GRANULOCYTES NFR BLD AUTO: 0.4 % — HIGH (ref 0.1–0.3)
INR BLD: 1.43 RATIO — HIGH (ref 0.65–1.3)
LACTATE BLDV-MCNC: 1.4 MMOL/L — SIGNIFICANT CHANGE UP (ref 0.5–1.6)
LACTATE SERPL-SCNC: 1.4 MMOL/L — SIGNIFICANT CHANGE UP (ref 0.7–2)
LDH SERPL L TO P-CCNC: 361 — HIGH (ref 50–242)
LYMPHOCYTES # BLD AUTO: 1.81 K/UL — SIGNIFICANT CHANGE UP (ref 1.2–3.4)
LYMPHOCYTES # BLD AUTO: 16.4 % — LOW (ref 20.5–51.1)
MCHC RBC-ENTMCNC: 31.7 PG — HIGH (ref 27–31)
MCHC RBC-ENTMCNC: 33.9 G/DL — SIGNIFICANT CHANGE UP (ref 32–37)
MCV RBC AUTO: 93.5 FL — SIGNIFICANT CHANGE UP (ref 81–99)
MONOCYTES # BLD AUTO: 1.19 K/UL — HIGH (ref 0.1–0.6)
MONOCYTES NFR BLD AUTO: 10.7 % — HIGH (ref 1.7–9.3)
NEUTROPHILS # BLD AUTO: 7.85 K/UL — HIGH (ref 1.4–6.5)
NEUTROPHILS NFR BLD AUTO: 70.9 % — SIGNIFICANT CHANGE UP (ref 42.2–75.2)
NRBC # BLD: 0 /100 WBCS — SIGNIFICANT CHANGE UP (ref 0–0)
NT-PROBNP SERPL-SCNC: 2392 PG/ML — HIGH (ref 0–300)
PCO2 BLDV: 37 MMHG — LOW (ref 41–51)
PH BLDV: 7.39 — SIGNIFICANT CHANGE UP (ref 7.26–7.43)
PLATELET # BLD AUTO: 289 K/UL — SIGNIFICANT CHANGE UP (ref 130–400)
PO2 BLDV: 54 MMHG — HIGH (ref 20–40)
POTASSIUM SERPL-MCNC: 4.3 MMOL/L — SIGNIFICANT CHANGE UP (ref 3.5–5)
POTASSIUM SERPL-SCNC: 4.3 MMOL/L — SIGNIFICANT CHANGE UP (ref 3.5–5)
PROT SERPL-MCNC: 6.3 G/DL — SIGNIFICANT CHANGE UP (ref 6–8)
PROTHROM AB SERPL-ACNC: 16.4 SEC — HIGH (ref 9.95–12.87)
RBC # BLD: 4.63 M/UL — SIGNIFICANT CHANGE UP (ref 4.2–5.4)
RBC # FLD: 13.8 % — SIGNIFICANT CHANGE UP (ref 11.5–14.5)
SAO2 % BLDV: 87 % — SIGNIFICANT CHANGE UP
SODIUM SERPL-SCNC: 138 MMOL/L — SIGNIFICANT CHANGE UP (ref 135–146)
TROPONIN T SERPL-MCNC: <0.01 NG/ML — SIGNIFICANT CHANGE UP
TROPONIN T SERPL-MCNC: <0.01 NG/ML — SIGNIFICANT CHANGE UP
WBC # BLD: 11.07 K/UL — HIGH (ref 4.8–10.8)
WBC # FLD AUTO: 11.07 K/UL — HIGH (ref 4.8–10.8)

## 2020-04-19 PROCEDURE — 71045 X-RAY EXAM CHEST 1 VIEW: CPT | Mod: 26

## 2020-04-19 PROCEDURE — 93010 ELECTROCARDIOGRAM REPORT: CPT

## 2020-04-19 PROCEDURE — 99223 1ST HOSP IP/OBS HIGH 75: CPT | Mod: AI

## 2020-04-19 PROCEDURE — 71250 CT THORAX DX C-: CPT | Mod: 26

## 2020-04-19 PROCEDURE — 99291 CRITICAL CARE FIRST HOUR: CPT | Mod: GC

## 2020-04-19 RX ORDER — HYDROXYCHLOROQUINE SULFATE 200 MG
800 TABLET ORAL EVERY 24 HOURS
Refills: 0 | Status: COMPLETED | OUTPATIENT
Start: 2020-04-19 | End: 2020-04-19

## 2020-04-19 RX ORDER — FUROSEMIDE 40 MG
40 TABLET ORAL ONCE
Refills: 0 | Status: COMPLETED | OUTPATIENT
Start: 2020-04-19 | End: 2020-04-19

## 2020-04-19 RX ORDER — ERGOCALCIFEROL 1.25 MG/1
1 CAPSULE ORAL
Qty: 0 | Refills: 0 | DISCHARGE

## 2020-04-19 RX ORDER — GUAIFENESIN/DEXTROMETHORPHAN 600MG-30MG
10 TABLET, EXTENDED RELEASE 12 HR ORAL EVERY 4 HOURS
Refills: 0 | Status: DISCONTINUED | OUTPATIENT
Start: 2020-04-19 | End: 2020-04-24

## 2020-04-19 RX ORDER — LEVOTHYROXINE SODIUM 125 MCG
1 TABLET ORAL
Qty: 0 | Refills: 0 | DISCHARGE

## 2020-04-19 RX ORDER — PANTOPRAZOLE SODIUM 20 MG/1
40 TABLET, DELAYED RELEASE ORAL
Refills: 0 | Status: DISCONTINUED | OUTPATIENT
Start: 2020-04-19 | End: 2020-04-24

## 2020-04-19 RX ORDER — HYDROXYCHLOROQUINE SULFATE 200 MG
400 TABLET ORAL EVERY 24 HOURS
Refills: 0 | Status: DISCONTINUED | OUTPATIENT
Start: 2020-04-20 | End: 2020-04-20

## 2020-04-19 RX ORDER — ONDANSETRON 8 MG/1
4 TABLET, FILM COATED ORAL EVERY 8 HOURS
Refills: 0 | Status: DISCONTINUED | OUTPATIENT
Start: 2020-04-19 | End: 2020-04-20

## 2020-04-19 RX ORDER — METOPROLOL TARTRATE 50 MG
50 TABLET ORAL
Refills: 0 | Status: DISCONTINUED | OUTPATIENT
Start: 2020-04-19 | End: 2020-04-22

## 2020-04-19 RX ORDER — HYDROXYCHLOROQUINE SULFATE 200 MG
TABLET ORAL
Refills: 0 | Status: DISCONTINUED | OUTPATIENT
Start: 2020-04-19 | End: 2020-04-20

## 2020-04-19 RX ORDER — DILTIAZEM HCL 120 MG
10 CAPSULE, EXT RELEASE 24 HR ORAL ONCE
Refills: 0 | Status: COMPLETED | OUTPATIENT
Start: 2020-04-19 | End: 2020-04-19

## 2020-04-19 RX ORDER — DILTIAZEM HCL 120 MG
5 CAPSULE, EXT RELEASE 24 HR ORAL
Qty: 125 | Refills: 0 | Status: DISCONTINUED | OUTPATIENT
Start: 2020-04-19 | End: 2020-04-20

## 2020-04-19 RX ORDER — LOSARTAN POTASSIUM 100 MG/1
1 TABLET, FILM COATED ORAL
Qty: 0 | Refills: 0 | DISCHARGE

## 2020-04-19 RX ORDER — LEVOTHYROXINE SODIUM 125 MCG
50 TABLET ORAL DAILY
Refills: 0 | Status: DISCONTINUED | OUTPATIENT
Start: 2020-04-19 | End: 2020-04-24

## 2020-04-19 RX ORDER — FUROSEMIDE 40 MG
40 TABLET ORAL DAILY
Refills: 0 | Status: DISCONTINUED | OUTPATIENT
Start: 2020-04-20 | End: 2020-04-20

## 2020-04-19 RX ORDER — ACETAMINOPHEN 500 MG
650 TABLET ORAL EVERY 6 HOURS
Refills: 0 | Status: DISCONTINUED | OUTPATIENT
Start: 2020-04-19 | End: 2020-04-24

## 2020-04-19 RX ORDER — APIXABAN 2.5 MG/1
5 TABLET, FILM COATED ORAL EVERY 12 HOURS
Refills: 0 | Status: DISCONTINUED | OUTPATIENT
Start: 2020-04-19 | End: 2020-04-20

## 2020-04-19 RX ORDER — ERGOCALCIFEROL 1.25 MG/1
50000 CAPSULE ORAL
Refills: 0 | Status: DISCONTINUED | OUTPATIENT
Start: 2020-04-19 | End: 2020-04-24

## 2020-04-19 RX ADMIN — Medication 5 MG/HR: at 17:12

## 2020-04-19 RX ADMIN — Medication 40 MILLIGRAM(S): at 16:25

## 2020-04-19 RX ADMIN — Medication 10 MILLIGRAM(S): at 16:25

## 2020-04-19 RX ADMIN — Medication 800 MILLIGRAM(S): at 20:04

## 2020-04-19 NOTE — H&P ADULT - HISTORY OF PRESENT ILLNESS
69 year old female patient with past medical history of hypertension, Afib, hypothyroidism presented to the ED for dyspnea     In the ED : /min, RR 26, Temp 98.1, /103, atient was found to be in Afib with RVR and was given 10 mg IV of Cardizem and was then started on Cardizem infusion. Chest x ray was done and showed bilateral pleural effusions and evidence of vascular congestion (Official report pending) and was given 40 mg IV Lasix. SARS-CoV-2 PCR was sent and the patient was admitted for further management of her Afib 69 year old female patient with past medical history of hypertension, Afib, hypothyroidism presented to the ED for dyspnea     In the ED : /min, RR 26, Temp 98.1, /103, Patient was found to be in Afib with RVR and was given 10 mg IV of Cardizem and was then started on Cardizem infusion. Chest x ray was done and showed bilateral pleural effusions and evidence of vascular congestion (Official report pending) and was given 40 mg IV Lasix. SARS-CoV-2 PCR was sent and the patient was admitted for further management of her Afib 69 year old female patient with past medical history of hypertension, Afib, hypothyroidism presented to the ED for dyspnea   The patient was admitted in January for new onset Afib with atypical pneumonia and since then has been having dry cough. She also reports palpitations that started at the end of March as well as dyspnea on exertion that started 2 weeks ago and has been getting progressively worse. She denies dyspnea at rest as well as orthopnea but states that she does feel better whenever she sleeps on her abdomen. She completed a 10-day course of Doxycycline yesterday. She denies fever / chills, weight changes, sputum production, chest pain, abdominal pain, nausea / vomiting, diarrhea, urine changes. She was tested for COVID-19 April 9th and the results came back negative    In the ED : /min, RR 26, Temp 98.1, /103, Patient was found to be in Afib with RVR and was given 10 mg IV of Cardizem and was then started on Cardizem infusion. Chest x ray was done and showed bilateral pleural effusions and evidence of vascular congestion (Official report pending) and was given 40 mg IV Lasix. SARS-CoV-2 PCR was sent and the patient was admitted for further management of her Afib

## 2020-04-19 NOTE — ED PROVIDER NOTE - OBJECTIVE STATEMENT
pt is a 70 yof w/ HTN, hypothyroidism, afib on eliquis and bisoprolol here for sob worsening for 2-3 weeks since finishing treatment of mycoplasma pneumonia with azithromycin. pt came in today because she became much more SOB today. pt denies chest pain, abd pain, n/v/d, fevers. pt tested for COVID 1 week ago, negative.

## 2020-04-19 NOTE — ED PROVIDER NOTE - CLINICAL SUMMARY MEDICAL DECISION MAKING FREE TEXT BOX
Bedside echo shows small to moderate likely chronic pericardial effusion. No signs of temponade. B/l pleural effusions and diffuse B-lines concerning for new onset CHF. Troponin neg. Thyroid studies sent. Cardiology consulted. Currently on cardizem drip. Lasix IV given. Admitted to telemetry. MAR aware.

## 2020-04-19 NOTE — ED ADULT NURSE REASSESSMENT NOTE - NS ED NURSE REASSESS COMMENT FT1
Report received from previous RN. Pt assessed. Pt on cardiac monitor and 02 monitoring. Pt on 3L NC sat 95%. Pt is on cardizem drip @15; . Pt is awake, alert; Ax0x4. Pt ambulating in room independently. Pt denies any pain; pt is not in any distress. Will continue to monitor.

## 2020-04-19 NOTE — ED PROVIDER NOTE - ATTENDING CONTRIBUTION TO CARE
I personally evaluated the patient. I reviewed the Resident’s or Physician Assistant’s note (as assigned above), and agree with the findings and plan except as documented in my note.    69 y/o F w hx of hypothyroidism, mycoplasma pna, a fib on eliquis and metoprolol presents w a few weeks of dyspnea and sob. No cough. No fever. No body aches.     CONSTITUTIONAL: mild tachypnea, but NAD  SKIN: skin exam is warm and dry, no acute rash.  HEAD: Normocephalic; atraumatic.  EYES: PERRL, 3 mm bilateral, no nystagmus, EOM intact; conjunctiva and sclera clear.  ENT: No nasal discharge; airway clear.  NECK: Supple; non tender.+ full passive ROM in all directions. No JVD  CARD: S1, S2 normal; irregular pulse  RESP: No wheezes, rales or rhonchi. Good air movement bilaterally  ABD: soft; non-distended; non-tender. No Rebound, No Gaurding, No signs of peritnitis, No CVA tenderness  EXT: Normal ROM.  +2 peripheral edema    Plan- labs, ECG, CXR, bedside echo. Will rate ctonrol as patient is in a fib RVR to 150s.

## 2020-04-19 NOTE — ED PROVIDER NOTE - NS ED ROS FT
Eyes:  No visual changes, eye pain or discharge.  ENMT:  No hearing changes, pain, discharge or infections. No neck pain or stiffness.  Cardiac:  No chest pain. + SOB. no edema. No chest pain with exertion.  Respiratory:  No cough or respiratory distress. No hemoptysis. No history of asthma or RAD.  GI:  No nausea, vomiting, diarrhea or abdominal pain.  :  No dysuria, frequency or burning.  MS:  No myalgia, muscle weakness, joint pain or back pain.  Neuro:  No headache or weakness.  No LOC.  Skin:  No skin rash.   Endocrine: No history of thyroid disease or diabetes.

## 2020-04-19 NOTE — ED ADULT NURSE NOTE - OBJECTIVE STATEMENT
Patient c/o SOB x 3 months, patient was placed on ABX and finished course of treatment without relief to symptoms. Patient was found to have b/l pleural effusions from hospital in [place] and placed on Eliquis. Patient states she got increasingly SOB and palpitations this morning. On assessment no signs of resp distress noted- patient placed on 3L NC sating 98%. Patient appears comfortable. Denies n/v/c/d/cp. Patient afebrile at this time.

## 2020-04-19 NOTE — ED PROVIDER NOTE - PHYSICAL EXAMINATION
CONSTITUTIONAL: Well-developed; well-nourished; in no acute distress.   SKIN: warm, dry  HEAD: Normocephalic; atraumatic.  EYES: PERRL, EOMI, normal sclera and conjunctiva   ENT: No nasal discharge; airway clear.  NECK: Supple; non tender.  CARD: S1, S2 normal; no murmurs, gallops, or rubs. irregular, tachycardic   RESP: No wheezes, rales or rhonchi.  ABD: soft ntnd  EXT: Normal ROM.  No clubbing, cyanosis. +1 peripheral edema  LYMPH: No acute cervical adenopathy.  NEURO: Alert, oriented, grossly unremarkable  PSYCH: Cooperative, appropriate.

## 2020-04-19 NOTE — ED PROVIDER NOTE - CARE PLAN
Principal Discharge DX:	SOB (shortness of breath)  Secondary Diagnosis:	Atrial fibrillation with RVR Principal Discharge DX:	SOB (shortness of breath)  Secondary Diagnosis:	Atrial fibrillation with RVR  Secondary Diagnosis:	CHF (congestive heart failure)

## 2020-04-19 NOTE — H&P ADULT - ATTENDING COMMENTS
68 YO F with a PMH of HTN, Afib (apixaban), and hypothyroidism who presents to the hospital with a c/o progressively worsening ROPER for the past x 2 weeks. Associated with intermittent feelings of palpitations. ROS positive for non-productive cough (present since January). Denies any CP, LE swelling, N/V/D, ABD pain, or dysuria. In the ED, pt found to be in Afib with RVR. Given Diltiazem push and placed on drip. Chest X-Ray with pleural effusion and pulm vascular congestion. IV Lasix given in ED. Isolated and COVID19 swab sent.     Physical exam shows pt in NAD. Tachycardic (110's), afebrile, not hypoxic on 3L NC. A&Ox3. Non-focal neuro exam. Muscle strength/sensation intact. Decreased breath sounds over right lung base. Tachycardic and irregular rate and rhythm. ABD is soft and non-tender, normoactive BSs. LEs without swelling. No rashes. Labs and radiology as above.     ROPER and palpitations due to Afib with RVR, currently rate-controlled. New-onset HF? Tele admit. Cardio is following. TSH. IV Lasix given in ED, hold for now. Echo. C/w diltiazem drip and bridge to PO meds. C/w AC.     Mild transaminitis, likely from hepatic congestion. Serial LFTs.     Hx of HTN and hypothyroidism. Restart home meds. GI and DVT PPX. Inform PCP of pt's admission to hospital. Rest as per above note.

## 2020-04-19 NOTE — H&P ADULT - NSHPPHYSICALEXAM_GEN_ALL_CORE
T(F): 98.1 (04-19-20 @ 15:26), Max: 98.1 (04-19-20 @ 15:26)  HR: 110 (04-19-20 @ 19:00) (102 - 148)  BP: 127/84 (04-19-20 @ 19:00) (95/68 - 143/103)  RR: 20 (04-19-20 @ 19:00) (20 - 26)  SpO2: 96% (04-19-20 @ 19:00) (95% - 98%)    General : No distress, alert   Pulmonary : On NC, unlabored breathing, + cough, bibasilar decrease in breath sounds R > L, no crackles or wheezing   Cardiovascular : Irregular S1S2 tachycardic   Abdomen : soft nontender and nondistended   Extremities : No edema

## 2020-04-19 NOTE — ED ADULT TRIAGE NOTE - CHIEF COMPLAINT QUOTE
Pt c/o SOB x 3 months.  Pt having worsening SOB and palpitations today.  Pt just finished antibiotic for pneumonia. Pt tested negative for COVID  April 9.

## 2020-04-19 NOTE — CONSULT NOTE ADULT - ASSESSMENT
-Afib( paroxysmal) with RVR , CHADSVASC 3 ( Age , Sex , HTN) , on bisorpolol and eliquis ( compliant with AC for at least 2 months)  -Subacute decompensated heart failure likely from Afib and RVR  Pericardial effusion : likely subacute , moderate on 2d echo , with no evidence of tamponade physiology( clinically and on bedside 2d echo)                               pt has no acute or recent symptoms or pericarditis ( pervious echo in jan showed no evidence of effusion)  hypothyroidism on synthroid    plan  admit to telemetry  lasix 40mg IV once   repeat CXR in AM , acc i /O , avoid hypovolemia as it can significantly decrease preload in the setting of moderate pericardial effusion  continue with AC for now ( less likely hemorrhagic effusion) given the subacute presentation and pt was already on AC  repeat 2d echo in AM  start cardizem drip for rate control  if HR is poorly controlled with cardizem, will consider amiodarone for pharmacological CV ( since pt is on AC)  c/w bisoprolol and synthroid  check TSH , FT4 , ESR , CRP -Afib( paroxysmal) with RVR , CHADSVASC 3 ( Age , Sex , HTN) , on bisorpolol and eliquis ( compliant with AC for at least 2 months)  -Subacute decompensated heart failure likely from Afib and RVR  Pericardial effusion : likely subacute , moderate on 2d echo , with no evidence of tamponade physiology( clinically and on bedside 2d echo)                               pt has no acute or recent symptoms or pericarditis ( pervious echo in jan showed no evidence of effusion)  hypothyroidism on synthroid    plan  admit to telemetry  lasix 40mg IV once, then 40 bid until euvolemic   repeat CXR in AM , acc i /O , avoid hypovolemia as it can significantly decrease preload in the setting of moderate pericardial effusion  continue with AC for now ( less likely hemorrhagic effusion) given the subacute presentation and pt was already on AC  repeat full 2d echo in AM  D/C Plaquenil unless COVID +, start amio 150 bolus, followed by PO   c/w bisoprolol and synthroid  check TSH , FT4 , ESR , CRP  CT abd/pelvis

## 2020-04-19 NOTE — H&P ADULT - ASSESSMENT
69 year old female patient with past medical history of hypertension, Afib, hypothyroidism presented to the ED for dyspnea     # 69 year old female patient with past medical history of hypertension, Afib, hypothyroidism presented to the ED for dyspnea     # Dyspnea secondary to suspected COVID-19 vs Afib induced pulmonary vascular congestion   SaO2 95 % on room air, chest x ray showing bilateral pleural effusions and possible vascular congestion more pronounced on the right   Follow-up official chest x ray report and SARS-CoV-2 PCR   BNP 2392  O2 supplementation PRN to keep SaO2 between 90 and 92 %   Maintain airborne and contact isolation pending COVID-19 result   Will start on hydroxychloroquine 800 mg once then 400 mg daily for 4 days. Follow-up ECG for QTc (Last ECG on file dating from January 2020 showing QTc 409 ms)   Tylenol 650 mg q6h PRN for fever, Guaifenesin / DM 10 mL q4h PRN for cough, Zofran 4 mg IV q8h PRN for nausea and / or vomiting   Fibrinogen Assay: 411.0 mg/dL (04.19.20 @ 16:20)  Lactate Dehydrogenase, Serum: 361 (04.19.20 @ 16:20)  D-Dimer Assay, Quantitative: 449 ng/mL DDU (04.19.20 @ 16:20)  Follow-up rest of immune hyperactivation panel : ESR, CRP, procalcitonin, Ferritin, Creatine kinase  Consider ID and pulmonary consult if clinical decompensation     # Afib with RVR and resultant pulmonary vascular congestion 69 year old female patient with past medical history of hypertension, Afib, hypothyroidism presented to the ED for dyspnea     # Dyspnea secondary to suspected COVID-19 vs Afib induced pulmonary vascular congestion   SaO2 95 % on room air, chest x ray showing bilateral pleural effusions and possible vascular congestion more pronounced on the right   Follow-up official chest x ray report and SARS-CoV-2 PCR   BNP 2392  O2 supplementation PRN to keep SaO2 between 90 and 92 %   Maintain airborne and contact isolation pending COVID-19 result   Will start on hydroxychloroquine 800 mg once then 400 mg daily for 4 days. Follow-up ECG for QTc (Last ECG on file dating from January 2020 showing QTc 409 ms)   Tylenol 650 mg q6h PRN for fever, Guaifenesin / DM 10 mL q4h PRN for cough, Zofran 4 mg IV q8h PRN for nausea and / or vomiting   Fibrinogen Assay: 411.0 mg/dL (04.19.20 @ 16:20)  Lactate Dehydrogenase, Serum: 361 (04.19.20 @ 16:20)  D-Dimer Assay, Quantitative: 449 ng/mL DDU (04.19.20 @ 16:20)  Follow-up rest of immune hyperactivation panel : ESR, CRP, procalcitonin, Ferritin, Creatine kinase  Consider ID and pulmonary consult if clinical decompensation     # Afib with RVR and resultant pulmonary vascular congestion  Continue Cardizem drip  Monitor on Telemetry   Continue Lasix 40 mg IV daily   Strict Is and os and daily weights with Fluid restriction of 1000 mL/day   last echocardiogram was done in January 2020 showed EF between 60 and 65 % and normal systolic and diastolic function. Will repeat echocardiogram   Trend Troponin (First set negative)   Follow-up TSH and T4   Follow-up cardiology recommendations   Continue Eliquis     # Hypothyroidism   Continue   Follow-up TSH and T4     # HTN   Continue     # Miscellaneous   DVT prophylaxis : Patient on Eliquis   GI prophylaxis : Protonix 40 mg daily   Activity : Ambulate as tolerated   Diet : DASH TLC   Code status : 69 year old female patient with past medical history of hypertension, Afib, hypothyroidism presented to the ED for dyspnea     # Dyspnea secondary to suspected COVID-19 vs Afib induced pulmonary vascular congestion   SaO2 95 % on room air, chest x ray showing bilateral pleural effusions and possible vascular congestion more pronounced on the right   Follow-up official chest x ray report and SARS-CoV-2 PCR   BNP 2392  O2 supplementation PRN to keep SaO2 between 90 and 92 %   Maintain airborne and contact isolation pending COVID-19 result   Will start on hydroxychloroquine 800 mg once then 400 mg daily for 4 days. Follow-up ECG for QTc (Last ECG on file dating from January 2020 showing QTc 409 ms)   Tylenol 650 mg q6h PRN for fever, Guaifenesin / DM 10 mL q4h PRN for cough, Zofran 4 mg IV q8h PRN for nausea and / or vomiting   Fibrinogen Assay: 411.0 mg/dL (04.19.20 @ 16:20)  Lactate Dehydrogenase, Serum: 361 (04.19.20 @ 16:20)  D-Dimer Assay, Quantitative: 449 ng/mL DDU (04.19.20 @ 16:20)  Follow-up rest of immune hyperactivation panel : ESR, CRP, procalcitonin, Ferritin, Creatine kinase  Consider ID and pulmonary consult if clinical decompensation     # Afib with RVR and resultant pulmonary vascular congestion  Continue Cardizem drip  Home dose of Bisoprolol changed to equivalent dose of Lopressor 50 mg BID  Monitor on Telemetry   Continue Lasix 40 mg IV daily   Strict Is and os and daily weights with Fluid restriction of 1000 mL/day   last echocardiogram was done in January 2020 showed EF between 60 and 65 % and normal systolic and diastolic function. Will repeat echocardiogram   Trend Troponin (First set negative)   Follow-up TSH and T4   Follow-up cardiology recommendations   Continue Eliquis 5 mg BID   Follow up repeat chest x ray in AM     # Hypothyroidism   Continue Synthroid 50 mcg daily   Follow-up TSH and T4     # HTN   Off Losartan at home. Consider restarting if BP elevated     # Vitamin D deficiency   Continue Vitamin D2 50 000 units weekly     # Miscellaneous   DVT prophylaxis : Patient on Eliquis   GI prophylaxis : Protonix 40 mg daily   Activity : Ambulate as tolerated   Diet : DASH TLC   Code status : 69 year old female patient with past medical history of hypertension, Afib, hypothyroidism presented to the ED for dyspnea     # Dyspnea secondary to suspected COVID-19 vs Afib induced pulmonary vascular congestion   SaO2 95 % on room air, chest x ray showing bilateral pleural effusions and possible vascular congestion more pronounced on the right   Follow-up official chest x ray report and SARS-CoV-2 PCR   BNP 2392  O2 supplementation PRN to keep SaO2 between 90 and 92 %   Maintain airborne and contact isolation pending COVID-19 result   Will start on hydroxychloroquine 800 mg once then 400 mg daily for 4 days. Follow-up ECG for QTc (Last ECG on file dating from January 2020 showing QTc 409 ms)   Tylenol 650 mg q6h PRN for fever, Guaifenesin / DM 10 mL q4h PRN for cough, Zofran 4 mg IV q8h PRN for nausea and / or vomiting   Fibrinogen Assay: 411.0 mg/dL (04.19.20 @ 16:20)  Lactate Dehydrogenase, Serum: 361 (04.19.20 @ 16:20)  D-Dimer Assay, Quantitative: 449 ng/mL DDU (04.19.20 @ 16:20)  Follow-up rest of immune hyperactivation panel : ESR, CRP, procalcitonin, Ferritin, Creatine kinase  Consider ID and pulmonary consult if clinical decompensation     # Afib with RVR and resultant pulmonary vascular congestion  Continue Cardizem drip  Home dose of Bisoprolol changed to equivalent dose of Lopressor 50 mg BID  Monitor on Telemetry   Continue Lasix 40 mg IV daily   Strict Is and os and daily weights with Fluid restriction of 1000 mL/day   last echocardiogram was done in January 2020 showed EF between 60 and 65 % and normal systolic and diastolic function. Will repeat echocardiogram   Trend Troponin (First set negative)   Follow-up TSH and T4   Follow-up cardiology recommendations   Continue Eliquis 5 mg BID   Follow up repeat chest x ray in AM     # Hypothyroidism   Continue Synthroid 50 mcg daily   Follow-up TSH and T4     # HTN   Off Losartan at home. Consider restarting if BP elevated     # Vitamin D deficiency   Continue Vitamin D2 50 000 units weekly     # Miscellaneous   DVT prophylaxis : Patient on Eliquis   GI prophylaxis : Protonix 40 mg daily   Activity : Ambulate as tolerated   Diet : DASH TLC   Code status : Full code

## 2020-04-19 NOTE — CONSULT NOTE ADULT - SUBJECTIVE AND OBJECTIVE BOX
HPI:    70 yof w/ HTN, hypothyroidism, afib on eliquis and bisoprolol here for sob worsening for 2-3 weeks since finishing treatment of mycoplasma pneumonia with azithromycin in january. pt came in today because she became much more SOB today. pt denies chest pain, abd pain, n/v/d, fevers. pt tested for COVID 1 week ago, negative.    PAST MEDICAL & SURGICAL HISTORY  Hypothyroid  Hypertension  S/P cholecystectomy      FAMILY HISTORY:  FAMILY HISTORY:  FH: heart disease: father has pacemaker      SOCIAL HISTORY:  []smoker  []Alcohol  []Drug    ALLERGIES:  No Known Allergies      MEDICATIONS:  MEDICATIONS  (STANDING):  diltiazem Infusion 5 mG/Hr (5 mL/Hr) IV Continuous <Continuous>    MEDICATIONS  (PRN):      HOME MEDICATIONS:  Home Medications:  levothyroxine 50 mcg (0.05 mg) oral tablet: 1 tab(s) orally once a day (07 Jan 2020 11:35)  losartan 100 mg oral tablet: 1 tab(s) orally once a day (07 Jan 2020 11:35)      VITALS:   T(F): 98.1 (04-19 @ 15:26), Max: 98.1 (04-19 @ 15:26)  HR: 110 (04-19 @ 18:03) (110 - 148)  BP: 133/75 (04-19 @ 18:03) (95/68 - 143/103)  BP(mean): 99 (04-19 @ 18:03) (82 - 99)  RR: 20 (04-19 @ 18:03) (20 - 26)  SpO2: 97% (04-19 @ 18:03) (95% - 98%)    I&O's Summary      REVIEW OF SYSTEMS:  CONSTITUTIONAL: No weakness, fevers or chills  EYES: No visual changes  ENT: No vertigo or throat pain   NECK: No pain or stiffness  RESPIRATORY: No cough, wheezing, hemoptysis; No shortness of breath  CARDIOVASCULAR: No chest pain or palpitations  GASTROINTESTINAL: No abdominal or epigastric pain. No nausea, vomiting, or hematemesis; No diarrhea or constipation. No melena or hematochezia.  GENITOURINARY: No dysuria, frequency or hematuria  NEUROLOGICAL: No numbness or weakness  SKIN: No itching, no rashes  MSK: no    PHYSICAL EXAM:  NEURO: patient is awake , alert and oriented  GEN: Not in acute distress  NECK: no thyroid enlargement, no JVD  LUNGS: Clear to auscultation bilaterally   CARDIOVASCULAR: S1/S2 present, RRR , no murmurs or rubs, no carotid bruits,  + PP bilaterally  ABD: Soft, non-tender, non-distended, +BS  EXT: No HETAL  SKIN: Intact    LABS:                        14.7   11.07 )-----------( 289      ( 19 Apr 2020 16:20 )             43.3     04-19    138  |  105  |  13  ----------------------------<  102<H>  4.3   |  19  |  0.7    Ca    9.6      19 Apr 2020 16:20    TPro  6.3  /  Alb  4.2  /  TBili  0.5  /  DBili  x   /  AST  44<H>  /  ALT  57<H>  /  AlkPhos  63  04-19    PT/INR - ( 19 Apr 2020 16:20 )   PT: 16.40 sec;   INR: 1.43 ratio         PTT - ( 19 Apr 2020 16:20 )  PTT:32.3 sec  Lactate, Blood: 1.4 mmol/L (04-19-20 @ 16:20)  Creatine Kinase, Serum: 135 U/L (04-19-20 @ 16:20)  Troponin T, Serum: <0.01 ng/mL (04-19-20 @ 16:20)    CARDIAC MARKERS ( 19 Apr 2020 16:20 )  x     / <0.01 ng/mL / 135 U/L / x     / x          Serum Pro-Brain Natriuretic Peptide: 2392 pg/mL (04-19-20 @ 16:20)          RADIOLOGY:  -CXR: bilateral opacities and pleural effusion  -TTE: < from: Transthoracic Echocardiogram (01.07.20 @ 14:20) >   1. Left ventricular ejection fraction, by visual estimation, is 60 to 65%.   2. Normal left ventricular size and wall thicknesses, with normal systolic and diastolic function.   3. Mildly enlarged right atrium.   4. LA volume Index is 43.9 ml/m² ml/m2.    < end of copied text >        ECG:  afib with with RVR with microvoltage HPI:    70 yof w/ HTN, hypothyroidism, afib on eliquis and bisoprolol here for sob worsening for 2-3 weeks since finishing treatment of mycoplasma pneumonia with azithromycin in january. pt came in today because she became much more SOB today. pt denies chest pain, abd pain, n/v/d, fevers. pt tested for COVID 1 week ago, negative.    PAST MEDICAL & SURGICAL HISTORY  Hypothyroid  Hypertension  S/P cholecystectomy      FAMILY HISTORY:  FAMILY HISTORY:  FH: heart disease: father has pacemaker      SOCIAL HISTORY:  non smoker  no Alcohol  []Drug    ALLERGIES:  No Known Allergies      MEDICATIONS:  MEDICATIONS  (STANDING):  diltiazem Infusion 5 mG/Hr (5 mL/Hr) IV Continuous <Continuous>    MEDICATIONS  (PRN):      HOME MEDICATIONS:  Home Medications:  levothyroxine 50 mcg (0.05 mg) oral tablet: 1 tab(s) orally once a day (07 Jan 2020 11:35)  losartan 100 mg oral tablet: 1 tab(s) orally once a day (07 Jan 2020 11:35)      VITALS:   T(F): 98.1 (04-19 @ 15:26), Max: 98.1 (04-19 @ 15:26)  HR: 110 (04-19 @ 18:03) (110 - 148)  BP: 133/75 (04-19 @ 18:03) (95/68 - 143/103)  BP(mean): 99 (04-19 @ 18:03) (82 - 99)  RR: 20 (04-19 @ 18:03) (20 - 26)  SpO2: 97% (04-19 @ 18:03) (95% - 98%)    I&O's Summary      REVIEW OF SYSTEMS:  CONSTITUTIONAL: No weakness, fevers or chills  EYES: No visual changes  ENT: No vertigo or throat pain   NECK: No pain or stiffness  RESPIRATORY: No cough, wheezing, hemoptysis; No shortness of breath  CARDIOVASCULAR: No chest pain or palpitations  GASTROINTESTINAL: No abdominal or epigastric pain. No nausea, vomiting, or hematemesis; No diarrhea or constipation. No melena or hematochezia.  GENITOURINARY: No dysuria, frequency or hematuria  NEUROLOGICAL: No numbness or weakness  SKIN: No itching, no rashes  MSK: no    PHYSICAL EXAM:  NEURO: patient is awake , alert and oriented  GEN: Not in acute distress  NECK: no thyroid enlargement, no JVD  LUNGS: Clear to auscultation bilaterally   CARDIOVASCULAR: S1/S2 present, irreg, no rubs, no carotid bruits,  + PP bilaterally  ABD: Soft, non-tender, non-distended, +BS  EXT/MS: No HETAL  SKIN: Intact    LABS:                        14.7   11.07 )-----------( 289      ( 19 Apr 2020 16:20 )             43.3     04-19    138  |  105  |  13  ----------------------------<  102<H>  4.3   |  19  |  0.7    Ca    9.6      19 Apr 2020 16:20    TPro  6.3  /  Alb  4.2  /  TBili  0.5  /  DBili  x   /  AST  44<H>  /  ALT  57<H>  /  AlkPhos  63  04-19    PT/INR - ( 19 Apr 2020 16:20 )   PT: 16.40 sec;   INR: 1.43 ratio         PTT - ( 19 Apr 2020 16:20 )  PTT:32.3 sec  Lactate, Blood: 1.4 mmol/L (04-19-20 @ 16:20)  Creatine Kinase, Serum: 135 U/L (04-19-20 @ 16:20)  Troponin T, Serum: <0.01 ng/mL (04-19-20 @ 16:20)    CARDIAC MARKERS ( 19 Apr 2020 16:20 )  x     / <0.01 ng/mL / 135 U/L / x     / x          Serum Pro-Brain Natriuretic Peptide: 2392 pg/mL (04-19-20 @ 16:20)          RADIOLOGY:  -CXR: bilateral opacities and pleural effusion  -TTE: < from: Transthoracic Echocardiogram (01.07.20 @ 14:20) >   1. Left ventricular ejection fraction, by visual estimation, is 60 to 65%.   2. Normal left ventricular size and wall thicknesses, with normal systolic and diastolic function.   3. Mildly enlarged right atrium.   4. LA volume Index is 43.9 ml/m² ml/m2.    < end of copied text >        ECG:  afib with with RVR with microvoltage

## 2020-04-19 NOTE — ED PROVIDER NOTE - PROGRESS NOTE DETAILS
s/w cardiac fellow. pt not in tamponade, will admit to low risk tele on cardizem drip Bedside echo shows small to moderate likely chronic pericardial effusion. No signs of temponade. B/l pleural effusions and diffuse B-lines concerning for new onset CHF. Troponin neg. Thyroid studies sent. Cardiology consulted. Currently on cardizem drip. Lasix IV given. Admitted to telemetry.

## 2020-04-20 LAB
ALBUMIN SERPL ELPH-MCNC: 4 G/DL — SIGNIFICANT CHANGE UP (ref 3.5–5.2)
ALP SERPL-CCNC: 61 U/L — SIGNIFICANT CHANGE UP (ref 30–115)
ALT FLD-CCNC: 51 U/L — HIGH (ref 0–41)
ANION GAP SERPL CALC-SCNC: 16 MMOL/L — HIGH (ref 7–14)
AST SERPL-CCNC: 37 U/L — SIGNIFICANT CHANGE UP (ref 0–41)
BILIRUB SERPL-MCNC: 0.6 MG/DL — SIGNIFICANT CHANGE UP (ref 0.2–1.2)
BUN SERPL-MCNC: 11 MG/DL — SIGNIFICANT CHANGE UP (ref 10–20)
CALCIUM SERPL-MCNC: 9.6 MG/DL — SIGNIFICANT CHANGE UP (ref 8.5–10.1)
CHLORIDE SERPL-SCNC: 104 MMOL/L — SIGNIFICANT CHANGE UP (ref 98–110)
CO2 SERPL-SCNC: 25 MMOL/L — SIGNIFICANT CHANGE UP (ref 17–32)
CREAT SERPL-MCNC: 0.7 MG/DL — SIGNIFICANT CHANGE UP (ref 0.7–1.5)
CRP SERPL-MCNC: 0.47 MG/DL — HIGH (ref 0–0.4)
FERRITIN SERPL-MCNC: 54 NG/ML — SIGNIFICANT CHANGE UP (ref 15–150)
GLUCOSE SERPL-MCNC: 109 MG/DL — HIGH (ref 70–99)
HCT VFR BLD CALC: 40.9 % — SIGNIFICANT CHANGE UP (ref 37–47)
HGB BLD-MCNC: 13.3 G/DL — SIGNIFICANT CHANGE UP (ref 12–16)
MAGNESIUM SERPL-MCNC: 2 MG/DL — SIGNIFICANT CHANGE UP (ref 1.8–2.4)
MCHC RBC-ENTMCNC: 30 PG — SIGNIFICANT CHANGE UP (ref 27–31)
MCHC RBC-ENTMCNC: 32.5 G/DL — SIGNIFICANT CHANGE UP (ref 32–37)
MCV RBC AUTO: 92.1 FL — SIGNIFICANT CHANGE UP (ref 81–99)
NRBC # BLD: 0 /100 WBCS — SIGNIFICANT CHANGE UP (ref 0–0)
PLATELET # BLD AUTO: 290 K/UL — SIGNIFICANT CHANGE UP (ref 130–400)
POTASSIUM SERPL-MCNC: 3.6 MMOL/L — SIGNIFICANT CHANGE UP (ref 3.5–5)
POTASSIUM SERPL-SCNC: 3.6 MMOL/L — SIGNIFICANT CHANGE UP (ref 3.5–5)
PROCALCITONIN SERPL-MCNC: 0.02 NG/ML — SIGNIFICANT CHANGE UP (ref 0.02–0.1)
PROT SERPL-MCNC: 6 G/DL — SIGNIFICANT CHANGE UP (ref 6–8)
RBC # BLD: 4.44 M/UL — SIGNIFICANT CHANGE UP (ref 4.2–5.4)
RBC # FLD: 13.9 % — SIGNIFICANT CHANGE UP (ref 11.5–14.5)
SARS-COV-2 RNA SPEC QL NAA+PROBE: SIGNIFICANT CHANGE UP
SODIUM SERPL-SCNC: 145 MMOL/L — SIGNIFICANT CHANGE UP (ref 135–146)
T3 SERPL-MCNC: 87 NG/DL — SIGNIFICANT CHANGE UP (ref 80–200)
T4 AB SER-ACNC: 8.5 UG/DL — SIGNIFICANT CHANGE UP (ref 4.6–12)
T4 FREE SERPL-MCNC: 1.7 NG/DL — SIGNIFICANT CHANGE UP (ref 0.9–1.8)
TROPONIN T SERPL-MCNC: <0.01 NG/ML — SIGNIFICANT CHANGE UP
TSH SERPL-MCNC: 2.37 UIU/ML — SIGNIFICANT CHANGE UP (ref 0.27–4.2)
WBC # BLD: 10.24 K/UL — SIGNIFICANT CHANGE UP (ref 4.8–10.8)
WBC # FLD AUTO: 10.24 K/UL — SIGNIFICANT CHANGE UP (ref 4.8–10.8)

## 2020-04-20 PROCEDURE — 99222 1ST HOSP IP/OBS MODERATE 55: CPT

## 2020-04-20 PROCEDURE — 99233 SBSQ HOSP IP/OBS HIGH 50: CPT

## 2020-04-20 PROCEDURE — 71045 X-RAY EXAM CHEST 1 VIEW: CPT | Mod: 26

## 2020-04-20 PROCEDURE — 93010 ELECTROCARDIOGRAM REPORT: CPT

## 2020-04-20 PROCEDURE — 93306 TTE W/DOPPLER COMPLETE: CPT | Mod: 26

## 2020-04-20 RX ORDER — AMIODARONE HYDROCHLORIDE 400 MG/1
1 TABLET ORAL
Qty: 900 | Refills: 0 | Status: DISCONTINUED | OUTPATIENT
Start: 2020-04-20 | End: 2020-04-21

## 2020-04-20 RX ORDER — AMIODARONE HYDROCHLORIDE 400 MG/1
150 TABLET ORAL ONCE
Refills: 0 | Status: COMPLETED | OUTPATIENT
Start: 2020-04-20 | End: 2020-04-20

## 2020-04-20 RX ORDER — FUROSEMIDE 40 MG
40 TABLET ORAL
Refills: 0 | Status: DISCONTINUED | OUTPATIENT
Start: 2020-04-20 | End: 2020-04-24

## 2020-04-20 RX ORDER — AMIODARONE HYDROCHLORIDE 400 MG/1
0.5 TABLET ORAL
Qty: 900 | Refills: 0 | Status: DISCONTINUED | OUTPATIENT
Start: 2020-04-20 | End: 2020-04-21

## 2020-04-20 RX ADMIN — Medication 50 MILLIGRAM(S): at 06:21

## 2020-04-20 RX ADMIN — Medication 50 MILLIGRAM(S): at 18:27

## 2020-04-20 RX ADMIN — AMIODARONE HYDROCHLORIDE 16.7 MG/MIN: 400 TABLET ORAL at 17:00

## 2020-04-20 RX ADMIN — Medication 1 TABLET(S): at 17:30

## 2020-04-20 RX ADMIN — PANTOPRAZOLE SODIUM 40 MILLIGRAM(S): 20 TABLET, DELAYED RELEASE ORAL at 06:23

## 2020-04-20 RX ADMIN — AMIODARONE HYDROCHLORIDE 33.3 MG/MIN: 400 TABLET ORAL at 10:49

## 2020-04-20 RX ADMIN — AMIODARONE HYDROCHLORIDE 150 MILLIGRAM(S): 400 TABLET ORAL at 10:33

## 2020-04-20 RX ADMIN — ERGOCALCIFEROL 50000 UNIT(S): 1.25 CAPSULE ORAL at 18:47

## 2020-04-20 RX ADMIN — Medication 40 MILLIGRAM(S): at 06:23

## 2020-04-20 RX ADMIN — Medication 40 MILLIGRAM(S): at 18:27

## 2020-04-20 RX ADMIN — APIXABAN 5 MILLIGRAM(S): 2.5 TABLET, FILM COATED ORAL at 06:21

## 2020-04-20 RX ADMIN — Medication 50 MICROGRAM(S): at 06:22

## 2020-04-20 NOTE — PROGRESS NOTE ADULT - ASSESSMENT
A 69 years old female presented with worsening sob for the last two weeks. Also C/O palpitations.       1.	Ac. HFrEF  2.	B/L Pleural effusions.   3.	A-Fib with RVR  4.	HTN  5.	Hypothyroidism  6.	H/O recent Atypical PNA S/P treatment  7.	Suspected COVID in fection         PLAN:     ·	Lasix 40 mg ivp q 12h. First dose STAT  ·	Check i's and o's and daily wt  ·	Low salt diet and water restriction to 1.5 L/D  ·	ECHO reviewed. EF 35-40%. Small pericardial effusion.   ·	Repeat CXR in AM  ·	Care D/W the EP. D/C Cardizem. On Amiodarone drip  ·	Cont Metoprolol 50 mg po q 12h.   ·	Check COVID result  ·	D-dimer 449, CRP 0.47, Procal 0.02  ·	CE x 3 are negative  ·	TSH, T3, T4 are normal

## 2020-04-20 NOTE — CONSULT NOTE ADULT - SUBJECTIVE AND OBJECTIVE BOX
Patient is a 70y old  Female who presents with a chief complaint of Dyspnea (2020 18:18)        HPI:  69 year old female patient with past medical history of hypertension, Afib, hypothyroidism presented to the ED for dyspnea   The patient was admitted in January for new onset Afib with atypical pneumonia and since then has been having dry cough. She also reports palpitations that started at the end of March as well as dyspnea on exertion that started 2 weeks ago and has been getting progressively worse. She denies dyspnea at rest as well as orthopnea but states that she does feel better whenever she sleeps on her abdomen. She completed a 10-day course of Doxycycline yesterday. She denies fever / chills, weight changes, sputum production, chest pain, abdominal pain, nausea / vomiting, diarrhea, urine changes. She was tested for COVID- and the results came back negative    In the ED : /min, RR 26, Temp 98.1, /103, Patient was found to be in Afib with RVR and was given 10 mg IV of Cardizem and was then started on Cardizem infusion. Chest x ray was done and showed bilateral pleural effusions and evidence of vascular congestion (Official report pending) and was given 40 mg IV Lasix. SARS-CoV-2 PCR was sent and the patient was admitted for further management of her Afib (2020 18:18)      Electrophysiology:  70y Female    REVIEW OF SYSTEMS    [ ] A ten-point review of systems was otherwise negative except as noted.  [ ] Due to altered mental status/intubation, subjective information were not able to be obtained from the patient. History was obtained, to the extent possible, from review of the chart and collateral sources of information.      PAST MEDICAL & SURGICAL HISTORY:  Vitamin D deficiency  Afib  Hypothyroid  Hypertension  S/P cholecystectomy      Home Medications:  levothyroxine 50 mcg (0.05 mg) oral tablet: 1 tab(s) orally once a day (2020 19:19)  Multiple Vitamins oral tablet: 1 tab(s) orally once a day (2020 19:19)  Vitamin D2 50,000 intl units (1.25 mg) oral capsule: 1 cap(s) orally once a week (2020 19:19)      Allergies:  No Known Allergies      FAMILY HISTORY:  FH: heart disease: father has pacemaker      SOCIAL HISTORY:    CIGARETTES:  ALCOHOL:        PREVIOUS DIAGNOSTIC TESTING:      ECHO  FINDINGS:    STRESS  FINDINGS:    CATHETERIZATION  FINDINGS:    ELECTROPHYSIOLOGY STUDY  FINDINGS:    CAROTID ULTRASOUND:  FINDINGS    VENOUS DUPLEX SCAN:  FINDINGS:    CHEST CT PULMONARY ANGIO with IV Contrast:  FINDINGS:      MEDICATIONS  (STANDING):  apixaban 5 milliGRAM(s) Oral every 12 hours  diltiazem Infusion 5 mG/Hr (5 mL/Hr) IV Continuous <Continuous>  ergocalciferol 30280 Unit(s) Oral every week  furosemide   Injectable 40 milliGRAM(s) IV Push daily  hydroxychloroquine   Oral   hydroxychloroquine 400 milliGRAM(s) Oral every 24 hours  levothyroxine 50 MICROGram(s) Oral daily  metoprolol tartrate 50 milliGRAM(s) Oral two times a day  multivitamin 1 Tablet(s) Oral daily  pantoprazole    Tablet 40 milliGRAM(s) Oral before breakfast    MEDICATIONS  (PRN):  acetaminophen   Tablet .. 650 milliGRAM(s) Oral every 6 hours PRN Temp greater or equal to 38C (100.4F)  guaifenesin/dextromethorphan  Syrup 10 milliLiter(s) Oral every 4 hours PRN Cough      Vital Signs Last 24 Hrs  T(C): 36.7 (2020 15:26), Max: 36.7 (2020 15:26)  T(F): 98.1 (2020 15:26), Max: 98.1 (2020 15:26)  HR: 96 (2020 06:09) (66 - 148)  BP: 131/78 (2020 06:09) (85/53 - 143/103)  BP(mean): 84 (2020 21:06) (78 - 101)  RR: 18 (2020 06:09) (18 - 26)  SpO2: 98% (2020 06:09) (92% - 98%)    PHYSICAL EXAM:    GENERAL: In no apparent distress, well nourished, and hydrated.  HEAD:  Atraumatic, Normocephalic  EYES: EOMI, PERRLA, conjunctiva and sclera clear  NECK: Supple and normal thyroid.  No JVD or carotid bruit.  Carotid pulse is 2+ bilaterally.  HEART: Regular rate and rhythm; No murmurs, rubs, or gallops.  PULMONARY: Clear to auscultation and perfusion.  No rales, wheezing, or rhonchi bilaterally.  ABDOMEN: Soft, Nontender, Nondistended; Bowel sounds present  EXTREMITIES:  2+ Peripheral Pulses, No clubbing, cyanosis, or edema  NEUROLOGICAL: Grossly nonfocal      INTERPRETATION OF TELEMETRY:    EC Lead ECG:   Ventricular Rate 117 BPM    Atrial Rate 56 BPM    QRS Duration 88 ms    Q-T Interval 380 ms    QTC Calculation(Bezet) 530 ms    R Axis -9 degrees    T Axis 236 degrees    Diagnosis Line Atrial fibrillation with rapid ventricular response  Low voltage QRS  ST & T wave abnormality, consider lateral ischemia  Abnormal ECG    Confirmed by Krzysztof Cadet ([id number]) on 2020 6:20:02 AM (20 @ 05:23)  12 Lead ECG:   Ventricular Rate 138 BPM    Atrial Rate 147 BPM    QRS Duration 80 ms    Q-T Interval 264 ms    QTC Calculation(Bezet) 399 ms    R Axis -24 degrees    T Axis -80 degrees    Diagnosis Line Atrial fibrillation with rapid ventricular response  Low voltage QRS  Nonspecific T wave abnormality  Abnormal ECG    Confirmed by Krzysztof Cadet ([id number]) on 2020 9:57:29 PM (20 @ 15:34)      I&O's Detail    2020 07:01  -  2020 09:24  --------------------------------------------------------  IN:    Oral Fluid: 330 mL  Total IN: 330 mL    OUT:  Total OUT: 0 mL    Total NET: 330 mL          LABS:                        13.3   10.24 )-----------( 290      ( 2020 05:20 )             40.9     20    145  |  104  |  11  ----------------------------<  109<H>  3.6   |  25  |  0.7    Ca    9.6      2020 05:20  Mg     2.0         TPro  6.0  /  Alb  4.0  /  TBili  0.6  /  DBili  x   /  AST  37  /  ALT  51<H>  /  AlkPhos  61  20    CARDIAC MARKERS ( 2020 05:20 )  x     / <0.01 ng/mL / x     / x     / x      CARDIAC MARKERS ( 2020 20:00 )  x     / <0.01 ng/mL / 141 U/L / x     / x      CARDIAC MARKERS ( 2020 16:20 )  x     / <0.01 ng/mL / 135 U/L / x     / x          PT/INR - ( 2020 16:20 )   PT: 16.40 sec;   INR: 1.43 ratio         PTT - ( 2020 16:20 )  PTT:32.3 sec    BNPSerum Pro-Brain Natriuretic Peptide: 2392 pg/mL ( @ 16:20)        I&O's Detail    2020 07:01  -  2020 09:24  --------------------------------------------------------  IN:    Oral Fluid: 330 mL  Total IN: 330 mL    OUT:  Total OUT: 0 mL    Total NET: 330 mL        Daily     Daily     RADIOLOGY & ADDITIONAL STUDIES: Patient is a 70y old  Female who presents with a chief complaint of Dyspnea (2020 18:18)        HPI:  69 year old female patient with past medical history of hypertension, Afib, hypothyroidism presented to the ED for dyspnea   The patient was admitted in January for new onset Afib with atypical pneumonia and since then has been having dry cough. She also reports palpitations that started at the end of March as well as dyspnea on exertion that started 2 weeks ago and has been getting progressively worse. She denies dyspnea at rest as well as orthopnea but states that she does feel better whenever she sleeps on her abdomen. She completed a 10-day course of Doxycycline yesterday. She denies fever / chills, weight changes, sputum production, chest pain, abdominal pain, nausea / vomiting, diarrhea, urine changes. She was tested for COVID-19  and the results came back negative    In the ED : /min, RR 26, Temp 98.1, /103, Patient was found to be in Afib with RVR and was given 10 mg IV of Cardizem and was then started on Cardizem infusion. Chest x ray was done and showed bilateral pleural effusions and evidence of vascular congestion (Official report pending) and was given 40 mg IV Lasix. SARS-CoV-2 PCR was sent and the patient was admitted for further management of her Afib (2020 18:18)      Electrophysiology:  70y Female with h/o HTN, hypothyroidism, PAF, on Eliquis, diagnosed 2020 in the setting of mycoplasma PNA at that time, was discharged on Metoprolol and Cardizem at that time, patient now re-admitted with progressively worsening SOB. Reports that cough never completely went away, she completed 2 courses of Abx as out patient. Over last 2 weeks started to develop worsening dyspnea, cough and palpitations. In ED was found to be in AF -140s bpm, CXR revealed bilateral pleural effusion and vascular congestion, CT scan revealed dilated pulmonary vessels. Patient was tested for COVID-19 on  was negative at that time.  Patient was treated with Cardizem 10mg IVP and started on Cardizem drip @15->10->5mg/hr with good response HR 80-110bpm    < from: CT Chest No Cont (20 @ 18:34) >  IMPRESSION:  1.  Cardiomegaly with bilateral pleural effusions and small pericardial effusion.  2.  Mildly dilated main pulmonary artery, consistent with pulmonary artery hypertension.  < end of copied text >    < from: Xray Chest 1 View-PORTABLE IMMEDIATE (20 @ 16:28) >  Findings:  Support devices: None.  Cardiac/mediastinum/hilum: Obscured  Lung parenchyma/Pleura: Bilateral moderate pleural effusions without definite pneumothorax.  Skeleton/soft tissues: Mild degenerative changes  Impression:  Bilateral pleural effusions without definite pneumothorax.  Underlying etiology is uncertain. Please correlate with concurrent CT  < end of copied text >      REVIEW OF SYSTEMS    [x ] A ten-point review of systems was otherwise negative except as noted.      PAST MEDICAL & SURGICAL HISTORY:  Vitamin D deficiency  Afib  Hypothyroid  Hypertension  S/P cholecystectomy      Home Medications:  levothyroxine 50 mcg (0.05 mg) oral tablet: 1 tab(s) orally once a day (2020 19:19)  Multiple Vitamins oral tablet: 1 tab(s) orally once a day (2020 19:19)  Vitamin D2 50,000 intl units (1.25 mg) oral capsule: 1 cap(s) orally once a week (2020 19:19)  Norvasc 2.5mg daily  Crestor 10mg daily  Bisoprolol 5mg daily  Losartan 100mg daily   Eliquis 5mg bid        Allergies:  No Known Allergies      FAMILY HISTORY:  FH: heart disease: father has pacemaker      SOCIAL HISTORY: denies tobacco / ETOH / illicit drug use    CIGARETTES:  ALCOHOL:        PREVIOUS DIAGNOSTIC TESTING:      ECHO  FINDINGS:  < from: Transthoracic Echocardiogram (20 @ 14:20) >  Summary:   1. Left ventricular ejection fraction, by visual estimation, is 60 to 65%.   2. Normal left ventricular size and wall thicknesses, with normal systolic and diastolic function.   3. Mildly enlarged right atrium.   4. LA volume Index is 43.9 ml/m² ml/m2.  PHYSICIAN INTERPRETATION:  Left Ventricle: Normal left ventricular size and wall thicknesses, with normal systolic and diastolic function. Left ventricular ejection fraction, by visualestimation, is 60 to 65%.    < end of copied text >          MEDICATIONS  (STANDING):  apixaban 5 milliGRAM(s) Oral every 12 hours  diltiazem Infusion 5 mG/Hr (5 mL/Hr) IV Continuous <Continuous>  ergocalciferol 77469 Unit(s) Oral every week  furosemide   Injectable 40 milliGRAM(s) IV Push daily  hydroxychloroquine   Oral   hydroxychloroquine 400 milliGRAM(s) Oral every 24 hours  levothyroxine 50 MICROGram(s) Oral daily  metoprolol tartrate 50 milliGRAM(s) Oral two times a day  multivitamin 1 Tablet(s) Oral daily  pantoprazole    Tablet 40 milliGRAM(s) Oral before breakfast    MEDICATIONS  (PRN):  acetaminophen   Tablet .. 650 milliGRAM(s) Oral every 6 hours PRN Temp greater or equal to 38C (100.4F)  guaifenesin/dextromethorphan  Syrup 10 milliLiter(s) Oral every 4 hours PRN Cough      Vital Signs Last 24 Hrs  T(C): 36.7 (2020 15:26), Max: 36.7 (2020 15:26)  T(F): 98.1 (2020 15:26), Max: 98.1 (2020 15:26)  HR: 96 (2020 06:09) (66 - 148)  BP: 131/78 (2020 06:09) (85/53 - 143/103)  BP(mean): 84 (2020 21:06) (78 - 101)  RR: 18 (2020 06:09) (18 - 26)  SpO2: 98% (2020 06:09) (92% - 98%)    PHYSICAL EXAM:    GENERAL: appears dispnic In no apparent distress, well nourished, and hydrated.  HEAD:  Atraumatic, Normocephalic  EYES: EOMI, PERRLA, conjunctiva and sclera clear  NECK: Supple and normal thyroid.  No JVD or carotid bruit.  Carotid pulse is 2+ bilaterally.  HEART: Regular rate and rhythm; No murmurs, rubs, or gallops.  PULMONARY: Clear to auscultation and perfusion.  No rales, wheezing, or rhonchi bilaterally.  ABDOMEN: Soft, Nontender, Nondistended; Bowel sounds present  EXTREMITIES:  2+ Peripheral Pulses, No clubbing, cyanosis, or edema  NEUROLOGICAL: Grossly nonfocal      INTERPRETATION OF TELEMETRY:    EC Lead ECG:   Ventricular Rate 117 BPM    Atrial Rate 56 BPM    QRS Duration 88 ms    Q-T Interval 380 ms    QTC Calculation(Bezet) 530 ms    R Axis -9 degrees    T Axis 236 degrees    Diagnosis Line Atrial fibrillation with rapid ventricular response  Low voltage QRS  ST & T wave abnormality, consider lateral ischemia  Abnormal ECG    Confirmed by Krzysztof Cadet ([id number]) on 2020 6:20:02 AM (20 @ 05:23)  12 Lead ECG:   Ventricular Rate 138 BPM    Atrial Rate 147 BPM    QRS Duration 80 ms    Q-T Interval 264 ms    QTC Calculation(Bezdean) 399 ms    R Axis -24 degrees    T Axis -80 degrees    Diagnosis Line Atrial fibrillation with rapid ventricular response  Low voltage QRS  Nonspecific T wave abnormality  Abnormal ECG    Confirmed by Krzysztof Cadet ([id number]) on 2020 9:57:29 PM (20 @ 15:34)      I&O's Detail    2020 07:01  -  2020 09:24  --------------------------------------------------------  IN:    Oral Fluid: 330 mL  Total IN: 330 mL    OUT:  Total OUT: 0 mL    Total NET: 330 mL          LABS:                        13.3   10.24 )-----------( 290      ( 2020 05:20 )             40.9     04-20    145  |  104  |  11  ----------------------------<  109<H>  3.6   |  25  |  0.7    Ca    9.6      2020 05:20  Mg     2.0     20    TPro  6.0  /  Alb  4.0  /  TBili  0.6  /  DBili  x   /  AST  37  /  ALT  51<H>  /  AlkPhos  61  04-20    CARDIAC MARKERS ( 2020 05:20 )  x     / <0.01 ng/mL / x     / x     / x      CARDIAC MARKERS ( 2020 20:00 )  x     / <0.01 ng/mL / 141 U/L / x     / x      CARDIAC MARKERS ( 2020 16:20 )  x     / <0.01 ng/mL / 135 U/L / x     / x          PT/INR - ( 2020 16:20 )   PT: 16.40 sec;   INR: 1.43 ratio         PTT - ( 2020 16:20 )  PTT:32.3 sec    BNPSerum Pro-Brain Natriuretic Peptide: 2392 pg/mL ( @ 16:20)        I&O's Detail    2020 07:01  -  2020 09:24  --------------------------------------------------------  IN:    Oral Fluid: 330 mL  Total IN: 330 mL    OUT:  Total OUT: 0 mL    Total NET: 330 mL        Daily     Daily     RADIOLOGY & ADDITIONAL STUDIES: Patient is a 70y old  Female who presents with a chief complaint of Dyspnea (2020 18:18)        HPI:  69 year old female patient with past medical history of hypertension, Afib, hypothyroidism presented to the ED for dyspnea   The patient was admitted in January for new onset Afib with atypical pneumonia and since then has been having dry cough. She also reports palpitations that started at the end of March as well as dyspnea on exertion that started 2 weeks ago and has been getting progressively worse. She denies dyspnea at rest as well as orthopnea but states that she does feel better whenever she sleeps on her abdomen. She completed a 10-day course of Doxycycline yesterday. She denies fever / chills, weight changes, sputum production, chest pain, abdominal pain, nausea / vomiting, diarrhea, urine changes. She was tested for COVID-19  and the results came back negative    In the ED : /min, RR 26, Temp 98.1, /103, Patient was found to be in Afib with RVR and was given 10 mg IV of Cardizem and was then started on Cardizem infusion. Chest x ray was done and showed bilateral pleural effusions and evidence of vascular congestion (Official report pending) and was given 40 mg IV Lasix. SARS-CoV-2 PCR was sent and the patient was admitted for further management of her Afib (2020 18:18)      Electrophysiology:  70y Female with h/o HTN, hypothyroidism, PAF, on Eliquis, diagnosed 2020 in the setting of mycoplasma PNA at that time, was discharged on Metoprolol and Cardizem at that time, patient now re-admitted with progressively worsening SOB. Reports that cough never completely went away, she completed 2 courses of Abx as out patient. Over last 2 weeks started to develop worsening dyspnea, cough and palpitations. In ED was found to be in AF -140s bpm, CXR revealed bilateral pleural effusion and vascular congestion, CT scan revealed dilated pulmonary vessels. Patient was tested for COVID-19 on  was negative at that time.  Patient was treated with Cardizem 10mg IVP and started on Cardizem drip @15->10->5mg/hr with good response HR 80-110bpm    < from: CT Chest No Cont (20 @ 18:34) >  IMPRESSION:  1.  Cardiomegaly with bilateral pleural effusions and small pericardial effusion.  2.  Mildly dilated main pulmonary artery, consistent with pulmonary artery hypertension.  < end of copied text >    < from: Xray Chest 1 View-PORTABLE IMMEDIATE (20 @ 16:28) >  Findings:  Support devices: None.  Cardiac/mediastinum/hilum: Obscured  Lung parenchyma/Pleura: Bilateral moderate pleural effusions without definite pneumothorax.  Skeleton/soft tissues: Mild degenerative changes  Impression:  Bilateral pleural effusions without definite pneumothorax.  Underlying etiology is uncertain. Please correlate with concurrent CT  < end of copied text >      REVIEW OF SYSTEMS    [x ] A ten-point review of systems was otherwise negative except as noted.      PAST MEDICAL & SURGICAL HISTORY:  Vitamin D deficiency  Afib  Hypothyroid  Hypertension  S/P cholecystectomy      Home Medications:  levothyroxine 50 mcg (0.05 mg) oral tablet: 1 tab(s) orally once a day (2020 19:19)  Multiple Vitamins oral tablet: 1 tab(s) orally once a day (2020 19:19)  Vitamin D2 50,000 intl units (1.25 mg) oral capsule: 1 cap(s) orally once a week (2020 19:19)  Norvasc 2.5mg daily  Crestor 10mg daily  Bisoprolol 5mg daily  Losartan 100mg daily   Eliquis 5mg bid        Allergies:  No Known Allergies      FAMILY HISTORY:  FH: heart disease: father has pacemaker      SOCIAL HISTORY: denies tobacco / ETOH / illicit drug use    CIGARETTES:  ALCOHOL:        PREVIOUS DIAGNOSTIC TESTING:      ECHO  FINDINGS:  < from: Transthoracic Echocardiogram (20 @ 14:20) >  Summary:   1. Left ventricular ejection fraction, by visual estimation, is 60 to 65%.   2. Normal left ventricular size and wall thicknesses, with normal systolic and diastolic function.   3. Mildly enlarged right atrium.   4. LA volume Index is 43.9 ml/m² ml/m2.  PHYSICIAN INTERPRETATION:  Left Ventricle: Normal left ventricular size and wall thicknesses, with normal systolic and diastolic function. Left ventricular ejection fraction, by visualestimation, is 60 to 65%.    < end of copied text >          MEDICATIONS  (STANDING):  apixaban 5 milliGRAM(s) Oral every 12 hours  diltiazem Infusion 5 mG/Hr (5 mL/Hr) IV Continuous <Continuous>  ergocalciferol 40126 Unit(s) Oral every week  furosemide   Injectable 40 milliGRAM(s) IV Push daily  hydroxychloroquine   Oral   hydroxychloroquine 400 milliGRAM(s) Oral every 24 hours  levothyroxine 50 MICROGram(s) Oral daily  metoprolol tartrate 50 milliGRAM(s) Oral two times a day  multivitamin 1 Tablet(s) Oral daily  pantoprazole    Tablet 40 milliGRAM(s) Oral before breakfast    MEDICATIONS  (PRN):  acetaminophen   Tablet .. 650 milliGRAM(s) Oral every 6 hours PRN Temp greater or equal to 38C (100.4F)  guaifenesin/dextromethorphan  Syrup 10 milliLiter(s) Oral every 4 hours PRN Cough      Vital Signs Last 24 Hrs  T(C): 36.7 (2020 15:26), Max: 36.7 (2020 15:26)  T(F): 98.1 (2020 15:26), Max: 98.1 (2020 15:26)  HR: 96 (2020 06:09) (66 - 148)  BP: 131/78 (2020 06:09) (85/53 - 143/103)  BP(mean): 84 (2020 21:06) (78 - 101)  RR: 18 (2020 06:09) (18 - 26)  SpO2: 98% (2020 06:09) (92% - 98%)    PHYSICAL EXAM:    GENERAL: appears dyspnic while talking, well nourished, and hydrated.  HEAD:  Atraumatic, Normocephalic  EYES: EOMI, PERRLA, conjunctiva and sclera clear  NECK: Supple and normal thyroid.  No JVD or carotid bruit.  Carotid pulse is 2+ bilaterally.  HEART: IRRegular tachycardic; No murmurs, rubs, or gallops.  PULMONARY: diffuse rhonchi, bilateral rales, diminished breath sounds at the bases.   ABDOMEN: Soft, Nontender, Nondistended; Bowel sounds present  EXTREMITIES:  2+ Peripheral Pulses, No clubbing, cyanosis, or 1+ edema  NEUROLOGICAL: Grossly nonfocal      INTERPRETATION OF TELEMETRY:    EC Lead ECG:   Ventricular Rate 117 BPM    Atrial Rate 56 BPM    QRS Duration 88 ms    Q-T Interval 380 ms    QTC Calculation(Bezet) 530 ms    R Axis -9 degrees    T Axis 236 degrees    Diagnosis Line Atrial fibrillation with rapid ventricular response  Low voltage QRS  ST & T wave abnormality, consider lateral ischemia  Abnormal ECG    Confirmed by Krzysztof Cadet ([id number]) on 2020 6:20:02 AM (20 @ 05:23)  12 Lead ECG:   Ventricular Rate 138 BPM    Atrial Rate 147 BPM    QRS Duration 80 ms    Q-T Interval 264 ms    QTC Calculation(Bezet) 399 ms    R Axis -24 degrees    T Axis -80 degrees    Diagnosis Line Atrial fibrillation with rapid ventricular response  Low voltage QRS  Nonspecific T wave abnormality  Abnormal ECG    Confirmed by Krzysztof Cadet ([id number]) on 2020 9:57:29 PM (20 @ 15:34)      I&O's Detail    2020 07:01  -  2020 09:24  --------------------------------------------------------  IN:    Oral Fluid: 330 mL  Total IN: 330 mL    OUT:  Total OUT: 0 mL    Total NET: 330 mL          LABS:                        13.3   10.24 )-----------( 290      ( 2020 05:20 )             40.9     04-20    145  |  104  |  11  ----------------------------<  109<H>  3.6   |  25  |  0.7    Ca    9.6      2020 05:20  Mg     2.0     20    TPro  6.0  /  Alb  4.0  /  TBili  0.6  /  DBili  x   /  AST  37  /  ALT  51<H>  /  AlkPhos  61  04-20    CARDIAC MARKERS ( 2020 05:20 )  x     / <0.01 ng/mL / x     / x     / x      CARDIAC MARKERS ( 2020 20:00 )  x     / <0.01 ng/mL / 141 U/L / x     / x      CARDIAC MARKERS ( 2020 16:20 )  x     / <0.01 ng/mL / 135 U/L / x     / x          PT/INR - ( 2020 16:20 )   PT: 16.40 sec;   INR: 1.43 ratio         PTT - ( 2020 16:20 )  PTT:32.3 sec    BNPSerum Pro-Brain Natriuretic Peptide: 2392 pg/mL ( @ 16:20)        I&O's Detail    2020 07:01  -  2020 09:24  --------------------------------------------------------  IN:    Oral Fluid: 330 mL  Total IN: 330 mL    OUT:  Total OUT: 0 mL    Total NET: 330 mL        Daily     Daily     RADIOLOGY & ADDITIONAL STUDIES:

## 2020-04-20 NOTE — PROGRESS NOTE ADULT - ASSESSMENT
69 year old female patient with past medical history of hypertension, Afib, hypothyroidism presented to the ED for dyspnea     # Dyspnea secondary to CHF vs suspected COVID-19   SaO2 98 % on 2 liters nasal cannula, chest x ray showing bilateral pleural effusions and possible vascular congestion more pronounced on the right   BNP 2392 trop negative x3  Lasix increased to 40mg Iv BID  Chest CT bilateral effusions with moderate pericardial effusion  O2 supplementation PRN to keep SaO2 between 90 and 92 %   Pulm consulted for possible thoracentesis will hold eliquis for possible tap in am   -repeat xray in am    #Covid pending  Maintain airborne and contact isolation pending COVID-19 result D/chantell HCQ per cardio if comes back positive will reconsider  Fibrinogen Assay: 411.0 mg/dL (04.19.20 @ 16:20)  Lactate Dehydrogenase, Serum: 361 (04.19.20 @ 16:20)  D-Dimer Assay, Quantitative: 449 ng/mL DDU (04.19.20 @ 16:20)  -rest of immune hyperactivation panel : ESR 4, CRP 0.47, procalcitonin 0.02, Ferritin 54, Creatine kinase 141    # Afib with RVR and resultant pulmonary vascular congestion  Per Ep c/w amio drip ( was on cardizem drip) - will rate control for now while having work up for effusions and later possibly cardiovert  Home dose of Bisoprolol changed to equivalent dose of Lopressor 50 mg BID  Monitor on Telemetry   Continue Lasix 40 mg Iv BID   Strict Is and os and daily weights with Fluid restriction of 1000 mL/day   -Official echo ordered EF 35-40% 4-20  -last echocardiogram was done in January 2020 showed EF between 60 and 65 %   holding Eliquis 5 mg BID as above for possible tap    # Hypothyroidism   Continue Synthroid 50 mcg daily    TSH and T4 wnl    # HTN   Off Losartan at home. Consider restarting if BP elevated     # Vitamin D deficiency   Continue Vitamin D2 50 000 units weekly     # Miscellaneous   DVT prophylaxis : Patient on Eliquis holding tonight  GI prophylaxis : Protonix 40 mg daily   Activity : Ambulate as tolerated   Diet : DASH TLC   Code status : Full code

## 2020-04-20 NOTE — CONSULT NOTE ADULT - SUBJECTIVE AND OBJECTIVE BOX
Patient is a 70y old  Female who presents with a chief complaint of Dyspnea (20 Apr 2020 09:24)      HPI:  69 year old female patient with past medical history of hypertension, Afib, hypothyroidism presented to the ED for dyspnea   The patient was admitted in January for new onset Afib with atypical pneumonia and since then has been having dry cough. She also reports palpitations that started at the end of March as well as dyspnea on exertion that started 2 weeks ago and has been getting progressively worse. She denies dyspnea at rest as well as orthopnea but states that she does feel better whenever she sleeps on her abdomen. She completed a 10-day course of Doxycycline yesterday. She denies fever / chills, weight changes, sputum production, chest pain, abdominal pain, nausea / vomiting, diarrhea, urine changes. She was tested for COVID-19 April 9th and the results came back negative    In the ED : /min, RR 26, Temp 98.1, /103, Patient was found to be in Afib with RVR and was given 10 mg IV of Cardizem and was then started on Cardizem infusion. Chest x ray was done and showed bilateral pleural effusions and evidence of vascular congestion (Official report pending) and was given 40 mg IV Lasix. SARS-CoV-2 PCR was sent and the patient was admitted for further management of her Afib (19 Apr 2020 18:18)      PAST MEDICAL & SURGICAL HISTORY:  Vitamin D deficiency  Afib  Hypothyroid  Hypertension  S/P cholecystectomy      SOCIAL HX:   nonsmoker, no etoh abuse    FAMILY HISTORY:  FH: heart disease: father has pacemaker  .  No cardiovascular or pulmonary family history     Review of System:  See HPI    Allergies    No Known Allergies    Intolerances          PHYSICAL EXAM  Vital Signs Last 24 Hrs  T(C): 36.7 (19 Apr 2020 15:26), Max: 36.7 (19 Apr 2020 15:26)  T(F): 98.1 (19 Apr 2020 15:26), Max: 98.1 (19 Apr 2020 15:26)  HR: 96 (20 Apr 2020 06:09) (66 - 148)  BP: 131/78 (20 Apr 2020 06:09) (85/53 - 143/103)  BP(mean): 84 (19 Apr 2020 21:06) (78 - 101)  RR: 18 (20 Apr 2020 06:09) (18 - 26)  SpO2: 98% (20 Apr 2020 06:09) (92% - 98%) 2L NC    General: In NAD  HEENT: DANILO             Lymphatic system: No cervical LN     Lungs: decreased bibasilar breath sounds  Cardiovascular: Regular  Gastrointestinal: Soft.  + BS   Musculoskeletal: No Clubbing.  Full range of motion.. Moves all extremities  Skin: Warm.  Intact  Neurological: No motor or sensory deficit       LABS:                          13.3   10.24 )-----------( 290      ( 20 Apr 2020 05:20 )             40.9                                               04-20    145  |  104  |  11  ----------------------------<  109<H>  3.6   |  25  |  0.7    Ca    9.6      20 Apr 2020 05:20  Mg     2.0     04-20    TPro  6.0  /  Alb  4.0  /  TBili  0.6  /  DBili  x   /  AST  37  /  ALT  51<H>  /  AlkPhos  61  04-20      PT/INR - ( 19 Apr 2020 16:20 )   PT: 16.40 sec;   INR: 1.43 ratio         PTT - ( 19 Apr 2020 16:20 )  PTT:32.3 sec                                           CARDIAC MARKERS ( 20 Apr 2020 05:20 )  x     / <0.01 ng/mL / x     / x     / x      CARDIAC MARKERS ( 19 Apr 2020 20:00 )  x     / <0.01 ng/mL / 141 U/L / x     / x      CARDIAC MARKERS ( 19 Apr 2020 16:20 )  x     / <0.01 ng/mL / 135 U/L / x     / x                                                LIVER FUNCTIONS - ( 20 Apr 2020 05:20 )  Alb: 4.0 g/dL / Pro: 6.0 g/dL / ALK PHOS: 61 U/L / ALT: 51 U/L / AST: 37 U/L / GGT: x                                                                                                MEDICATIONS  (STANDING):  aMIOdarone Infusion 1 mG/Min (33.3 mL/Hr) IV Continuous <Continuous>  aMIOdarone Infusion 0.5 mG/Min (16.7 mL/Hr) IV Continuous <Continuous>  apixaban 5 milliGRAM(s) Oral every 12 hours  ergocalciferol 47691 Unit(s) Oral every week  furosemide   Injectable 40 milliGRAM(s) IV Push two times a day  levothyroxine 50 MICROGram(s) Oral daily  metoprolol tartrate 50 milliGRAM(s) Oral two times a day  multivitamin 1 Tablet(s) Oral daily  pantoprazole    Tablet 40 milliGRAM(s) Oral before breakfast    MEDICATIONS  (PRN):  acetaminophen   Tablet .. 650 milliGRAM(s) Oral every 6 hours PRN Temp greater or equal to 38C (100.4F)  guaifenesin/dextromethorphan  Syrup 10 milliLiter(s) Oral every 4 hours PRN Cough

## 2020-04-20 NOTE — PROGRESS NOTE ADULT - SUBJECTIVE AND OBJECTIVE BOX
ALVARO OLIVERA  70y Female    CHIEF COMPLAINT:    Patient is a 70y old  Female who presents with a chief complaint of Dyspnea (20 Apr 2020 13:12)      INTERVAL HPI/OVERNIGHT EVENTS:    Patient seen and examined. C/O sob and palpitations. No fever. No cough.     ROS: All other systems are negative.    Vital Signs:    T(F): 97.8 (04-20-20 @ 12:57), Max: 97.8 (04-20-20 @ 12:57)  HR: 119 (04-20-20 @ 12:57) (66 - 148)  BP: 98/75 (04-20-20 @ 12:57) (85/53 - 143/103)  RR: 18 (04-20-20 @ 12:57) (18 - 22)  SpO2: 99% (04-20-20 @ 12:57) (92% - 99%)  I&O's Summary    20 Apr 2020 07:01  -  20 Apr 2020 15:39  --------------------------------------------------------  IN: 570 mL / OUT: 0 mL / NET: 570 mL      Daily     Daily   CAPILLARY BLOOD GLUCOSE          PHYSICAL EXAM:    GENERAL:  NAD  SKIN: No rashes or lesions  HENT: Atraumatic Normocephalic. PERRL. Moist membranes.  NECK: Supple, No JVD. No lymphadenopathy.  PULMONARY: BS are decreased in the lower chest post B/L. No wheezing. No rales  CVS: Normal S1, S2. Rate and Rhythm are IRIR. No murmurs.  ABDOMEN/GI: Soft, Nontender, Nondistended; BS present  EXTREMITIES: Peripheral pulses intact. No edema B/L LE.  NEUROLOGIC:  No motor or sensory deficit.  PSYCH: Alert & oriented x 3    Consultant(s) Notes Reviewed:  [x ] YES  [ ] NO  Care Discussed with Consultants/Other Providers [ x] YES  [ ] NO    EKG reviewed  Telemetry reviewed    LABS:                        13.3   10.24 )-----------( 290      ( 20 Apr 2020 05:20 )             40.9     04-20    145  |  104  |  11  ----------------------------<  109<H>  3.6   |  25  |  0.7    Ca    9.6      20 Apr 2020 05:20  Mg     2.0     04-20    TPro  6.0  /  Alb  4.0  /  TBili  0.6  /  DBili  x   /  AST  37  /  ALT  51<H>  /  AlkPhos  61  04-20    PT/INR - ( 19 Apr 2020 16:20 )   PT: 16.40 sec;   INR: 1.43 ratio         PTT - ( 19 Apr 2020 16:20 )  PTT:32.3 sec  Serum Pro-Brain Natriuretic Peptide: 2392 pg/mL (04-19-20 @ 16:20)    Trop <0.01, CKMB --, CK --, 04-20-20 @ 05:20  Trop <0.01, CKMB --, , 04-19-20 @ 20:00  Trop <0.01, CKMB --, , 04-19-20 @ 16:20        RADIOLOGY & ADDITIONAL TESTS:    < from: Transthoracic Echocardiogram (04.20.20 @ 10:09) >    Summary:   1. Left ventricular ejection fraction, by visual estimation, is 35 to 40%.   2. Grossly moderately decreased global left ventricular systolic function.   3. LA volume Index is 41.0 ml/m² ml/m2.   4. Small pericardial effusion.    < end of copied text >    Imaging or report Personally Reviewed:  [ ] YES  [ ] NO    Medications:  Standing  aMIOdarone Infusion 1 mG/Min IV Continuous <Continuous>  aMIOdarone Infusion 0.5 mG/Min IV Continuous <Continuous>  ergocalciferol 12517 Unit(s) Oral every week  furosemide   Injectable 40 milliGRAM(s) IV Push two times a day  levothyroxine 50 MICROGram(s) Oral daily  metoprolol tartrate 50 milliGRAM(s) Oral two times a day  multivitamin 1 Tablet(s) Oral daily  pantoprazole    Tablet 40 milliGRAM(s) Oral before breakfast    PRN Meds  acetaminophen   Tablet .. 650 milliGRAM(s) Oral every 6 hours PRN  guaifenesin/dextromethorphan  Syrup 10 milliLiter(s) Oral every 4 hours PRN      Case discussed with resident    Care discussed with pt/family

## 2020-04-20 NOTE — CONSULT NOTE ADULT - ASSESSMENT
70y Female with h/o HTN, hypothyroidism, PAF, on Eliquis, admitted with progressively worsening dyspnea and AF RVR  b/l pleural effusion, pericardial effusion  Decreased EF on bedside echo (~40%)    con't tele  official echo (approved by Dr Mahajan and Dr Cadet)  start Amio IV load, stop Diltiazem  Stop Hydroxychloroquine unless test comes back positive  Pulmonary consult, might require pleurocentesis  Maintain electrolytes K>4.0 Mg >2.0 70y Female with h/o HTN, hypothyroidism, PAF, on Eliquis, admitted with progressively worsening dyspnea and AF RVR  b/l pleural effusion, pericardial effusion  Decreased EF on bedside echo (~40%)    con't tele  official echo (approved by Dr Mahajan and Dr Cadet)  start Amio IV load, stop Diltiazem  Stop Hydroxychloroquine unless test comes back positive  Pulmonary consult, might require pleurocentesis  Maintain electrolytes K>4.0 Mg >2.0  will follow    D/w Dr Mahajan 70y Female with h/o HTN, hypothyroidism, PAF, on Eliquis, admitted with progressively worsening dyspnea and AF RVR  b/l pleural effusion, pericardial effusion  Decreased EF on bedside echo (~40%)    con't tele  official echo (approved by Dr Mahajan and Dr Cadet)  start Amio IV load, stop Diltiazem  Stop Hydroxychloroquine unless test comes back positive  con't Eliquis, unless any invasive procedures planned  might require CCTA to r/o DEREJE thrombus if Eliquis is held  Pulmonary consult, might require pleurocentesis  Maintain electrolytes K>4.0 Mg >2.0  will follow    D/w Dr Mahajan

## 2020-04-20 NOTE — PROGRESS NOTE ADULT - SUBJECTIVE AND OBJECTIVE BOX
SUBJECTIVE:    Patient is a 70y old Female who presents with a chief complaint of Dyspnea (20 Apr 2020 10:56)    Currently admitted to medicine with the primary diagnosis of SOB (shortness of breath)     Today is hospital day 1d. This morning she is resting comfortably in bed and reports no new issues or overnight events.     INTERVAL EVENTS: pt seen resting no complaints,    PAST MEDICAL & SURGICAL HISTORY  Vitamin D deficiency  Afib  Hypothyroid  Hypertension  S/P cholecystectomy      ALLERGIES:  No Known Allergies    MEDICATIONS:  STANDING MEDICATIONS  aMIOdarone Infusion 1 mG/Min IV Continuous <Continuous>  aMIOdarone Infusion 0.5 mG/Min IV Continuous <Continuous>  apixaban 5 milliGRAM(s) Oral every 12 hours  ergocalciferol 55021 Unit(s) Oral every week  furosemide   Injectable 40 milliGRAM(s) IV Push two times a day  levothyroxine 50 MICROGram(s) Oral daily  metoprolol tartrate 50 milliGRAM(s) Oral two times a day  multivitamin 1 Tablet(s) Oral daily  pantoprazole    Tablet 40 milliGRAM(s) Oral before breakfast    PRN MEDICATIONS  acetaminophen   Tablet .. 650 milliGRAM(s) Oral every 6 hours PRN  guaifenesin/dextromethorphan  Syrup 10 milliLiter(s) Oral every 4 hours PRN    VITALS:   T(F): 97.8  HR: 119  BP: 98/75  RR: 18  SpO2: 99%    LABS:                        13.3   10.24 )-----------( 290      ( 20 Apr 2020 05:20 )             40.9     04-20    145  |  104  |  11  ----------------------------<  109<H>  3.6   |  25  |  0.7    Ca    9.6      20 Apr 2020 05:20  Mg     2.0     04-20    TPro  6.0  /  Alb  4.0  /  TBili  0.6  /  DBili  x   /  AST  37  /  ALT  51<H>  /  AlkPhos  61  04-20    PT/INR - ( 19 Apr 2020 16:20 )   PT: 16.40 sec;   INR: 1.43 ratio         PTT - ( 19 Apr 2020 16:20 )  PTT:32.3 sec      Troponin T, Serum: <0.01 ng/mL (04-20-20 @ 05:20)  Sedimentation Rate, Erythrocyte: 4 mm/Hr (04-19-20 @ 20:00)  Creatine Kinase, Serum: 141 U/L (04-19-20 @ 20:00)  Troponin T, Serum: <0.01 ng/mL (04-19-20 @ 20:00)  Lactate, Blood: 1.4 mmol/L (04-19-20 @ 16:20)  Creatine Kinase, Serum: 135 U/L (04-19-20 @ 16:20)  Troponin T, Serum: <0.01 ng/mL (04-19-20 @ 16:20)      CARDIAC MARKERS ( 20 Apr 2020 05:20 )  x     / <0.01 ng/mL / x     / x     / x      CARDIAC MARKERS ( 19 Apr 2020 20:00 )  x     / <0.01 ng/mL / 141 U/L / x     / x      CARDIAC MARKERS ( 19 Apr 2020 16:20 )  x     / <0.01 ng/mL / 135 U/L / x     / x          RADIOLOGY:    PHYSICAL EXAM:  GEN: No acute distress 2 liters nasal cannula 98%  PULM/CHEST: Clear to auscultation bilaterally,   CVS: Regular rate and rhythm, S1-S2, no murmurs  ABD: Soft, non-tender, non-distended, +BS  EXT: No edema  NEURO: AAOx3    Lockhart Catheter:

## 2020-04-20 NOTE — CONSULT NOTE ADULT - ASSESSMENT
IMPRESSION:    Bilateral pleural effusions likely due to volume overload  R/O COVID  Moderate pericardial effusion?  Afib with RVR; on Eliquis  HO recent atypical pneumonia s/p treatment    RECOMMEND:    Continue lasix as tolerated  Follow up EP and cardio for rate control  Follow up repeat 2D echo  Follow up COVID pcr  Hold Eliquis for possible thoracentesis in am  Repeat CXR in am  Wean off O2  HOB >45 IMPRESSION:    Bilateral pleural effusions likely due to volume overload  R/O COVID  Moderate pericardial effusion?  Afib with RVR; on Eliquis  HO recent atypical pneumonia s/p treatment    RECOMMEND:    Continue lasix as tolerated  Follow up EP and cardio for rate control  Follow up repeat 2D echo  Follow up COVID pcr  No thoracentesis at this time as pt has less oxygen requirements and feels better today  Repeat CXR in am  Wean off O2  HOB >45

## 2020-04-21 LAB
ALBUMIN SERPL ELPH-MCNC: 4.2 G/DL — SIGNIFICANT CHANGE UP (ref 3.5–5.2)
ALP SERPL-CCNC: 64 U/L — SIGNIFICANT CHANGE UP (ref 30–115)
ALT FLD-CCNC: 46 U/L — HIGH (ref 0–41)
ANION GAP SERPL CALC-SCNC: 14 MMOL/L — SIGNIFICANT CHANGE UP (ref 7–14)
AST SERPL-CCNC: 30 U/L — SIGNIFICANT CHANGE UP (ref 0–41)
BILIRUB SERPL-MCNC: 0.8 MG/DL — SIGNIFICANT CHANGE UP (ref 0.2–1.2)
BUN SERPL-MCNC: 12 MG/DL — SIGNIFICANT CHANGE UP (ref 10–20)
CALCIUM SERPL-MCNC: 9.7 MG/DL — SIGNIFICANT CHANGE UP (ref 8.5–10.1)
CHLORIDE SERPL-SCNC: 100 MMOL/L — SIGNIFICANT CHANGE UP (ref 98–110)
CO2 SERPL-SCNC: 28 MMOL/L — SIGNIFICANT CHANGE UP (ref 17–32)
CREAT SERPL-MCNC: 0.8 MG/DL — SIGNIFICANT CHANGE UP (ref 0.7–1.5)
GLUCOSE SERPL-MCNC: 117 MG/DL — HIGH (ref 70–99)
HCT VFR BLD CALC: 41.7 % — SIGNIFICANT CHANGE UP (ref 37–47)
HGB BLD-MCNC: 13.8 G/DL — SIGNIFICANT CHANGE UP (ref 12–16)
MCHC RBC-ENTMCNC: 29.9 PG — SIGNIFICANT CHANGE UP (ref 27–31)
MCHC RBC-ENTMCNC: 33.1 G/DL — SIGNIFICANT CHANGE UP (ref 32–37)
MCV RBC AUTO: 90.3 FL — SIGNIFICANT CHANGE UP (ref 81–99)
NRBC # BLD: 0 /100 WBCS — SIGNIFICANT CHANGE UP (ref 0–0)
PLATELET # BLD AUTO: 329 K/UL — SIGNIFICANT CHANGE UP (ref 130–400)
POTASSIUM SERPL-MCNC: 3.5 MMOL/L — SIGNIFICANT CHANGE UP (ref 3.5–5)
POTASSIUM SERPL-SCNC: 3.5 MMOL/L — SIGNIFICANT CHANGE UP (ref 3.5–5)
PROT SERPL-MCNC: 6.4 G/DL — SIGNIFICANT CHANGE UP (ref 6–8)
RBC # BLD: 4.62 M/UL — SIGNIFICANT CHANGE UP (ref 4.2–5.4)
RBC # FLD: 13.8 % — SIGNIFICANT CHANGE UP (ref 11.5–14.5)
SODIUM SERPL-SCNC: 142 MMOL/L — SIGNIFICANT CHANGE UP (ref 135–146)
WBC # BLD: 10.11 K/UL — SIGNIFICANT CHANGE UP (ref 4.8–10.8)
WBC # FLD AUTO: 10.11 K/UL — SIGNIFICANT CHANGE UP (ref 4.8–10.8)

## 2020-04-21 PROCEDURE — 99233 SBSQ HOSP IP/OBS HIGH 50: CPT

## 2020-04-21 PROCEDURE — 71045 X-RAY EXAM CHEST 1 VIEW: CPT | Mod: 26

## 2020-04-21 PROCEDURE — 75574 CT ANGIO HRT W/3D IMAGE: CPT | Mod: 26

## 2020-04-21 PROCEDURE — 99232 SBSQ HOSP IP/OBS MODERATE 35: CPT

## 2020-04-21 RX ORDER — METOPROLOL TARTRATE 50 MG
5 TABLET ORAL ONCE
Refills: 0 | Status: COMPLETED | OUTPATIENT
Start: 2020-04-21 | End: 2020-04-21

## 2020-04-21 RX ORDER — APIXABAN 2.5 MG/1
5 TABLET, FILM COATED ORAL EVERY 12 HOURS
Refills: 0 | Status: DISCONTINUED | OUTPATIENT
Start: 2020-04-21 | End: 2020-04-24

## 2020-04-21 RX ORDER — AMIODARONE HYDROCHLORIDE 400 MG/1
400 TABLET ORAL EVERY 12 HOURS
Refills: 0 | Status: DISCONTINUED | OUTPATIENT
Start: 2020-04-21 | End: 2020-04-24

## 2020-04-21 RX ORDER — DILTIAZEM HCL 120 MG
30 CAPSULE, EXT RELEASE 24 HR ORAL ONCE
Refills: 0 | Status: COMPLETED | OUTPATIENT
Start: 2020-04-21 | End: 2020-04-21

## 2020-04-21 RX ORDER — METOPROLOL TARTRATE 50 MG
100 TABLET ORAL ONCE
Refills: 0 | Status: COMPLETED | OUTPATIENT
Start: 2020-04-21 | End: 2020-04-21

## 2020-04-21 RX ADMIN — APIXABAN 5 MILLIGRAM(S): 2.5 TABLET, FILM COATED ORAL at 09:26

## 2020-04-21 RX ADMIN — AMIODARONE HYDROCHLORIDE 400 MILLIGRAM(S): 400 TABLET ORAL at 17:47

## 2020-04-21 RX ADMIN — Medication 40 MILLIGRAM(S): at 05:17

## 2020-04-21 RX ADMIN — Medication 30 MILLIGRAM(S): at 18:43

## 2020-04-21 RX ADMIN — Medication 50 MILLIGRAM(S): at 05:16

## 2020-04-21 RX ADMIN — APIXABAN 5 MILLIGRAM(S): 2.5 TABLET, FILM COATED ORAL at 17:47

## 2020-04-21 RX ADMIN — Medication 50 MICROGRAM(S): at 05:16

## 2020-04-21 RX ADMIN — Medication 1 TABLET(S): at 11:00

## 2020-04-21 RX ADMIN — Medication 5 MILLIGRAM(S): at 17:46

## 2020-04-21 RX ADMIN — Medication 100 MILLIGRAM(S): at 13:33

## 2020-04-21 RX ADMIN — PANTOPRAZOLE SODIUM 40 MILLIGRAM(S): 20 TABLET, DELAYED RELEASE ORAL at 05:16

## 2020-04-21 RX ADMIN — Medication 50 MILLIGRAM(S): at 17:47

## 2020-04-21 RX ADMIN — AMIODARONE HYDROCHLORIDE 400 MILLIGRAM(S): 400 TABLET ORAL at 11:00

## 2020-04-21 RX ADMIN — Medication 40 MILLIGRAM(S): at 17:47

## 2020-04-21 NOTE — PROGRESS NOTE ADULT - ASSESSMENT
A 69 years old female presented with worsening sob for the last two weeks. Also C/O palpitations.       1.	Ac. HFrEF  2.	B/L Pleural effusions.   3.	A-Fib with RVR  4.	HTN  5.	Hypothyroidism  6.	H/O recent Atypical PNA S/P treatment  7.	Suspected COVID in fection         PLAN:     ·	Lasix 40 mg ivp q 12h. First dose STAT  ·	Check i's and o's and daily wt  ·	Low salt diet and water restriction to 1.5 L/D  ·	ECHO reviewed. EF 35-40%. Small pericardial effusion.   ·	Repeat CXR in AM  ·	Care D/W the EP. D/C Cardizem. On Amiodarone drip  ·	Cont Metoprolol 50 mg po q 12h.   ·	Check COVID result  ·	D-dimer 449, CRP 0.47, Procal 0.02  ·	CE x 3 are negative  ·	TSH, T3, T4 are normal A 69 years old female presented with worsening sob for the last two weeks. Also C/O palpitations.       1.	Ac. HFrEF  2.	B/L Pleural effusions.   3.	A-Fib with RVR  4.	HTN  5.	Hypothyroidism  6.	H/O recent Atypical PNA S/P treatment  7.	COVID-19 infection ruled out.          PLAN:     ·	Cont Lasix 40 mg ivp q 12h.   ·	Check i's and o's and daily wt  ·	Low salt diet and water restriction to 1.5 L/D  ·	ECHO reviewed. EF 35-40%. Small pericardial effusion.   ·	Once the Amiodarone drip ends, start him on Amiodarone 400 mg po q 12h  ·	Cardiology and EP F/U  ·	Cont Metoprolol 50 mg po q 12h.   ·	COVID result came back negative.   ·	D-dimer 449, CRP 0.47, Procal 0.02  ·	CE x 3 are negative  ·	TSH, T3, T4 are normal

## 2020-04-21 NOTE — PROGRESS NOTE ADULT - ASSESSMENT
69 year old female patient with past medical history of hypertension, Afib, hypothyroidism presented to the ED for dyspnea     INTERVAL EVENTS: covid negative, lasix 40mg q12 pt reports good urine output, asked nurse for strict is and os especially after lasix doses, pt satting 94% on room air.   repeat xray this am looks more congested pending officail read but clinically pt looks very comfortable  # Dyspnea secondary to CHF vs suspected COVID-19   SaO2 94 % room air, chest x ray showing bilateral pleural effusions and possible vascular congestion more pronounced on the right, repeat xray this am looks more congested pending officail read but clinically pt looks very comfortable  BNP 2392 trop negative x3  Lasix increased to 40mg Iv BID  Chest CT bilateral effusions with moderate pericardial effusion  O2 supplementation PRN to keep SaO2 between 90 and 92 %   Pulm consulted for possible thoracentesis will hold eliquis for possible tap in am   -repeat xray in am    #Covid pending- not detected  Fibrinogen Assay: 411.0 mg/dL (04.19.20 @ 16:20)  Lactate Dehydrogenase, Serum: 361 (04.19.20 @ 16:20)  D-Dimer Assay, Quantitative: 449 ng/mL DDU (04.19.20 @ 16:20)  -rest of immune hyperactivation panel : ESR 4, CRP 0.47, procalcitonin 0.02, Ferritin 54, Creatine kinase 141    # Afib with RVR and resultant pulmonary vascular congestion, pt remains tachy 110s  Per Ep c/w amio drip ( was on cardizem drip) - will rate control for now while having work up for effusions and later possibly cardiovert  Home dose of Bisoprolol changed to equivalent dose of Lopressor 50 mg BID  Monitor on Telemetry   Continue Lasix 40 mg Iv BID - pt with good urine outout  Strict Is and os and daily weights with Fluid restriction of 1500 mL/day   -Official echo ordered EF 35-40% 4-20 previous echo 1-20 EF between 60 and 65 %   held Eliquis 5 mg BID as above for possible tap    # Hypothyroidism   Continue Synthroid 50 mcg daily    TSH and T4 wnl    # HTN   Off Losartan at home. Consider restarting if BP elevated     # Vitamin D deficiency   Continue Vitamin D2 50 000 units weekly     # Miscellaneous   DVT prophylaxis : Patient on Eliquis was held last night  GI prophylaxis : Protonix 40 mg daily   Activity : Ambulate as tolerated   Diet : DASH TLC   Code status : Full code 69 year old female patient with past medical history of hypertension, Afib, hypothyroidism presented to the ED for dyspnea     INTERVAL EVENTS: covid negative, lasix 40mg q12 pt reports good urine output, asked nurse for strict is and os especially after lasix doses, pt satting 94% on room air.   restarting elliquis no tap  # Dyspnea secondary to CHF vs suspected COVID-19   SaO2 94 % room air, chest x ray showing bilateral pleural effusions and possible vascular congestion more pronounced on the right,  BNP 2392 trop negative x3  Lasix increased to 40mg Iv BID  Chest CT bilateral effusions with moderate pericardial effusion  O2 supplementation PRN to keep SaO2 between 90 and 92 %   Pulm consulted for possible thoracentesis hold off for now as pt responding well with IV lasix and a CHF picture    #Covid pending- not detected-will transfer to  front -non covid  Fibrinogen Assay: 411.0, Lactate Dehydrogenase, 361 D-Dimer Assay 449  ESR 4, CRP 0.47, procalcitonin 0.02, Ferritin 54, Creatine kinase 141    # Afib with RVR and resultant pulmonary vascular congestion, pt remains tachy 110s  Per Ep c/w amio drip ( was on cardizem drip) - will rate control for now while having work up for effusions and later possibly cardiovert  Home dose of Bisoprolol changed to equivalent dose of Lopressor 50 mg BID  Monitor on Telemetry   Continue Lasix 40 mg Iv BID - pt with good urine outout  Strict Is and os and daily weights with Fluid restriction of 1500 mL/day   -Official echo ordered EF 35-40% 4-20 previous echo 1-20 EF between 60 and 65 %     # Hypothyroidism   Continue Synthroid 50 mcg daily    TSH and T4 wnl    # HTN   Off Losartan at home. Consider restarting if BP elevated     # Vitamin D deficiency   Continue Vitamin D2 50 000 units weekly     # Miscellaneous   DVT prophylaxis : Patient on Eliquis   GI prophylaxis : Protonix 40 mg daily   Activity : Ambulate as tolerated   Diet : DASH TLC   Code status : Full code

## 2020-04-21 NOTE — PROGRESS NOTE ADULT - SUBJECTIVE AND OBJECTIVE BOX
ALVARO OLIVERA  70y Female    CHIEF COMPLAINT:    Patient is a 70y old  Female who presents with a chief complaint of Dyspnea (2020 07:45)      INTERVAL HPI/OVERNIGHT EVENTS:    Patient seen and examined. Still having sob but improving. Still having cough. No fever. Also C/O palpitations.      ROS: All other systems are negative.    Vital Signs:    T(F): 98.6 (20 @ 07:44), Max: 98.6 (20 @ 20:42)  HR: 119 (20 @ 09:45) (101 - 124)  BP: 115/70 (20 @ 09:45) (94/58 - 125/70)  RR: 18 (20 @ 05:11) (14 - 18)  SpO2: 91% (20 @ 10:45) (91% - 99%)  I&O's Summary    2020 07:01  -  2020 07:00  --------------------------------------------------------  IN: 1175.6 mL / OUT: 1475 mL / NET: -299.4 mL    2020 07:01  -  2020 12:05  --------------------------------------------------------  IN: 310 mL / OUT: 800 mL / NET: -490 mL      Daily     Daily Weight in k.2 (2020 05:27)  CAPILLARY BLOOD GLUCOSE          PHYSICAL EXAM:    GENERAL:  NAD  SKIN: No rashes or lesions  HENT: Atraumatic Normocephalic. PERRL. Moist membranes.  NECK: Supple, No JVD. No lymphadenopathy.  PULMONARY: BS are decreased in the lower chest post B/L. No wheezing. No rales  CVS: Normal S1, S2. Rate and Rhythm are IRIR. No murmurs.  ABDOMEN/GI: Soft, Nontender, Nondistended; BS present  EXTREMITIES: Peripheral pulses intact. No edema B/L LE.  NEUROLOGIC:  No motor or sensory deficit.  PSYCH: Alert & oriented x 3    Consultant(s) Notes Reviewed:  [x ] YES  [ ] NO  Care Discussed with Consultants/Other Providers [ x] YES  [ ] NO    EKG reviewed  Telemetry reviewed    LABS:                        13.8   10.11 )-----------( 329      ( 2020 07:35 )             41.7         142  |  100  |  12  ----------------------------<  117<H>  3.5   |  28  |  0.8    Ca    9.7      2020 07:35  Mg     2.0         TPro  6.4  /  Alb  4.2  /  TBili  0.8  /  DBili  x   /  AST  30  /  ALT  46<H>  /  AlkPhos  64      PT/INR - ( 2020 16:20 )   PT: 16.40 sec;   INR: 1.43 ratio         PTT - ( 2020 16:20 )  PTT:32.3 sec  Serum Pro-Brain Natriuretic Peptide: 2392 pg/mL (20 @ 16:20)    Trop <0.01, CKMB --, CK --, 20 @ 05:20  Trop <0.01, CKMB --, , 20 @ 20:00  Trop <0.01, CKMB --, , 20 @ 16:20      Culture - Blood (collected 2020 16:20)  Source: .Blood Blood-Peripheral  Preliminary Report (2020 01:02):    No growth to date.    Culture - Blood (collected 2020 16:20)  Source: .Blood Blood-Peripheral  Preliminary Report (2020 01:02):    No growth to date.        RADIOLOGY & ADDITIONAL TESTS:      Imaging or report Personally Reviewed:  [ ] YES  [ ] NO    Medications:  Standing  aMIOdarone    Tablet 400 milliGRAM(s) Oral every 12 hours  apixaban 5 milliGRAM(s) Oral every 12 hours  ergocalciferol 49836 Unit(s) Oral every week  furosemide   Injectable 40 milliGRAM(s) IV Push two times a day  levothyroxine 50 MICROGram(s) Oral daily  metoprolol tartrate 50 milliGRAM(s) Oral two times a day  multivitamin 1 Tablet(s) Oral daily  pantoprazole    Tablet 40 milliGRAM(s) Oral before breakfast    PRN Meds  acetaminophen   Tablet .. 650 milliGRAM(s) Oral every 6 hours PRN  guaifenesin/dextromethorphan  Syrup 10 milliLiter(s) Oral every 4 hours PRN      Case discussed with resident    Care discussed with pt/family ALVARO OLIVERA  70y Female    CHIEF COMPLAINT:    Patient is a 70y old  Female who presents with a chief complaint of Dyspnea (2020 07:45)      INTERVAL HPI/OVERNIGHT EVENTS:    Patient seen and examined. Still having sob but improving. Still having cough. No fever. Also C/O palpitations.      ROS: All other systems are negative.    Vital Signs:    T(F): 98.6 (20 @ 07:44), Max: 98.6 (20 @ 20:42)  HR: 119 (20 @ 09:45) (101 - 124)  BP: 115/70 (20 @ 09:45) (94/58 - 125/70)  RR: 18 (20 @ 05:11) (14 - 18)  SpO2: 91% (20 @ 10:45) (91% - 99%)  I&O's Summary    2020 07:01  -  2020 07:00  --------------------------------------------------------  IN: 1175.6 mL / OUT: 1475 mL / NET: -299.4 mL    2020 07:01  -  2020 12:05  --------------------------------------------------------  IN: 310 mL / OUT: 800 mL / NET: -490 mL      Daily     Daily Weight in k.2 (2020 05:27)  CAPILLARY BLOOD GLUCOSE          PHYSICAL EXAM:    GENERAL:  NAD  SKIN: No rashes or lesions  HENT: Atraumatic Normocephalic. PERRL. Moist membranes.  NECK: Supple, No JVD. No lymphadenopathy.  PULMONARY: BS are decreased in the lower chest post B/L. No wheezing. No rales  CVS: Normal S1, S2. Rate and Rhythm are IRIR. No murmurs.  ABDOMEN/GI: Soft, Nontender, Nondistended; BS present  EXTREMITIES: Peripheral pulses intact. Trace edema B/L LE.  NEUROLOGIC:  No motor or sensory deficit.  PSYCH: Alert & oriented x 3    Consultant(s) Notes Reviewed:  [x ] YES  [ ] NO  Care Discussed with Consultants/Other Providers [ x] YES  [ ] NO    EKG reviewed  Telemetry reviewed    LABS:                        13.8   10.11 )-----------( 329      ( 2020 07:35 )             41.7         142  |  100  |  12  ----------------------------<  117<H>  3.5   |  28  |  0.8    Ca    9.7      2020 07:35  Mg     2.0         TPro  6.4  /  Alb  4.2  /  TBili  0.8  /  DBili  x   /  AST  30  /  ALT  46<H>  /  AlkPhos  64      PT/INR - ( 2020 16:20 )   PT: 16.40 sec;   INR: 1.43 ratio         PTT - ( 2020 16:20 )  PTT:32.3 sec  Serum Pro-Brain Natriuretic Peptide: 2392 pg/mL (20 @ 16:20)    Trop <0.01, CKMB --, CK --, 20 @ 05:20  Trop <0.01, CKMB --, , 20 @ 20:00  Trop <0.01, CKMB --, , 20 @ 16:20      Culture - Blood (collected 2020 16:20)  Source: .Blood Blood-Peripheral  Preliminary Report (2020 01:02):    No growth to date.    Culture - Blood (collected 2020 16:20)  Source: .Blood Blood-Peripheral  Preliminary Report (2020 01:02):    No growth to date.        RADIOLOGY & ADDITIONAL TESTS:      Imaging or report Personally Reviewed:  [ ] YES  [ ] NO    Medications:  Standing  aMIOdarone    Tablet 400 milliGRAM(s) Oral every 12 hours  apixaban 5 milliGRAM(s) Oral every 12 hours  ergocalciferol 96021 Unit(s) Oral every week  furosemide   Injectable 40 milliGRAM(s) IV Push two times a day  levothyroxine 50 MICROGram(s) Oral daily  metoprolol tartrate 50 milliGRAM(s) Oral two times a day  multivitamin 1 Tablet(s) Oral daily  pantoprazole    Tablet 40 milliGRAM(s) Oral before breakfast    PRN Meds  acetaminophen   Tablet .. 650 milliGRAM(s) Oral every 6 hours PRN  guaifenesin/dextromethorphan  Syrup 10 milliLiter(s) Oral every 4 hours PRN      Case discussed with resident    Care discussed with pt/family

## 2020-04-21 NOTE — PROGRESS NOTE ADULT - ASSESSMENT
70y Female with h/o HTN, hypothyroidism, PAF, on Eliquis, admitted with progressively worsening dyspnea and AF RVR  b/l pleural effusion, pericardial effusion  New CMP EF 35-40%  COVID-19 negative    con't tele  con't Amio IV load, convert to PO  Restart Eliquis, no plan for pleurocentesis  CCTA to r/o DEREJE thrombus   DCCV in AM, keep NPO  Maintain electrolytes K>4.0 Mg >2.0  will follow

## 2020-04-21 NOTE — PROGRESS NOTE ADULT - SUBJECTIVE AND OBJECTIVE BOX
INTERVAL HPI/OVERNIGHT EVENTS:  still tachycardic 110-120as  breathing better  no plan for tap per Pulm  diuresing well    < from: Transthoracic Echocardiogram (04.20.20 @ 10:09) >  Summary:   1. Left ventricular ejection fraction, by visual estimation, is 35 to 40%.   2. Grossly moderately decreased global left ventricular systolic function.   3. LA volume Index is 41.0 ml/m² ml/m2.   4. Small pericardial effusion.    < end of copied text >      MEDICATIONS  (STANDING):  aMIOdarone    Tablet 400 milliGRAM(s) Oral every 12 hours  apixaban 5 milliGRAM(s) Oral every 12 hours  ergocalciferol 54385 Unit(s) Oral every week  furosemide   Injectable 40 milliGRAM(s) IV Push two times a day  levothyroxine 50 MICROGram(s) Oral daily  metoprolol tartrate 50 milliGRAM(s) Oral two times a day  metoprolol tartrate Injectable 5 milliGRAM(s) IV Push once  metoprolol tartrate Injectable 5 milliGRAM(s) IV Push once  multivitamin 1 Tablet(s) Oral daily  pantoprazole    Tablet 40 milliGRAM(s) Oral before breakfast    MEDICATIONS  (PRN):  acetaminophen   Tablet .. 650 milliGRAM(s) Oral every 6 hours PRN Temp greater or equal to 38C (100.4F)  guaifenesin/dextromethorphan  Syrup 10 milliLiter(s) Oral every 4 hours PRN Cough      Allergies    No Known Allergies    Intolerances        REVIEW OF SYSTEMS    [x ] A ten-point review of systems was otherwise negative except as noted.      Vital Signs Last 24 Hrs  T(C): 36.8 (21 Apr 2020 13:05), Max: 37 (20 Apr 2020 20:42)  T(F): 98.2 (21 Apr 2020 13:05), Max: 98.6 (20 Apr 2020 20:42)  HR: 110 (21 Apr 2020 13:05) (101 - 124)  BP: 108/78 (21 Apr 2020 13:05) (94/58 - 125/70)  BP(mean): --  RR: 20 (21 Apr 2020 13:05) (14 - 20)  SpO2: 91% (21 Apr 2020 10:45) (91% - 95%)    04-20-20 @ 07:01  -  04-21-20 @ 07:00  --------------------------------------------------------  IN: 1175.6 mL / OUT: 1475 mL / NET: -299.4 mL    04-21-20 @ 07:01  -  04-21-20 @ 14:00  --------------------------------------------------------  IN: 310 mL / OUT: 800 mL / NET: -490 mL        PHYSICAL EXAM:    GENERAL: appears dyspnic while talking, well nourished, and hydrated.  HEAD:  Atraumatic, Normocephalic  EYES: EOMI, PERRLA, conjunctiva and sclera clear  NECK: Supple and normal thyroid.  No JVD or carotid bruit.  Carotid pulse is 2+ bilaterally.  HEART: IRRegular tachycardic; No murmurs, rubs, or gallops.  PULMONARY: diffuse rhonchi, bilateral rales, diminished breath sounds at the bases.   ABDOMEN: Soft, Nontender, Nondistended; Bowel sounds present  EXTREMITIES:  2+ Peripheral Pulses, No clubbing, cyanosis, or 1+ edema  NEUROLOGICAL: Grossly nonfocal    LABS:                        13.8   10.11 )-----------( 329      ( 21 Apr 2020 07:35 )             41.7     04-21    142  |  100  |  12  ----------------------------<  117<H>  3.5   |  28  |  0.8    Ca    9.7      21 Apr 2020 07:35  Mg     2.0     04-20    TPro  6.4  /  Alb  4.2  /  TBili  0.8  /  DBili  x   /  AST  30  /  ALT  46<H>  /  AlkPhos  64  04-21    PT/INR - ( 19 Apr 2020 16:20 )   PT: 16.40 sec;   INR: 1.43 ratio         PTT - ( 19 Apr 2020 16:20 )  PTT:32.3 sec          RADIOLOGY & ADDITIONAL TESTS:

## 2020-04-21 NOTE — PROGRESS NOTE ADULT - SUBJECTIVE AND OBJECTIVE BOX
SUBJECTIVE:    Patient is a 70y old Female who presents with a chief complaint of Dyspnea (20 Apr 2020 15:38)    Currently admitted to medicine with the primary diagnosis of SOB (shortness of breath)     Today is hospital day 2d. This morning she is resting comfortably in bed and reports no new issues or overnight events.     INTERVAL EVENTS: covid negative, lasix 40mg q12 pt reports good urine output, asked nurse for strict is and os especially after lasix doses, pt satting 94% on room air.     PAST MEDICAL & SURGICAL HISTORY  Vitamin D deficiency  Afib  Hypothyroid  Hypertension  S/P cholecystectomy      ALLERGIES:  No Known Allergies    MEDICATIONS:  STANDING MEDICATIONS  aMIOdarone Infusion 1 mG/Min IV Continuous <Continuous>  aMIOdarone Infusion 0.5 mG/Min IV Continuous <Continuous>  ergocalciferol 47470 Unit(s) Oral every week  furosemide   Injectable 40 milliGRAM(s) IV Push two times a day  levothyroxine 50 MICROGram(s) Oral daily  metoprolol tartrate 50 milliGRAM(s) Oral two times a day  multivitamin 1 Tablet(s) Oral daily  pantoprazole    Tablet 40 milliGRAM(s) Oral before breakfast    PRN MEDICATIONS  acetaminophen   Tablet .. 650 milliGRAM(s) Oral every 6 hours PRN  guaifenesin/dextromethorphan  Syrup 10 milliLiter(s) Oral every 4 hours PRN    VITALS:   T(F): 98.3  HR: 118  BP: 111/72  RR: 18  SpO2: 95%    LABS:                        13.3   10.24 )-----------( 290      ( 20 Apr 2020 05:20 )             40.9     04-20    145  |  104  |  11  ----------------------------<  109<H>  3.6   |  25  |  0.7    Ca    9.6      20 Apr 2020 05:20  Mg     2.0     04-20    TPro  6.0  /  Alb  4.0  /  TBili  0.6  /  DBili  x   /  AST  37  /  ALT  51<H>  /  AlkPhos  61  04-20    PT/INR - ( 19 Apr 2020 16:20 )   PT: 16.40 sec;   INR: 1.43 ratio         PTT - ( 19 Apr 2020 16:20 )  PTT:32.3 sec          Culture - Blood (collected 19 Apr 2020 16:20)  Source: .Blood Blood-Peripheral  Preliminary Report (21 Apr 2020 01:02):    No growth to date.    Culture - Blood (collected 19 Apr 2020 16:20)  Source: .Blood Blood-Peripheral  Preliminary Report (21 Apr 2020 01:02):    No growth to date.      CARDIAC MARKERS ( 20 Apr 2020 05:20 )  x     / <0.01 ng/mL / x     / x     / x      CARDIAC MARKERS ( 19 Apr 2020 20:00 )  x     / <0.01 ng/mL / 141 U/L / x     / x      CARDIAC MARKERS ( 19 Apr 2020 16:20 )  x     / <0.01 ng/mL / 135 U/L / x     / x          RADIOLOGY:    PHYSICAL EXAM:  GEN: No acute distress, nasal cannula around neck not in nostrils satting 94%  PULM/CHEST: mild crackles at b/l bases  CVS: irregular, tachycardic  ABD: Soft, non-tender, non-distended, +BS  EXT: No edema  NEURO: AAOx3    Lockhart Catheter:

## 2020-04-22 ENCOUNTER — TRANSCRIPTION ENCOUNTER (OUTPATIENT)
Age: 70
End: 2020-04-22

## 2020-04-22 LAB
ALBUMIN SERPL ELPH-MCNC: 3.5 G/DL — SIGNIFICANT CHANGE UP (ref 3.5–5.2)
ALP SERPL-CCNC: 50 U/L — SIGNIFICANT CHANGE UP (ref 30–115)
ALT FLD-CCNC: 34 U/L — SIGNIFICANT CHANGE UP (ref 0–41)
ANION GAP SERPL CALC-SCNC: 12 MMOL/L — SIGNIFICANT CHANGE UP (ref 7–14)
AST SERPL-CCNC: 23 U/L — SIGNIFICANT CHANGE UP (ref 0–41)
BILIRUB SERPL-MCNC: 0.5 MG/DL — SIGNIFICANT CHANGE UP (ref 0.2–1.2)
BUN SERPL-MCNC: 16 MG/DL — SIGNIFICANT CHANGE UP (ref 10–20)
CALCIUM SERPL-MCNC: 9 MG/DL — SIGNIFICANT CHANGE UP (ref 8.5–10.1)
CHLORIDE SERPL-SCNC: 103 MMOL/L — SIGNIFICANT CHANGE UP (ref 98–110)
CO2 SERPL-SCNC: 29 MMOL/L — SIGNIFICANT CHANGE UP (ref 17–32)
CREAT SERPL-MCNC: 0.9 MG/DL — SIGNIFICANT CHANGE UP (ref 0.7–1.5)
GLUCOSE SERPL-MCNC: 101 MG/DL — HIGH (ref 70–99)
HCT VFR BLD CALC: 37.7 % — SIGNIFICANT CHANGE UP (ref 37–47)
HGB BLD-MCNC: 12.8 G/DL — SIGNIFICANT CHANGE UP (ref 12–16)
MCHC RBC-ENTMCNC: 31.3 PG — HIGH (ref 27–31)
MCHC RBC-ENTMCNC: 34 G/DL — SIGNIFICANT CHANGE UP (ref 32–37)
MCV RBC AUTO: 92.2 FL — SIGNIFICANT CHANGE UP (ref 81–99)
NRBC # BLD: 0 /100 WBCS — SIGNIFICANT CHANGE UP (ref 0–0)
PLATELET # BLD AUTO: 277 K/UL — SIGNIFICANT CHANGE UP (ref 130–400)
POTASSIUM SERPL-MCNC: 3.4 MMOL/L — LOW (ref 3.5–5)
POTASSIUM SERPL-SCNC: 3.4 MMOL/L — LOW (ref 3.5–5)
PROT SERPL-MCNC: 5.3 G/DL — LOW (ref 6–8)
RBC # BLD: 4.09 M/UL — LOW (ref 4.2–5.4)
RBC # FLD: 13.5 % — SIGNIFICANT CHANGE UP (ref 11.5–14.5)
SODIUM SERPL-SCNC: 144 MMOL/L — SIGNIFICANT CHANGE UP (ref 135–146)
WBC # BLD: 8.93 K/UL — SIGNIFICANT CHANGE UP (ref 4.8–10.8)
WBC # FLD AUTO: 8.93 K/UL — SIGNIFICANT CHANGE UP (ref 4.8–10.8)

## 2020-04-22 PROCEDURE — 99233 SBSQ HOSP IP/OBS HIGH 50: CPT

## 2020-04-22 PROCEDURE — 99232 SBSQ HOSP IP/OBS MODERATE 35: CPT | Mod: 25

## 2020-04-22 PROCEDURE — 92960 CARDIOVERSION ELECTRIC EXT: CPT

## 2020-04-22 PROCEDURE — 99222 1ST HOSP IP/OBS MODERATE 55: CPT

## 2020-04-22 RX ORDER — METOPROLOL TARTRATE 50 MG
50 TABLET ORAL EVERY 8 HOURS
Refills: 0 | Status: DISCONTINUED | OUTPATIENT
Start: 2020-04-22 | End: 2020-04-23

## 2020-04-22 RX ORDER — POTASSIUM CHLORIDE 20 MEQ
40 PACKET (EA) ORAL EVERY 4 HOURS
Refills: 0 | Status: COMPLETED | OUTPATIENT
Start: 2020-04-22 | End: 2020-04-22

## 2020-04-22 RX ADMIN — Medication 50 MICROGRAM(S): at 06:05

## 2020-04-22 RX ADMIN — Medication 40 MILLIEQUIVALENT(S): at 15:35

## 2020-04-22 RX ADMIN — Medication 50 MILLIGRAM(S): at 06:05

## 2020-04-22 RX ADMIN — Medication 1 TABLET(S): at 11:09

## 2020-04-22 RX ADMIN — AMIODARONE HYDROCHLORIDE 400 MILLIGRAM(S): 400 TABLET ORAL at 06:06

## 2020-04-22 RX ADMIN — Medication 10 MILLILITER(S): at 09:21

## 2020-04-22 RX ADMIN — Medication 50 MILLIGRAM(S): at 21:44

## 2020-04-22 RX ADMIN — APIXABAN 5 MILLIGRAM(S): 2.5 TABLET, FILM COATED ORAL at 06:06

## 2020-04-22 RX ADMIN — APIXABAN 5 MILLIGRAM(S): 2.5 TABLET, FILM COATED ORAL at 17:38

## 2020-04-22 RX ADMIN — Medication 40 MILLIGRAM(S): at 06:06

## 2020-04-22 RX ADMIN — Medication 40 MILLIEQUIVALENT(S): at 11:09

## 2020-04-22 RX ADMIN — PANTOPRAZOLE SODIUM 40 MILLIGRAM(S): 20 TABLET, DELAYED RELEASE ORAL at 06:06

## 2020-04-22 RX ADMIN — AMIODARONE HYDROCHLORIDE 400 MILLIGRAM(S): 400 TABLET ORAL at 17:38

## 2020-04-22 RX ADMIN — Medication 50 MILLIGRAM(S): at 13:51

## 2020-04-22 RX ADMIN — Medication 40 MILLIGRAM(S): at 17:38

## 2020-04-22 NOTE — PROGRESS NOTE ADULT - SUBJECTIVE AND OBJECTIVE BOX
INTERVAL HISTORY: No overnight events.  scheduled for cv today, s/p ccta - no sign cad, no DEREJE trombus  + dry couph  	  MEDICATIONS:  acetaminophen   Tablet .. 650 milliGRAM(s) Oral every 6 hours PRN  aMIOdarone    Tablet 400 milliGRAM(s) Oral every 12 hours  apixaban 5 milliGRAM(s) Oral every 12 hours  benzonatate 100 milliGRAM(s) Oral three times a day PRN  ergocalciferol 97280 Unit(s) Oral every week  furosemide   Injectable 40 milliGRAM(s) IV Push two times a day  guaifenesin/dextromethorphan  Syrup 10 milliLiter(s) Oral every 4 hours PRN  levothyroxine 50 MICROGram(s) Oral daily  metoprolol tartrate 50 milliGRAM(s) Oral two times a day  multivitamin 1 Tablet(s) Oral daily  pantoprazole    Tablet 40 milliGRAM(s) Oral before breakfast  potassium chloride    Tablet ER 40 milliEquivalent(s) Oral every 4 hours      REVIEW OF SYSTEMS:  as above, otherwise unrem    PHYSICAL EXAM:  T(C): 37.3 (04-22-20 @ 06:00), Max: 37.3 (04-22-20 @ 06:00)  HR: 130 (04-22-20 @ 06:00) (105 - 130)  BP: 111/77 (04-22-20 @ 06:00) (98/50 - 114/71)  RR: 18 (04-22-20 @ 07:50) (18 - 20)  SpO2: 96% (04-22-20 @ 07:50) (94% - 96%)  Wt(kg): --  I&O's Summary    21 Apr 2020 07:01  -  22 Apr 2020 07:00  --------------------------------------------------------  IN: 810 mL / OUT: 1700 mL / NET: -890 mL        NEURO: patient is awake , alert and oriented  GEN: Not in acute distress  NECK: no thyroid enlargement, no JVD  LUNGS: Clear to auscultation bilaterally   CARDIOVASCULAR: S1/S2 present, irreg, no rubs, no carotid bruits,  + PP bilaterally  ABD: Soft, non-tender, non-distended, +BS  EXT/MS: No HETAL  SKIN: Intact        LABS:	 	                              12.8   8.93  )-----------( 277      ( 22 Apr 2020 05:04 )             37.7     04-22    144  |  103  |  16  ----------------------------<  101<H>  3.4<L>   |  29  |  0.9    Ca    9.0      22 Apr 2020 05:04    TPro  5.3<L>  /  Alb  3.5  /  TBili  0.5  /  DBili  x   /  AST  23  /  ALT  34  /  AlkPhos  50  04-22    proBNP:   Lipid Profile:

## 2020-04-22 NOTE — PROGRESS NOTE ADULT - ASSESSMENT
A.fib  CHF - likely tach induced  Hypothyroidism  HTN      cont present care  repeat x ray  for cv today  ? entresto  cont a/c and amio  lasix iv  supplement K/Mg as needed  will follow

## 2020-04-22 NOTE — CONSULT NOTE ADULT - PROBLEM SELECTOR RECOMMENDATION 9
Reported symptoms are likely due to new onset systolic heart failure in combination with bilateral pleural effusions     She is NYHA class III at home   Slightly overloaded on exam   Normal TSH   CT chest shows b/l pleural effusion, at least moderate on the right   Would increase furosemide to 40 mg iv BID until Friday, then switch to PO 40 mg daily  Consider draining the pleural effusion, especially on the right side    Start Entresto 24-26 mg BID   Increase metoprolol to 50 mg q6 hrs   Strict I/Os and daily weight  Would consider repeating DCCV after effusion and drained and patient more euvolemic   Send iron profile - serum iron, ferritin, TIBC, transferrin -   Discussed with medicine team  Will continue to follow Reported symptoms are likely due to new onset systolic heart failure in combination with bilateral pleural effusions     She is NYHA class III at home   Slightly overloaded on exam   Normal TSH   Negative COVID-19  CT chest shows b/l pleural effusion, at least moderate on the right   Would increase furosemide to 40 mg iv BID until Friday, then switch to PO 40 mg daily  Consider draining the pleural effusion, especially on the right side    Start Entresto 24-26 mg BID   Continue metoprolol tartrate 50 mg q8 hrs   Strict I/Os and daily weight  Would consider repeating DCCV after effusion and drained and patient more euvolemic   Send iron profile - serum iron, ferritin, TIBC, transferrin -   Discussed with medicine team  Will continue to follow

## 2020-04-22 NOTE — CONSULT NOTE ADULT - PROBLEM SELECTOR RECOMMENDATION 2
Likely related to afib with RVR   Cardiac CT showed normal coronaries   Normal TSH   Echo shows LVEF ~ 35%

## 2020-04-22 NOTE — DISCHARGE NOTE PROVIDER - NSDCCPCAREPLAN_GEN_ALL_CORE_FT
PRINCIPAL DISCHARGE DIAGNOSIS  Diagnosis: SOB (shortness of breath)  Assessment and Plan of Treatment: You had shortness of breath due to congestive heart failure. this was treated with IV lasix which diuresed the fluid from your lungs. Please contuinue to take prescribed medications. Please follow up with your cardiologist.      SECONDARY DISCHARGE DIAGNOSES  Diagnosis: CHF (congestive heart failure)  Assessment and Plan of Treatment: Treated as above.    Diagnosis: Atrial fibrillation with RVR  Assessment and Plan of Treatment: You had a rapid heart rate which was treated with IV and oral medications to control the heart rate. You underwent a procedure to restore the normal rate called cardioversion. PRINCIPAL DISCHARGE DIAGNOSIS  Diagnosis: SOB (shortness of breath)  Assessment and Plan of Treatment: You had shortness of breath due to congestive heart failure. this was treated with IV lasix which diuresed the fluid from your lungs. Please contuinue to take prescribed medications. Please follow up with your cardiologist.      SECONDARY DISCHARGE DIAGNOSES  Diagnosis: CHF (congestive heart failure)  Assessment and Plan of Treatment: Treated as above.    Diagnosis: Atrial fibrillation with RVR  Assessment and Plan of Treatment: You had a rapid heart rate which was treated with IV and oral medications to control the heart rate. You underwent a procedure to restore the normal rate called cardioversion this failed. Continue taking amiodarone 400mg twice a day for 7 more days then 200mg daily. You will follow up with Dr. Mahajan to reattemot cardioversion next week.

## 2020-04-22 NOTE — DISCHARGE NOTE PROVIDER - CARE PROVIDERS DIRECT ADDRESSES
[email or web address]

## 2020-04-22 NOTE — PROGRESS NOTE ADULT - ASSESSMENT
Assessment: 69 years old female presented with worsening sob for the last two weeks. Also C/O palpitations, found to be AF RVR in 110s.     Plan:  AF RVR, Dyspnea  BL pleural effusion, pericardial effusion  New CMP EF 35-40%  COVID-19 negative  - Unsuccessful DCCV today  - Cont Amio 400 mg Q12h  - Increase Lopressor to 50mg Q8h  - Cont Eliquis  - Cont diuresis  - When pt fully diuresed, will attempt CV again  - Monitor electrolytes, maintain WNL Assessment: 69 years old female presented with worsening sob for the last two weeks. Also C/O palpitations, found to be AF RVR in 110s.     Plan:  AF RVR, Dyspnea  BL pleural effusion, pericardial effusion  New CMP EF 35-40%  COVID-19 negative  - Unsuccessful DCCV today  - Cont Amio 400 mg Q12h  - EKG in am  - Increase Lopressor to 50mg Q8h  - Cont Eliquis  - Cont diuresis  - When pt fully diuresed, will attempt CV again once patietn is loaded with more amio  - Monitor electrolytes, maintain WNL

## 2020-04-22 NOTE — DISCHARGE NOTE PROVIDER - NSDCMRMEDTOKEN_GEN_ALL_CORE_FT
apixaban 5 mg oral tablet: 1 tab(s) orally every 12 hours  bisoprolol 5 mg oral tablet: 1 tab(s) orally once a day   levothyroxine 50 mcg (0.05 mg) oral tablet: 1 tab(s) orally once a day  Multiple Vitamins oral tablet: 1 tab(s) orally once a day  Vitamin D2 50,000 intl units (1.25 mg) oral capsule: 1 cap(s) orally once a week amiodarone 200 mg oral tablet: 2 tab(s) orally every 12 hours  for first 7 days then 200 daily  amiodarone 200 mg oral tablet: 1 tab(s) orally once a day   apixaban 5 mg oral tablet: 1 tab(s) orally every 12 hours  budesonide-formoterol 80 mcg-4.5 mcg/inh inhalation aerosol:  inhaled   furosemide 40 mg oral tablet: 1 tab(s) orally once a day  guaifenesin-dextromethorphan 100 mg-10 mg/5 mL oral liquid: 10 milliliter(s) orally every 4 hours, As needed, Cough  levothyroxine 50 mcg (0.05 mg) oral tablet: 1 tab(s) orally once a day  Multiple Vitamins oral tablet: 1 tab(s) orally once a day  sacubitril-valsartan 24 mg-26 mg oral tablet: 1 tab(s) orally 2 times a day  Toprol- mg oral tablet, extended release: 1 tab(s) orally once a day   Vitamin D2 50,000 intl units (1.25 mg) oral capsule: 1 cap(s) orally once a week amiodarone 200 mg oral tablet: 1 tab(s) orally once a day   amiodarone 200 mg oral tablet: 2 tab(s) orally every 12 hours  for first 7 days then 200 daily  apixaban 5 mg oral tablet: 1 tab(s) orally every 12 hours  budesonide-formoterol 80 mcg-4.5 mcg/inh inhalation aerosol:  inhaled   furosemide 40 mg oral tablet: 1 tab(s) orally once a day  guaifenesin-dextromethorphan 100 mg-10 mg/5 mL oral liquid: 10 milliliter(s) orally every 4 hours, As needed, Cough  levothyroxine 50 mcg (0.05 mg) oral tablet: 1 tab(s) orally once a day  Multiple Vitamins oral tablet: 1 tab(s) orally once a day  sacubitril-valsartan 24 mg-26 mg oral tablet: 1 tab(s) orally 2 times a day  Toprol- mg oral tablet, extended release: 1 tab(s) orally once a day   Vitamin D2 50,000 intl units (1.25 mg) oral capsule: 1 cap(s) orally once a week

## 2020-04-22 NOTE — PROGRESS NOTE ADULT - SUBJECTIVE AND OBJECTIVE BOX
SUBJECTIVE:    Patient is a 70y old Female who presents with a chief complaint of Dyspnea (22 Apr 2020 08:29)    Currently admitted to medicine with the primary diagnosis of SOB (shortness of breath)     Today is hospital day 3d. This morning she is resting comfortably in bed and reports no new issues or overnight events.   INTERVAL EVENTS: CCTA negative for DEREJE pt going for cardioversion at 11, satting well on room air. Pt with persistent dry cough giving Robitussin/tessalon.    PAST MEDICAL & SURGICAL HISTORY  Vitamin D deficiency  Afib  Hypothyroid  Hypertension  S/P cholecystectomy      ALLERGIES:  No Known Allergies    MEDICATIONS:  STANDING MEDICATIONS  aMIOdarone    Tablet 400 milliGRAM(s) Oral every 12 hours  apixaban 5 milliGRAM(s) Oral every 12 hours  ergocalciferol 34011 Unit(s) Oral every week  furosemide   Injectable 40 milliGRAM(s) IV Push two times a day  levothyroxine 50 MICROGram(s) Oral daily  metoprolol tartrate 50 milliGRAM(s) Oral two times a day  multivitamin 1 Tablet(s) Oral daily  pantoprazole    Tablet 40 milliGRAM(s) Oral before breakfast  potassium chloride    Tablet ER 40 milliEquivalent(s) Oral every 4 hours    PRN MEDICATIONS  acetaminophen   Tablet .. 650 milliGRAM(s) Oral every 6 hours PRN  guaifenesin/dextromethorphan  Syrup 10 milliLiter(s) Oral every 4 hours PRN    VITALS:   T(F): 99.1  HR: 130  BP: 111/77  RR: 18  SpO2: 96%    LABS:                        12.8   8.93  )-----------( 277      ( 22 Apr 2020 05:04 )             37.7     04-22    144  |  103  |  16  ----------------------------<  101<H>  3.4<L>   |  29  |  0.9    Ca    9.0      22 Apr 2020 05:04    TPro  5.3<L>  /  Alb  3.5  /  TBili  0.5  /  DBili  x   /  AST  23  /  ALT  34  /  AlkPhos  50  04-22          Culture - Blood (collected 19 Apr 2020 16:20)  Source: .Blood Blood-Peripheral  Preliminary Report (21 Apr 2020 01:02):    No growth to date.    Culture - Blood (collected 19 Apr 2020 16:20)  Source: .Blood Blood-Peripheral  Preliminary Report (21 Apr 2020 01:02):    No growth to date.          RADIOLOGY:    PHYSICAL EXAM:  GEN: No acute distress/ dry cough  PULM/CHEST: mild crackles at bases  CVS: Regular rate and rhythm, S1-S2, no murmurs  ABD: Soft, non-tender, non-distended, +BS  EXT: No edema  NEURO: AAOx3    Lockhart Catheter:

## 2020-04-22 NOTE — CONSULT NOTE ADULT - SUBJECTIVE AND OBJECTIVE BOX
70 y/o F with h/o HTN, atrial fibrillation admitted with worsening SOB and fatigue. Patient reports being dealing with significant SOB even on minimal exertion since about January when she was diagnosed with atrial fibrillation and atypical pneumonia. She has been seen by several doctors for the pneumonia and has completed courses of Abx with no improvement. She has tested negative for COVID-19 twice.     She reports symptoms of frequent palpitations, ROPER, PND and orthopnea. No reports of pedal edema that she has noticed. On the present admission, she was found to have a reduced LVEF of ~ 35% from ~ 55% in January. A coronary CT showed normal coronary arteries and no DEREJE thrombus. She under DCCV for afib but this was unsuccessful.       PMH: AF, HTN, hypothyroidism     Social: Denies smoking, alcohol or drugs     FH: parents are alive in their 90s, no early CAD or SCD         Review of systems       Constitutional: Denies fever, chills   Eyes: No blurred vision   ENT: No ear discharge or sore throat   Respiratory: Denies cough, hemoptysis   Cardiac: +palpitations, denies syncope, chest pain   GI: Denies nausea, vomiting  Hem/onc: Denies bleeding   : Denies dysuria  MSK: No back pain  Neuro: No dizziness   Psychiatry: No anxiety            Physical exam       Vitals:  /75    HR  afib   RR 18 SpO2 96%     General: AAO x3, no acute distress   Eyes: Clear eyes   ENT: No ear discharge  Neck: Trachea is central, JVD +   Lungs: Mild bilateral crackles    Heart: Irregularly irregular heart sounds, no murmurs appreciated.    GI: Abdomen is soft, NT  Neuro: Normal strength  Psych: Calm affect   Integument: No skin bruises

## 2020-04-22 NOTE — PROGRESS NOTE ADULT - ASSESSMENT
A 69 years old female presented with worsening sob for the last two weeks. Also C/O palpitations.       1.	Ac. HFrEF  2.	B/L Pleural effusions.   3.	A-Fib with RVR  4.	HTN  5.	Hypothyroidism  6.	H/O recent Atypical PNA S/P treatment  7.	COVID-19 infection ruled out.          PLAN:     ·	Scheduled for CV today  ·	Will cont Lasix 40 mg ivp q 12h.   ·	Check i's and o's and daily wt  ·	Low salt diet and water restriction to 1.5 L/D  ·	ECHO reviewed. EF 35-40%. Small pericardial effusion.   ·	Cont Amiodarone 400 mg po q 12h  ·	Cardiology and EP F/U  ·	Cont Metoprolol 50 mg po q 12h.   ·	COVID result came back negative.   ·	D-dimer 449, CRP 0.47, Procal 0.02  ·	CE x 3 are negative  ·	TSH, T3, T4 are normal

## 2020-04-22 NOTE — PROGRESS NOTE ADULT - ASSESSMENT
69 year old female patient with past medical history of hypertension, Afib, hypothyroidism presented to the ED for dyspnea     INTERVAL EVENTS: covid negative, lasix 40mg q12 pt reports good urine output, asked nurse for strict is and os especially after lasix doses, pt satting 94% on room air.   restarting elliquis no tap  # Dyspnea secondary to CHF vs suspected COVID-19   SaO2 94 % room air, chest x ray showing bilateral pleural effusions and possible vascular congestion more pronounced on the right,  BNP 2392 trop negative x3  Lasix increased to 40mg Iv BID  Chest CT bilateral effusions with moderate pericardial effusion  O2 supplementation PRN to keep SaO2 between 90 and 92 %   Pulm consulted for possible thoracentesis hold off for now as pt responding well with IV lasix and a CHF picture    #Covid pending- not detected-non covid  Fibrinogen Assay: 411.0, Lactate Dehydrogenase, 361 D-Dimer Assay 449  ESR 4, CRP 0.47, procalcitonin 0.02, Ferritin 54, Creatine kinase 141  #stacey cough- robitussin/ tessalon  # Afib with RVR and resultant pulmonary vascular congestion, pt remains tachy 110s  s/p amio drip now on PO amio 400q12, going for cardioversion today, yesterday had CCTA ruling out DEREJE  Home dose of Bisoprolol changed to equivalent dose of Lopressor 50 mg BID  Monitor on Telemetry   Continue Lasix 40 mg Iv BID - pt with good urine outout  Strict Is and os and daily weights with Fluid restriction of 1500 mL/day   -Official echo ordered EF 35-40% 4-20 previous echo 1-20 EF between 60 and 65 %   #mild hypokalemia -oral repletion  # Hypothyroidism   Continue Synthroid 50 mcg daily    TSH and T4 wnl    # HTN   Off Losartan at home. Consider restarting if BP elevated     # Vitamin D deficiency   Continue Vitamin D2 50 000 units weekly     # Miscellaneous   DVT prophylaxis : Patient on Eliquis   GI prophylaxis : Protonix 40 mg daily   Activity : Ambulate as tolerated   Diet : DASH TLC   Code status : Full code

## 2020-04-22 NOTE — CONSULT NOTE ADULT - ASSESSMENT
69 y/o F with h/o HTN, AF, hypothyroidism admitted with worsening SOB. She was found to be in afib with RVR and new onset LV dysfunction with normal coronaries on cardiac CT. She is slightly overloaded on exam, hemodynamically stable.

## 2020-04-22 NOTE — PROGRESS NOTE ADULT - SUBJECTIVE AND OBJECTIVE BOX
ALVARO OLIVERA  70y Female    CHIEF COMPLAINT:    Patient is a 70y old  Female who presents with a chief complaint of Dyspnea (2020 08:35)      INTERVAL HPI/OVERNIGHT EVENTS:    Patient seen and examined. Feels better. Denies sob. Having palpitations. Still having dry cough but improving.     ROS: All other systems are negative.    Vital Signs:    T(F): 99.1 (20 @ 06:00), Max: 99.1 (20 @ 06:00)  HR: 130 (20 @ 06:00) (105 - 130)  BP: 111/77 (20 @ 06:00) (98/50 - 114/71)  RR: 18 (20 @ 07:50) (18 - 20)  SpO2: 96% (20 @ 07:50) (94% - 96%)  I&O's Summary    2020 07:01  -  2020 07:00  --------------------------------------------------------  IN: 810 mL / OUT: 1700 mL / NET: -890 mL      Daily     Daily Weight in k.2 (2020 06:00)  CAPILLARY BLOOD GLUCOSE          PHYSICAL EXAM:    GENERAL:  NAD  SKIN: No rashes or lesions  HENT: Atraumatic Normocephalic. PERRL. Moist membranes.  NECK: Supple, No JVD. No lymphadenopathy.  PULMONARY: BS are decrease in the bases B/L. No wheezing. No rales  CVS: Normal S1, S2. Rate and Rhythm are IRIR. No murmurs.  ABDOMEN/GI: Soft, Nontender, Nondistended; BS present  EXTREMITIES: Peripheral pulses intact. No edema B/L LE.  NEUROLOGIC:  No motor or sensory deficit.  PSYCH: Alert & oriented x 3    Consultant(s) Notes Reviewed:  [x ] YES  [ ] NO  Care Discussed with Consultants/Other Providers [ x] YES  [ ] NO    EKG reviewed  Telemetry reviewed    LABS:                        12.8   8.93  )-----------( 277      ( 2020 05:04 )             37.7         144  |  103  |  16  ----------------------------<  101<H>  3.4<L>   |  29  |  0.9    Ca    9.0      2020 05:04    TPro  5.3<L>  /  Alb  3.5  /  TBili  0.5  /  DBili  x   /  AST  23  /  ALT  34  /  AlkPhos  50        Serum Pro-Brain Natriuretic Peptide: 2392 pg/mL (20 @ 16:20)    Trop <0.01, CKMB --, CK --, 20 @ 05:20  Trop <0.01, CKMB --, , 20 @ 20:00  Trop <0.01, CKMB --, , 20 @ 16:20      Culture - Blood (collected 2020 16:20)  Source: .Blood Blood-Peripheral  Preliminary Report (2020 01:02):    No growth to date.    Culture - Blood (collected 2020 16:20)  Source: .Blood Blood-Peripheral  Preliminary Report (2020 01:02):    No growth to date.        RADIOLOGY & ADDITIONAL TESTS:      Imaging or report Personally Reviewed:  [ ] YES  [ ] NO    Medications:  Standing  aMIOdarone    Tablet 400 milliGRAM(s) Oral every 12 hours  apixaban 5 milliGRAM(s) Oral every 12 hours  ergocalciferol 36081 Unit(s) Oral every week  furosemide   Injectable 40 milliGRAM(s) IV Push two times a day  levothyroxine 50 MICROGram(s) Oral daily  metoprolol tartrate 50 milliGRAM(s) Oral two times a day  multivitamin 1 Tablet(s) Oral daily  pantoprazole    Tablet 40 milliGRAM(s) Oral before breakfast  potassium chloride    Tablet ER 40 milliEquivalent(s) Oral every 4 hours    PRN Meds  acetaminophen   Tablet .. 650 milliGRAM(s) Oral every 6 hours PRN  benzonatate 100 milliGRAM(s) Oral three times a day PRN  guaifenesin/dextromethorphan  Syrup 10 milliLiter(s) Oral every 4 hours PRN      Case discussed with resident    Care discussed with pt/family

## 2020-04-22 NOTE — DISCHARGE NOTE PROVIDER - CARE PROVIDER_API CALL
Ashvin Mahajan; STORM)  Cardiac Electrophysiology  [address] [place] [address]  Phone: [phone]  Fax: [phone]  Follow Up Time:     Krzysztof Cadet)  Cardiovascular Disease; Internal Medicine; Interventional Cardiology; Nuclear Cardiology  [address] [place] [address], [place] [address]  Phone: [phone]  Fax: [phone]  Follow Up Time:

## 2020-04-22 NOTE — PROGRESS NOTE ADULT - SUBJECTIVE AND OBJECTIVE BOX
INTERVAL HPI/OVERNIGHT EVENTS: Still -110s, resting comfortably.    < from: Transthoracic Echocardiogram (04.20.20 @ 10:09) >  Summary:   1. Left ventricular ejection fraction, by visual estimation, is 35 to 40%.   2. Grossly moderately decreased global left ventricular systolic function.   3. LA volume Index is 41.0 ml/m² ml/m2.   4. Small pericardial effusion.      MEDICATIONS  (STANDING):  aMIOdarone    Tablet 400 milliGRAM(s) Oral every 12 hours  apixaban 5 milliGRAM(s) Oral every 12 hours  ergocalciferol 97191 Unit(s) Oral every week  furosemide   Injectable 40 milliGRAM(s) IV Push two times a day  levothyroxine 50 MICROGram(s) Oral daily  metoprolol tartrate 50 milliGRAM(s) Oral two times a day  metoprolol tartrate Injectable 5 milliGRAM(s) IV Push once  metoprolol tartrate Injectable 5 milliGRAM(s) IV Push once  multivitamin 1 Tablet(s) Oral daily  pantoprazole    Tablet 40 milliGRAM(s) Oral before breakfast    MEDICATIONS  (PRN):  acetaminophen   Tablet .. 650 milliGRAM(s) Oral every 6 hours PRN Temp greater or equal to 38C (100.4F)  guaifenesin/dextromethorphan  Syrup 10 milliLiter(s) Oral every 4 hours PRN Cough      Allergies  No Known Allergies    REVIEW OF SYSTEMS: [x ] A ten-point review of systems was otherwise negative except as noted.      Vital Signs Last 24 Hrs  T(C): 36.8 (21 Apr 2020 13:05), Max: 37 (20 Apr 2020 20:42)  T(F): 98.2 (21 Apr 2020 13:05), Max: 98.6 (20 Apr 2020 20:42)  HR: 110 (21 Apr 2020 13:05) (101 - 124)  BP: 108/78 (21 Apr 2020 13:05) (94/58 - 125/70)  BP(mean): --  RR: 20 (21 Apr 2020 13:05) (14 - 20)  SpO2: 91% (21 Apr 2020 10:45) (91% - 95%)    04-20-20 @ 07:01  -  04-21-20 @ 07:00  --------------------------------------------------------  IN: 1175.6 mL / OUT: 1475 mL / NET: -299.4 mL    04-21-20 @ 07:01  -  04-21-20 @ 14:00  --------------------------------------------------------  IN: 310 mL / OUT: 800 mL / NET: -490 mL        PHYSICAL EXAM:  GENERAL: appears dyspnic while talking, well nourished, and hydrated.  HEAD:  Atraumatic, Normocephalic  EYES: EOMI, PERRLA, conjunctiva and sclera clear  NECK: Supple and normal thyroid.  No JVD or carotid bruit.  Carotid pulse is 2+ bilaterally.  HEART: Irregular tachycardic; No murmurs, rubs, or gallops.  PULMONARY: diffuse rhonchi, bilateral rales, diminished breath sounds at the bases.   ABDOMEN: Soft, Nontender, Nondistended; Bowel sounds present  EXTREMITIES:  2+ Peripheral Pulses, No clubbing, cyanosis, or 1+ edema  NEUROLOGICAL: Grossly nonfocal    LABS:                        13.8   10.11 )-----------( 329      ( 21 Apr 2020 07:35 )             41.7     04-21    142  |  100  |  12  ----------------------------<  117<H>  3.5   |  28  |  0.8    Ca    9.7      21 Apr 2020 07:35  Mg     2.0     04-20    TPro  6.4  /  Alb  4.2  /  TBili  0.8  /  DBili  x   /  AST  30  /  ALT  46<H>  /  AlkPhos  64  04-21    PT/INR - ( 19 Apr 2020 16:20 )   PT: 16.40 sec;   INR: 1.43 ratio         PTT - ( 19 Apr 2020 16:20 )  PTT:32.3 sec          RADIOLOGY & ADDITIONAL TESTS:

## 2020-04-22 NOTE — PROCEDURE NOTE - ADDITIONAL PROCEDURE DETAILS
Uncessesful DCCV  - cont amio 400 mg q12  - increased metoprolol 50 q8  - cont AC  - will consider another DCCV once more amio is loded

## 2020-04-22 NOTE — CONSULT NOTE ADULT - PROBLEM SELECTOR RECOMMENDATION 3
Rate better controlled   Increase betablocker as above   Continue with amiodarone as above   EP following Rate better controlled but still not ideal   No DEREJE thrombus on cardiac CT   Failed DCCV today   Betablocker as above   Continue with amiodarone as above   EP following

## 2020-04-23 DIAGNOSIS — I42.8 OTHER CARDIOMYOPATHIES: ICD-10-CM

## 2020-04-23 DIAGNOSIS — I48.91 UNSPECIFIED ATRIAL FIBRILLATION: ICD-10-CM

## 2020-04-23 DIAGNOSIS — I50.23 ACUTE ON CHRONIC SYSTOLIC (CONGESTIVE) HEART FAILURE: ICD-10-CM

## 2020-04-23 DIAGNOSIS — E03.9 HYPOTHYROIDISM, UNSPECIFIED: ICD-10-CM

## 2020-04-23 LAB
ALBUMIN SERPL ELPH-MCNC: 3.9 G/DL — SIGNIFICANT CHANGE UP (ref 3.5–5.2)
ALP SERPL-CCNC: 52 U/L — SIGNIFICANT CHANGE UP (ref 30–115)
ALT FLD-CCNC: 32 U/L — SIGNIFICANT CHANGE UP (ref 0–41)
ANION GAP SERPL CALC-SCNC: 13 MMOL/L — SIGNIFICANT CHANGE UP (ref 7–14)
AST SERPL-CCNC: 30 U/L — SIGNIFICANT CHANGE UP (ref 0–41)
BILIRUB SERPL-MCNC: 0.6 MG/DL — SIGNIFICANT CHANGE UP (ref 0.2–1.2)
BUN SERPL-MCNC: 19 MG/DL — SIGNIFICANT CHANGE UP (ref 10–20)
CALCIUM SERPL-MCNC: 9.1 MG/DL — SIGNIFICANT CHANGE UP (ref 8.5–10.1)
CHLORIDE SERPL-SCNC: 103 MMOL/L — SIGNIFICANT CHANGE UP (ref 98–110)
CO2 SERPL-SCNC: 28 MMOL/L — SIGNIFICANT CHANGE UP (ref 17–32)
CREAT SERPL-MCNC: 1 MG/DL — SIGNIFICANT CHANGE UP (ref 0.7–1.5)
GLUCOSE SERPL-MCNC: 122 MG/DL — HIGH (ref 70–99)
HCT VFR BLD CALC: 41.2 % — SIGNIFICANT CHANGE UP (ref 37–47)
HGB BLD-MCNC: 13.7 G/DL — SIGNIFICANT CHANGE UP (ref 12–16)
MAGNESIUM SERPL-MCNC: 2 MG/DL — SIGNIFICANT CHANGE UP (ref 1.8–2.4)
MCHC RBC-ENTMCNC: 31.1 PG — HIGH (ref 27–31)
MCHC RBC-ENTMCNC: 33.3 G/DL — SIGNIFICANT CHANGE UP (ref 32–37)
MCV RBC AUTO: 93.4 FL — SIGNIFICANT CHANGE UP (ref 81–99)
NRBC # BLD: 0 /100 WBCS — SIGNIFICANT CHANGE UP (ref 0–0)
PLATELET # BLD AUTO: 278 K/UL — SIGNIFICANT CHANGE UP (ref 130–400)
POTASSIUM SERPL-MCNC: 4 MMOL/L — SIGNIFICANT CHANGE UP (ref 3.5–5)
POTASSIUM SERPL-SCNC: 4 MMOL/L — SIGNIFICANT CHANGE UP (ref 3.5–5)
PROT SERPL-MCNC: 6.2 G/DL — SIGNIFICANT CHANGE UP (ref 6–8)
RBC # BLD: 4.41 M/UL — SIGNIFICANT CHANGE UP (ref 4.2–5.4)
RBC # FLD: 13.5 % — SIGNIFICANT CHANGE UP (ref 11.5–14.5)
SODIUM SERPL-SCNC: 144 MMOL/L — SIGNIFICANT CHANGE UP (ref 135–146)
WBC # BLD: 9.12 K/UL — SIGNIFICANT CHANGE UP (ref 4.8–10.8)
WBC # FLD AUTO: 9.12 K/UL — SIGNIFICANT CHANGE UP (ref 4.8–10.8)

## 2020-04-23 PROCEDURE — 99233 SBSQ HOSP IP/OBS HIGH 50: CPT

## 2020-04-23 PROCEDURE — 71045 X-RAY EXAM CHEST 1 VIEW: CPT | Mod: 26

## 2020-04-23 RX ORDER — SACUBITRIL AND VALSARTAN 24; 26 MG/1; MG/1
1 TABLET, FILM COATED ORAL
Refills: 0 | Status: DISCONTINUED | OUTPATIENT
Start: 2020-04-23 | End: 2020-04-24

## 2020-04-23 RX ORDER — METOPROLOL TARTRATE 50 MG
50 TABLET ORAL EVERY 8 HOURS
Refills: 0 | Status: DISCONTINUED | OUTPATIENT
Start: 2020-04-23 | End: 2020-04-24

## 2020-04-23 RX ORDER — METOPROLOL TARTRATE 50 MG
50 TABLET ORAL EVERY 6 HOURS
Refills: 0 | Status: DISCONTINUED | OUTPATIENT
Start: 2020-04-23 | End: 2020-04-23

## 2020-04-23 RX ADMIN — APIXABAN 5 MILLIGRAM(S): 2.5 TABLET, FILM COATED ORAL at 17:56

## 2020-04-23 RX ADMIN — Medication 40 MILLIGRAM(S): at 06:00

## 2020-04-23 RX ADMIN — APIXABAN 5 MILLIGRAM(S): 2.5 TABLET, FILM COATED ORAL at 06:00

## 2020-04-23 RX ADMIN — Medication 50 MILLIGRAM(S): at 21:36

## 2020-04-23 RX ADMIN — SACUBITRIL AND VALSARTAN 1 TABLET(S): 24; 26 TABLET, FILM COATED ORAL at 17:56

## 2020-04-23 RX ADMIN — Medication 50 MILLIGRAM(S): at 06:00

## 2020-04-23 RX ADMIN — Medication 50 MILLIGRAM(S): at 14:51

## 2020-04-23 RX ADMIN — Medication 40 MILLIGRAM(S): at 17:56

## 2020-04-23 RX ADMIN — Medication 1 APPLICATION(S): at 18:05

## 2020-04-23 RX ADMIN — AMIODARONE HYDROCHLORIDE 400 MILLIGRAM(S): 400 TABLET ORAL at 06:00

## 2020-04-23 RX ADMIN — PANTOPRAZOLE SODIUM 40 MILLIGRAM(S): 20 TABLET, DELAYED RELEASE ORAL at 06:00

## 2020-04-23 RX ADMIN — AMIODARONE HYDROCHLORIDE 400 MILLIGRAM(S): 400 TABLET ORAL at 17:56

## 2020-04-23 RX ADMIN — Medication 1 TABLET(S): at 11:48

## 2020-04-23 RX ADMIN — Medication 50 MICROGRAM(S): at 06:00

## 2020-04-23 NOTE — PROGRESS NOTE ADULT - SUBJECTIVE AND OBJECTIVE BOX
SUBJECTIVE:    Patient is a 70y old Female who presents with a chief complaint of Dyspnea (22 Apr 2020 17:20)    Currently admitted to medicine with the primary diagnosis of SOB (shortness of breath)     Today is hospital day 4d. This morning she is resting comfortably in bed and reports no new issues or overnight events.     INTERVAL EVENTS:     PAST MEDICAL & SURGICAL HISTORY  Vitamin D deficiency  Afib  Hypothyroid  Hypertension  S/P cholecystectomy      ALLERGIES:  No Known Allergies    MEDICATIONS:  STANDING MEDICATIONS  aMIOdarone    Tablet 400 milliGRAM(s) Oral every 12 hours  apixaban 5 milliGRAM(s) Oral every 12 hours  ergocalciferol 74368 Unit(s) Oral every week  furosemide   Injectable 40 milliGRAM(s) IV Push two times a day  levothyroxine 50 MICROGram(s) Oral daily  metoprolol tartrate 50 milliGRAM(s) Oral every 6 hours  multivitamin 1 Tablet(s) Oral daily  pantoprazole    Tablet 40 milliGRAM(s) Oral before breakfast  sacubitril 24 mG/valsartan 26 mG 1 Tablet(s) Oral two times a day    PRN MEDICATIONS  acetaminophen   Tablet .. 650 milliGRAM(s) Oral every 6 hours PRN  benzonatate 100 milliGRAM(s) Oral three times a day PRN  guaifenesin/dextromethorphan  Syrup 10 milliLiter(s) Oral every 4 hours PRN    VITALS:   T(F): 98  HR: 99  BP: 111/75  RR: 18  SpO2: 97%    LABS:                        13.7   9.12  )-----------( 278      ( 23 Apr 2020 05:49 )             41.2     04-23    144  |  103  |  19  ----------------------------<  122<H>  4.0   |  28  |  1.0    Ca    9.1      23 Apr 2020 05:49  Mg     2.0     04-23    TPro  6.2  /  Alb  3.9  /  TBili  0.6  /  DBili  x   /  AST  30  /  ALT  32  /  AlkPhos  52  04-23                  RADIOLOGY:    PHYSICAL EXAM:  GEN: No acute distress  PULM/CHEST: Clear to auscultation bilaterally, no rales, rhonchi or wheezes   CVS: Regular rate and rhythm, S1-S2, no murmurs  ABD: Soft, non-tender, non-distended, +BS  EXT: No edema  NEURO: AAOx3    Lockhart Catheter: SUBJECTIVE:    Patient is a 70y old Female who presents with a chief complaint of Dyspnea (22 Apr 2020 17:20)    Currently admitted to medicine with the primary diagnosis of SOB (shortness of breath)     Today is hospital day 4d. This morning she is resting comfortably in bed and reports no new issues or overnight events.     INTERVAL EVENTS: cardioversion failed 5 times yesterday, metoprolol increased this am to 50q6, entresto started this am, repeat chest xray this am improved with decreased size of effusions pending official read. will c/w IV lasix 24 more hours likely switch tomorrow     PAST MEDICAL & SURGICAL HISTORY  Vitamin D deficiency  Afib  Hypothyroid  Hypertension  S/P cholecystectomy      ALLERGIES:  No Known Allergies    MEDICATIONS:  STANDING MEDICATIONS  aMIOdarone    Tablet 400 milliGRAM(s) Oral every 12 hours  apixaban 5 milliGRAM(s) Oral every 12 hours  ergocalciferol 67571 Unit(s) Oral every week  furosemide   Injectable 40 milliGRAM(s) IV Push two times a day  levothyroxine 50 MICROGram(s) Oral daily  metoprolol tartrate 50 milliGRAM(s) Oral every 6 hours  multivitamin 1 Tablet(s) Oral daily  pantoprazole    Tablet 40 milliGRAM(s) Oral before breakfast  sacubitril 24 mG/valsartan 26 mG 1 Tablet(s) Oral two times a day    PRN MEDICATIONS  acetaminophen   Tablet .. 650 milliGRAM(s) Oral every 6 hours PRN  benzonatate 100 milliGRAM(s) Oral three times a day PRN  guaifenesin/dextromethorphan  Syrup 10 milliLiter(s) Oral every 4 hours PRN    VITALS:   T(F): 98  HR: 99  BP: 111/75  RR: 18  SpO2: 97%    LABS:                        13.7   9.12  )-----------( 278      ( 23 Apr 2020 05:49 )             41.2     04-23    144  |  103  |  19  ----------------------------<  122<H>  4.0   |  28  |  1.0    Ca    9.1      23 Apr 2020 05:49  Mg     2.0     04-23    TPro  6.2  /  Alb  3.9  /  TBili  0.6  /  DBili  x   /  AST  30  /  ALT  32  /  AlkPhos  52  04-23                  RADIOLOGY:    PHYSICAL EXAM:  GEN: No acute distress satting 97% on room air  PULM/CHEST: soft crackles at bases, nontender non erythematous rash center chest (from site of yesterdays cardioversion)  CVS: tachycardic irregular  ABD: Soft, non-tender, non-distended, +BS  EXT: No edema  NEURO: AAOx3    Lockhart Catheter: SUBJECTIVE:    Patient is a 70y old Female who presents with a chief complaint of Dyspnea (22 Apr 2020 17:20)    Currently admitted to medicine with the primary diagnosis of SOB (shortness of breath)     Today is hospital day 4d. This morning she is resting comfortably in bed and reports no new issues or overnight events.     INTERVAL EVENTS: cardioversion failed 5 times yesterday will give silvadene cream for mild burn from attempted cardioversion, metoprolol increased this am to 50q8 yesterday, entresto started this am, repeat chest xray this am improved with decreased size of effusions pending official read. will c/w IV lasix 24 more hours likely switch tomorrow     PAST MEDICAL & SURGICAL HISTORY  Vitamin D deficiency  Afib  Hypothyroid  Hypertension  S/P cholecystectomy      ALLERGIES:  No Known Allergies    MEDICATIONS:  STANDING MEDICATIONS  aMIOdarone    Tablet 400 milliGRAM(s) Oral every 12 hours  apixaban 5 milliGRAM(s) Oral every 12 hours  ergocalciferol 61770 Unit(s) Oral every week  furosemide   Injectable 40 milliGRAM(s) IV Push two times a day  levothyroxine 50 MICROGram(s) Oral daily  metoprolol tartrate 50 milliGRAM(s) Oral every 6 hours  multivitamin 1 Tablet(s) Oral daily  pantoprazole    Tablet 40 milliGRAM(s) Oral before breakfast  sacubitril 24 mG/valsartan 26 mG 1 Tablet(s) Oral two times a day    PRN MEDICATIONS  acetaminophen   Tablet .. 650 milliGRAM(s) Oral every 6 hours PRN  benzonatate 100 milliGRAM(s) Oral three times a day PRN  guaifenesin/dextromethorphan  Syrup 10 milliLiter(s) Oral every 4 hours PRN    VITALS:   T(F): 98  HR: 99  BP: 111/75  RR: 18  SpO2: 97%    LABS:                        13.7   9.12  )-----------( 278      ( 23 Apr 2020 05:49 )             41.2     04-23    144  |  103  |  19  ----------------------------<  122<H>  4.0   |  28  |  1.0    Ca    9.1      23 Apr 2020 05:49  Mg     2.0     04-23    TPro  6.2  /  Alb  3.9  /  TBili  0.6  /  DBili  x   /  AST  30  /  ALT  32  /  AlkPhos  52  04-23                  RADIOLOGY:    PHYSICAL EXAM:  GEN: No acute distress satting 97% on room air  PULM/CHEST: soft crackles at bases, nontender non erythematous rash center chest (from site of yesterdays cardioversion)  CVS: tachycardic irregular  ABD: Soft, non-tender, non-distended, +BS  EXT: No edema  NEURO: AAOx3    Lockhart Catheter:

## 2020-04-23 NOTE — PROGRESS NOTE ADULT - SUBJECTIVE AND OBJECTIVE BOX
ALVARO OLIVERA  70y Female    CHIEF COMPLAINT:    Patient is a 70y old  Female who presents with a chief complaint of Dyspnea (2020 08:30)      INTERVAL HPI/OVERNIGHT EVENTS:    Patient seen and examined.    ROS: All other systems are negative.    Vital Signs:    T(F): 98 (20 @ 05:25), Max: 99 (20 @ 13:54)  HR: 99 (20 @ 05:25) (90 - 120)  BP: 111/75 (20 @ 05:25) (105/52 - 120/88)  RR: 18 (20 @ 05:25) (16 - 18)  SpO2: 97% (20 @ 22:08) (97% - 97%)  I&O's Summary    2020 07:01  -  2020 07:00  --------------------------------------------------------  IN: 240 mL / OUT: 300 mL / NET: -60 mL      Daily Height in cm: 154.94 (2020 12:49)    Daily Weight in k (2020 05:25)  CAPILLARY BLOOD GLUCOSE          PHYSICAL EXAM:    GENERAL:  NAD  SKIN: No rashes or lesions  HENT: Atrumatic. Normocephalic. PERRL. Moist membranes.  NECK: Supple, No JVD. No lymphadenopathy.  PULMONARY: CTA B/L. No wheezing. No rales  CVS: Normal S1, S2. Rate and Rythm are regular. No murmurs.  ABDOMEN/GI: Soft, Nontender, Nondistended; BS present  EXTREMITIES: Peripheral pulses intact. No edema B/L LE.  NEUROLOGIC:  No motor or sensory deficit.  PSYCH: Alert & oriented x 3    Consultant(s) Notes Reviewed:  [x ] YES  [ ] NO  Care Discussed with Consultants/Other Providers [ x] YES  [ ] NO    EKG reviewed  Telemetry reviewed    LABS:                        13.7   9.12  )-----------( 278      ( 2020 05:49 )             41.2         144  |  103  |  19  ----------------------------<  122<H>  4.0   |  28  |  1.0    Ca    9.1      2020 05:49  Mg     2.0         TPro  6.2  /  Alb  3.9  /  TBili  0.6  /  DBili  x   /  AST  30  /  ALT  32  /  AlkPhos  52        Serum Pro-Brain Natriuretic Peptide: 2392 pg/mL (20 @ 16:20)          RADIOLOGY & ADDITIONAL TESTS:      Imaging or report Personally Reviewed:  [ ] YES  [ ] NO    Medications:  Standing  aMIOdarone    Tablet 400 milliGRAM(s) Oral every 12 hours  apixaban 5 milliGRAM(s) Oral every 12 hours  ergocalciferol 72490 Unit(s) Oral every week  furosemide   Injectable 40 milliGRAM(s) IV Push two times a day  levothyroxine 50 MICROGram(s) Oral daily  metoprolol tartrate 50 milliGRAM(s) Oral every 6 hours  multivitamin 1 Tablet(s) Oral daily  pantoprazole    Tablet 40 milliGRAM(s) Oral before breakfast  sacubitril 24 mG/valsartan 26 mG 1 Tablet(s) Oral two times a day    PRN Meds  acetaminophen   Tablet .. 650 milliGRAM(s) Oral every 6 hours PRN  benzonatate 100 milliGRAM(s) Oral three times a day PRN  guaifenesin/dextromethorphan  Syrup 10 milliLiter(s) Oral every 4 hours PRN      Case discussed with resident    Care discussed with pt/family ALVARO OLIVERA  70y Female    CHIEF COMPLAINT:    Patient is a 70y old  Female who presents with a chief complaint of Dyspnea (2020 08:30)      INTERVAL HPI/OVERNIGHT EVENTS:    Patient seen and examined. Still tachycardiac on tele. C/O on & off palpitations. Having dry cough but improving. No fever.     ROS: All other systems are negative.    Vital Signs:    T(F): 98 (20 @ 05:25), Max: 99 (20 @ 13:54)  HR: 99 (20 @ 05:25) (90 - 120)  BP: 111/75 (20 @ 05:25) (105/52 - 120/88)  RR: 18 (20 @ 05:25) (16 - 18)  SpO2: 97% (20 @ 22:08) (97% - 97%)  I&O's Summary    2020 07:01  -  2020 07:00  --------------------------------------------------------  IN: 240 mL / OUT: 300 mL / NET: -60 mL      Daily Height in cm: 154.94 (2020 12:49)    Daily Weight in k (2020 05:25)  CAPILLARY BLOOD GLUCOSE          PHYSICAL EXAM:    GENERAL:  NAD  SKIN: No rashes or lesions  HENT: Atraumatic Normocephalic. PERRL. Moist membranes.  NECK: Supple, No JVD. No lymphadenopathy.  PULMONARY: BS are decreased in the bases B/L. No wheezing. No rales  CVS: Normal S1, S2. Rate and Rhythm are IRIR. No murmurs.  ABDOMEN/GI: Soft, Nontender, Nondistended; BS present  EXTREMITIES: Peripheral pulses intact. No edema B/L LE.  NEUROLOGIC:  No motor or sensory deficit.  PSYCH: Alert & oriented x 3    Consultant(s) Notes Reviewed:  [x ] YES  [ ] NO  Care Discussed with Consultants/Other Providers [ x] YES  [ ] NO    EKG reviewed  Telemetry reviewed    LABS:                        13.7   9.12  )-----------( 278      ( 2020 05:49 )             41.2         144  |  103  |  19  ----------------------------<  122<H>  4.0   |  28  |  1.0    Ca    9.1      2020 05:49  Mg     2.0         TPro  6.2  /  Alb  3.9  /  TBili  0.6  /  DBili  x   /  AST  30  /  ALT  32  /  AlkPhos  52        Serum Pro-Brain Natriuretic Peptide: 2392 pg/mL (20 @ 16:20)          RADIOLOGY & ADDITIONAL TESTS:      Imaging or report Personally Reviewed:  [ ] YES  [ ] NO    Medications:  Standing  aMIOdarone    Tablet 400 milliGRAM(s) Oral every 12 hours  apixaban 5 milliGRAM(s) Oral every 12 hours  ergocalciferol 80141 Unit(s) Oral every week  furosemide   Injectable 40 milliGRAM(s) IV Push two times a day  levothyroxine 50 MICROGram(s) Oral daily  metoprolol tartrate 50 milliGRAM(s) Oral every 6 hours  multivitamin 1 Tablet(s) Oral daily  pantoprazole    Tablet 40 milliGRAM(s) Oral before breakfast  sacubitril 24 mG/valsartan 26 mG 1 Tablet(s) Oral two times a day    PRN Meds  acetaminophen   Tablet .. 650 milliGRAM(s) Oral every 6 hours PRN  benzonatate 100 milliGRAM(s) Oral three times a day PRN  guaifenesin/dextromethorphan  Syrup 10 milliLiter(s) Oral every 4 hours PRN      Case discussed with resident    Care discussed with pt/family

## 2020-04-23 NOTE — PROGRESS NOTE ADULT - ASSESSMENT
A 69 years old female presented with worsening sob for the last two weeks. Also C/O palpitations.       1.	Ac. HFrEF  2.	B/L Pleural effusions.   3.	A-Fib with RVR  4.	HTN  5.	Hypothyroidism  6.	H/O recent Atypical PNA S/P treatment  7.	COVID-19 infection ruled out.          PLAN:     ·	Scheduled for CV today  ·	Will cont Lasix 40 mg ivp q 12h.   ·	Check i's and o's and daily wt  ·	Low salt diet and water restriction to 1.5 L/D  ·	ECHO reviewed. EF 35-40%. Small pericardial effusion.   ·	Cont Amiodarone 400 mg po q 12h  ·	Cardiology and EP F/U  ·	Cont Metoprolol 50 mg po q 12h.   ·	COVID result came back negative.   ·	D-dimer 449, CRP 0.47, Procal 0.02  ·	CE x 3 are negative  ·	TSH, T3, T4 are normal A 69 years old female presented with worsening sob for the last two weeks. Also C/O palpitations.       1.	Ac. HFrEF  2.	B/L Pleural effusions.   3.	A-Fib with RVR  4.	HTN  5.	Hypothyroidism  6.	H/O recent Atypical PNA S/P treatment  7.	COVID-19 infection ruled out.          PLAN:     ·	Failed CV yesterday. EP recommended to cont high dose of Amiodarone. Will try CV again.   ·	Care D/W the cardiology. Heart failure specialist catrachito noted. Started her on Entresto 24/26 mg  po twice a day. Watch for the BP as the medication causes hypotension.   ·	Will cont Lasix 40 mg ivp q 12h.   ·	Check i's and o's and daily wt  ·	Low salt diet and water restriction to 1.5 L/D  ·	ECHO reviewed. EF 35-40%. Small pericardial effusion.   ·	Cont Amiodarone 400 mg po q 12h  ·	Cont Metoprolol 50 mg po q 8h.   ·	COVID result came back negative.   ·	D-dimer 449, CRP 0.47, Procal 0.02  ·	CE x 3 are negative  ·	TSH, T3, T4 are normal

## 2020-04-23 NOTE — PROGRESS NOTE ADULT - ASSESSMENT
A.fib, s/p failed DCCV  CHF - likely tach induced - better  Hypothyroidism  HTN      cont present care  repeat x ray reviewed- much better, cont with LASIX IV for now  would try to increase bb as bp tolerates, if HR high 90/low 100s -would d/c home on med rx and repeat cv as outpt  agree with starting entresto  cont a/c and amiodarone  supplement K/Mg as needed  will follow

## 2020-04-23 NOTE — PROGRESS NOTE ADULT - ASSESSMENT
69 year old female patient with past medical history of hypertension, Afib, hypothyroidism presented to the ED for dyspnea     INTERVAL EVENTS: cardioversion failed 5 times yesterday, metoprolol increased this am to 50q6, entresto started this am, repeat chest xray this am improved with decreased size of effusions pending official read. will c/w IV lasix 24 more hours likely switch tomorrow     # Dyspnea secondary to CHF SaO2 97 % room air, -much improved clinically- pt in no distress  covid negative ruled out  chest x ray showing bilateral pleural effusions repeat xray this am decreased effsuions pending official read  BNP 2392 trop negative x3  Lasix increased to 40mg Iv BID- c/w lasix IV one more day  Chest CT bilateral effusions with moderate pericardial effusion  Pulm consulted for possible thoracentesis hold off for now as pt responding well with IV lasix and a CHF picture  -entresto started    #dry cough- robitussin/ tessalon    # Afib with RVR and resultant pulmonary vascular congestion, pt remains tachy 100s  c/w po amio   cardioversion yesterday unsuccesful 5 times  had CCTA ruling out DEREJE  increased metoprolol to 50q6  entresto started  Continue Lasix 40 mg Iv BID - pt with good urine outout  Strict Is and os and daily weights with Fluid restriction of 1500 mL/day   -Official echo ordered EF 35-40% 4-20 previous echo 1-20 EF between 60 and 65 %     #hx of Hypothyroidism   Continue Synthroid 50 mcg daily    TSH and T4 wnl    # HTN   Off Losartan at home. Consider restarting if BP elevated     # Vitamin D deficiency   Continue Vitamin D2 50 000 units weekly     # Miscellaneous   DVT prophylaxis : Patient on Eliquis   GI prophylaxis : Protonix 40 mg daily   Activity : Ambulate as tolerated   Diet : DASH TLC   Code status : Full code 69 year old female patient with past medical history of hypertension, Afib, hypothyroidism presented to the ED for dyspnea     INTERVAL EVENTS: cardioversion failed 5times will give silvadene cream for mild burn from attempted cardioversion, metoprolol increased this am to 50q8 entresto started this am, repeat chest xray this am improved with decreased size of effusions pending official read. will c/w IV lasix 24 more hours likely switch tomorrow     # Dyspnea secondary to CHF SaO2 97 % room air, -much improved clinically- pt in no distress  covid negative ruled out  chest x ray showing bilateral pleural effusions repeat xray this am decreased effsuions pending official read  BNP 2392 trop negative x3  Lasix increased to 40mg Iv BID- c/w lasix IV one more day  Chest CT bilateral effusions with moderate pericardial effusion  Pulm consulted for possible thoracentesis hold off for now as pt responding well with IV lasix and a CHF picture  -entresto started    #dry cough- robitussin/ tessalon    # Afib with RVR and resultant pulmonary vascular congestion, pt remains tachy 100s  c/w po amio   cardioversion yesterday unsuccesful 5 times  had CCTA ruling out DEREJE  increased metoprolol to 50q8 on 4-22  entresto started  Continue Lasix 40 mg Iv BID - pt with good urine outout  Strict Is and os and daily weights with Fluid restriction of 1500 mL/day   -Official echo ordered EF 35-40% 4-20 previous echo 1-20 EF between 60 and 65 %     #hx of Hypothyroidism   Continue Synthroid 50 mcg daily    TSH and T4 wnl    # HTN   Off Losartan at home. Consider restarting if BP elevated     # Vitamin D deficiency   Continue Vitamin D2 50 000 units weekly     # Miscellaneous   DVT prophylaxis : Patient on Eliquis   GI prophylaxis : Protonix 40 mg daily   Activity : Ambulate as tolerated   Diet : DASH TLC   Code status : Full code

## 2020-04-23 NOTE — PROGRESS NOTE ADULT - SUBJECTIVE AND OBJECTIVE BOX
INTERVAL HISTORY: No overnight events.  hr still fast, but better, less sob  failed multiple attempts at CV  	  MEDICATIONS:  acetaminophen   Tablet .. 650 milliGRAM(s) Oral every 6 hours PRN  aMIOdarone    Tablet 400 milliGRAM(s) Oral every 12 hours  apixaban 5 milliGRAM(s) Oral every 12 hours  benzonatate 100 milliGRAM(s) Oral three times a day PRN  ergocalciferol 44261 Unit(s) Oral every week  furosemide   Injectable 40 milliGRAM(s) IV Push two times a day  guaifenesin/dextromethorphan  Syrup 10 milliLiter(s) Oral every 4 hours PRN  levothyroxine 50 MICROGram(s) Oral daily  metoprolol tartrate 50 milliGRAM(s) Oral every 8 hours  multivitamin 1 Tablet(s) Oral daily  pantoprazole    Tablet 40 milliGRAM(s) Oral before breakfast  sacubitril 24 mG/valsartan 26 mG 1 Tablet(s) Oral two times a day  silver sulfADIAZINE 1% Cream 1 Application(s) Topical two times a day      REVIEW OF SYSTEMS:  as above, otherwise unrem    PHYSICAL EXAM:  T(C): 36.7 (04-23-20 @ 05:25), Max: 37.2 (04-22-20 @ 13:54)  HR: 99 (04-23-20 @ 05:25) (90 - 120)  BP: 111/75 (04-23-20 @ 05:25) (105/52 - 120/88)  RR: 18 (04-23-20 @ 05:25) (16 - 18)  SpO2: 97% (04-22-20 @ 22:08) (97% - 97%)  Wt(kg): --  I&O's Summary    22 Apr 2020 07:01  -  23 Apr 2020 07:00  --------------------------------------------------------  IN: 240 mL / OUT: 300 mL / NET: -60 mL      Height (cm): 154.94 (04-22 @ 12:49)  Weight (kg): 74 (04-22 @ 12:49)  BMI (kg/m2): 30.8 (04-22 @ 12:49)  BSA (m2): 1.73 (04-22 @ 12:49)    NEURO: patient is awake , alert and oriented  GEN: Not in acute distress  NECK: no thyroid enlargement, no JVD  LUNGS: Clear to auscultation bilaterally   CARDIOVASCULAR: S1/S2 present, irreg, no rubs, no carotid bruits,  + PP bilaterally  ABD: Soft, non-tender, non-distended, +BS  EXT/MS: No HETAL  SKIN: Intact      LABS:	 	                              13.7   9.12  )-----------( 278      ( 23 Apr 2020 05:49 )             41.2     04-23    144  |  103  |  19  ----------------------------<  122<H>  4.0   |  28  |  1.0    Ca    9.1      23 Apr 2020 05:49  Mg     2.0     04-23    TPro  6.2  /  Alb  3.9  /  TBili  0.6  /  DBili  x   /  AST  30  /  ALT  32  /  AlkPhos  52  04-23      Chest x ray - bl PE, improved

## 2020-04-24 ENCOUNTER — TRANSCRIPTION ENCOUNTER (OUTPATIENT)
Age: 70
End: 2020-04-24

## 2020-04-24 VITALS
HEART RATE: 98 BPM | DIASTOLIC BLOOD PRESSURE: 67 MMHG | TEMPERATURE: 98 F | RESPIRATION RATE: 18 BRPM | SYSTOLIC BLOOD PRESSURE: 127 MMHG

## 2020-04-24 LAB
ALBUMIN SERPL ELPH-MCNC: 4 G/DL — SIGNIFICANT CHANGE UP (ref 3.5–5.2)
ALP SERPL-CCNC: 55 U/L — SIGNIFICANT CHANGE UP (ref 30–115)
ALT FLD-CCNC: 31 U/L — SIGNIFICANT CHANGE UP (ref 0–41)
ANION GAP SERPL CALC-SCNC: 16 MMOL/L — HIGH (ref 7–14)
AST SERPL-CCNC: 28 U/L — SIGNIFICANT CHANGE UP (ref 0–41)
BILIRUB SERPL-MCNC: 0.6 MG/DL — SIGNIFICANT CHANGE UP (ref 0.2–1.2)
BUN SERPL-MCNC: 19 MG/DL — SIGNIFICANT CHANGE UP (ref 10–20)
CALCIUM SERPL-MCNC: 9.4 MG/DL — SIGNIFICANT CHANGE UP (ref 8.5–10.1)
CHLORIDE SERPL-SCNC: 100 MMOL/L — SIGNIFICANT CHANGE UP (ref 98–110)
CO2 SERPL-SCNC: 28 MMOL/L — SIGNIFICANT CHANGE UP (ref 17–32)
CREAT SERPL-MCNC: 0.9 MG/DL — SIGNIFICANT CHANGE UP (ref 0.7–1.5)
GLUCOSE SERPL-MCNC: 113 MG/DL — HIGH (ref 70–99)
HCT VFR BLD CALC: 43.8 % — SIGNIFICANT CHANGE UP (ref 37–47)
HGB BLD-MCNC: 14.5 G/DL — SIGNIFICANT CHANGE UP (ref 12–16)
MCHC RBC-ENTMCNC: 30.3 PG — SIGNIFICANT CHANGE UP (ref 27–31)
MCHC RBC-ENTMCNC: 33.1 G/DL — SIGNIFICANT CHANGE UP (ref 32–37)
MCV RBC AUTO: 91.6 FL — SIGNIFICANT CHANGE UP (ref 81–99)
NRBC # BLD: 0 /100 WBCS — SIGNIFICANT CHANGE UP (ref 0–0)
PLATELET # BLD AUTO: 316 K/UL — SIGNIFICANT CHANGE UP (ref 130–400)
POTASSIUM SERPL-MCNC: 3.8 MMOL/L — SIGNIFICANT CHANGE UP (ref 3.5–5)
POTASSIUM SERPL-SCNC: 3.8 MMOL/L — SIGNIFICANT CHANGE UP (ref 3.5–5)
PROT SERPL-MCNC: 6.2 G/DL — SIGNIFICANT CHANGE UP (ref 6–8)
RBC # BLD: 4.78 M/UL — SIGNIFICANT CHANGE UP (ref 4.2–5.4)
RBC # FLD: 13.2 % — SIGNIFICANT CHANGE UP (ref 11.5–14.5)
SODIUM SERPL-SCNC: 144 MMOL/L — SIGNIFICANT CHANGE UP (ref 135–146)
WBC # BLD: 11.08 K/UL — HIGH (ref 4.8–10.8)
WBC # FLD AUTO: 11.08 K/UL — HIGH (ref 4.8–10.8)

## 2020-04-24 PROCEDURE — 99239 HOSP IP/OBS DSCHRG MGMT >30: CPT

## 2020-04-24 PROCEDURE — 99232 SBSQ HOSP IP/OBS MODERATE 35: CPT

## 2020-04-24 PROCEDURE — 71045 X-RAY EXAM CHEST 1 VIEW: CPT | Mod: 26

## 2020-04-24 PROCEDURE — 99233 SBSQ HOSP IP/OBS HIGH 50: CPT

## 2020-04-24 PROCEDURE — 93010 ELECTROCARDIOGRAM REPORT: CPT

## 2020-04-24 RX ORDER — GUAIFENESIN/DEXTROMETHORPHAN 600MG-30MG
10 TABLET, EXTENDED RELEASE 12 HR ORAL
Qty: 0 | Refills: 0 | DISCHARGE
Start: 2020-04-24

## 2020-04-24 RX ORDER — METOPROLOL TARTRATE 50 MG
50 TABLET ORAL EVERY 6 HOURS
Refills: 0 | Status: DISCONTINUED | OUTPATIENT
Start: 2020-04-24 | End: 2020-04-24

## 2020-04-24 RX ORDER — SACUBITRIL AND VALSARTAN 24; 26 MG/1; MG/1
1 TABLET, FILM COATED ORAL
Qty: 60 | Refills: 0
Start: 2020-04-24 | End: 2020-05-23

## 2020-04-24 RX ORDER — AMIODARONE HYDROCHLORIDE 400 MG/1
1 TABLET ORAL
Qty: 14 | Refills: 0
Start: 2020-04-24 | End: 2020-05-07

## 2020-04-24 RX ORDER — BUDESONIDE AND FORMOTEROL FUMARATE DIHYDRATE 160; 4.5 UG/1; UG/1
0 AEROSOL RESPIRATORY (INHALATION)
Qty: 0 | Refills: 0 | DISCHARGE
Start: 2020-04-24

## 2020-04-24 RX ORDER — AMIODARONE HYDROCHLORIDE 400 MG/1
2 TABLET ORAL
Qty: 28 | Refills: 0
Start: 2020-04-24 | End: 2020-04-30

## 2020-04-24 RX ORDER — FUROSEMIDE 40 MG
40 TABLET ORAL ONCE
Refills: 0 | Status: COMPLETED | OUTPATIENT
Start: 2020-04-24 | End: 2020-04-24

## 2020-04-24 RX ORDER — METOPROLOL TARTRATE 50 MG
100 TABLET ORAL ONCE
Refills: 0 | Status: COMPLETED | OUTPATIENT
Start: 2020-04-24 | End: 2020-04-24

## 2020-04-24 RX ORDER — FUROSEMIDE 40 MG
40 TABLET ORAL DAILY
Refills: 0 | Status: DISCONTINUED | OUTPATIENT
Start: 2020-04-24 | End: 2020-04-24

## 2020-04-24 RX ORDER — METOPROLOL TARTRATE 50 MG
1 TABLET ORAL
Qty: 30 | Refills: 0
Start: 2020-04-24 | End: 2020-05-23

## 2020-04-24 RX ORDER — FUROSEMIDE 40 MG
1 TABLET ORAL
Qty: 0 | Refills: 0 | DISCHARGE
Start: 2020-04-24

## 2020-04-24 RX ORDER — SACUBITRIL AND VALSARTAN 24; 26 MG/1; MG/1
1 TABLET, FILM COATED ORAL
Qty: 0 | Refills: 0 | DISCHARGE
Start: 2020-04-24

## 2020-04-24 RX ORDER — BUDESONIDE AND FORMOTEROL FUMARATE DIHYDRATE 160; 4.5 UG/1; UG/1
2 AEROSOL RESPIRATORY (INHALATION)
Refills: 0 | Status: DISCONTINUED | OUTPATIENT
Start: 2020-04-24 | End: 2020-04-24

## 2020-04-24 RX ORDER — FUROSEMIDE 40 MG
1 TABLET ORAL
Qty: 30 | Refills: 0
Start: 2020-04-24 | End: 2020-05-23

## 2020-04-24 RX ADMIN — Medication 50 MICROGRAM(S): at 05:36

## 2020-04-24 RX ADMIN — Medication 50 MILLIGRAM(S): at 12:06

## 2020-04-24 RX ADMIN — PANTOPRAZOLE SODIUM 40 MILLIGRAM(S): 20 TABLET, DELAYED RELEASE ORAL at 05:37

## 2020-04-24 RX ADMIN — BUDESONIDE AND FORMOTEROL FUMARATE DIHYDRATE 2 PUFF(S): 160; 4.5 AEROSOL RESPIRATORY (INHALATION) at 14:27

## 2020-04-24 RX ADMIN — Medication 40 MILLIGRAM(S): at 05:38

## 2020-04-24 RX ADMIN — Medication 1 TABLET(S): at 12:06

## 2020-04-24 RX ADMIN — APIXABAN 5 MILLIGRAM(S): 2.5 TABLET, FILM COATED ORAL at 05:37

## 2020-04-24 RX ADMIN — Medication 50 MILLIGRAM(S): at 05:38

## 2020-04-24 RX ADMIN — AMIODARONE HYDROCHLORIDE 400 MILLIGRAM(S): 400 TABLET ORAL at 05:37

## 2020-04-24 RX ADMIN — Medication 40 MILLIGRAM(S): at 15:43

## 2020-04-24 RX ADMIN — Medication 100 MILLIGRAM(S): at 15:42

## 2020-04-24 RX ADMIN — Medication 1 APPLICATION(S): at 05:39

## 2020-04-24 RX ADMIN — SACUBITRIL AND VALSARTAN 1 TABLET(S): 24; 26 TABLET, FILM COATED ORAL at 05:36

## 2020-04-24 NOTE — PROGRESS NOTE ADULT - ASSESSMENT
69 years old female with PMH hypertension, Afib, hypothyroidism who is admitted with AF RVR in 110s. Attempted DCCV on 4/23, unsuccessful. Noted to have pleural effusions, diuresed, now improved. Normal TSH.    Impression:  AF RVR, better rate controlled on amio  BL pleural effusion, improving  New CMP EF 35-40%  COVID-19 negative    Plan:  - Cont Amio 400 mg Q12h x 7 days (started 4/22), f/b 200 mg daily  - Increase lopressor as BP tolerates  - Cont Eliquis and Entresto  - Cont diuresis  - Will attempt DCCV as outpatient when patient is loaded with amio and diuresed  - Monitor electrolytes, maintain WNL  - F/u with Dr. Mahajan as outpatient in 1 week for DCCV

## 2020-04-24 NOTE — PROGRESS NOTE ADULT - ATTENDING COMMENTS
I recommend event monitor MCOT to monitor for bradycardia secondary to medical therapy;  Outpatient f/u with Dr. Mahajan

## 2020-04-24 NOTE — PROGRESS NOTE ADULT - ASSESSMENT
69 year old female patient with past medical history of hypertension, Afib, hypothyroidism presented to the ED for dyspnea     INTERVAL EVENTS: cardioversion failed 5times will give silvadene cream for mild burn from attempted cardioversion, metoprolol increased this am to 50q8 entresto started this am, repeat chest xray this am improved with decreased size of effusions pending official read. will c/w IV lasix 24 more hours likely switch tomorrow     # Dyspnea secondary to CHF SaO2 97 % room air, -much improved clinically- pt in no distress  covid negative ruled out  chest x ray showing bilateral pleural effusions repeat xray this am decreased effsuions pending official read  BNP 2392 trop negative x3  Lasix increased to 40mg Iv BID- c/w lasix IV one more day  Chest CT bilateral effusions with moderate pericardial effusion  Pulm consulted for possible thoracentesis hold off for now as pt responding well with IV lasix and a CHF picture  -entresto started    #dry cough- robitussin/ tessalon    # Afib with RVR and resultant pulmonary vascular congestion, pt remains tachy 110-120s  c/w po amio will d/c on 400q12 one week then 200 daily until next cardioversion  cardioversion yesterday unsuccesful 5 times  had CCTA ruling out DEREJE will need to try again out pt.  increased metoprolol to 50q6 today will d/c on 200 xl  entresto started  Continue Lasix 40 mg Iv BID - pt with good urine outout will d/c on 40 daily  Strict Is and os and daily weights with Fluid restriction of 1500 mL/day   -Official echo ordered EF 35-40% 4-20 previous echo 1-20 EF between 60 and 65 %     #hx of Hypothyroidism   Continue Synthroid 50 mcg daily    TSH and T4 wnl    # HTN   Off Losartan at home. Consider restarting if BP elevated     # Vitamin D deficiency   Continue Vitamin D2 50 000 units weekly     # Miscellaneous   DVT prophylaxis : Patient on Eliquis   GI prophylaxis : Protonix 40 mg daily   Activity : Ambulate as tolerated   Diet : DASH TLC   Code status : Full code

## 2020-04-24 NOTE — PROGRESS NOTE ADULT - SUBJECTIVE AND OBJECTIVE BOX
INTERVAL HISTORY: No overnight events.  less sob, hr still 100s  hemodyn stable  	  MEDICATIONS:  acetaminophen   Tablet .. 650 milliGRAM(s) Oral every 6 hours PRN  aMIOdarone    Tablet 400 milliGRAM(s) Oral every 12 hours  apixaban 5 milliGRAM(s) Oral every 12 hours  benzonatate 100 milliGRAM(s) Oral three times a day PRN  ergocalciferol 57316 Unit(s) Oral every week  furosemide   Injectable 40 milliGRAM(s) IV Push two times a day  guaifenesin/dextromethorphan  Syrup 10 milliLiter(s) Oral every 4 hours PRN  levothyroxine 50 MICROGram(s) Oral daily  metoprolol tartrate 50 milliGRAM(s) Oral every 6 hours  multivitamin 1 Tablet(s) Oral daily  pantoprazole    Tablet 40 milliGRAM(s) Oral before breakfast  sacubitril 24 mG/valsartan 26 mG 1 Tablet(s) Oral two times a day  silver sulfADIAZINE 1% Cream 1 Application(s) Topical two times a day      REVIEW OF SYSTEMS:  as above, otherwise unrem    PHYSICAL EXAM:  T(C): 36.6 (04-24-20 @ 05:18), Max: 36.9 (04-23-20 @ 20:00)  HR: 117 (04-24-20 @ 05:18) (111 - 119)  BP: 110/57 (04-24-20 @ 05:18) (110/57 - 122/78)  RR: 18 (04-24-20 @ 05:18) (18 - 18)  SpO2: 94% (04-24-20 @ 07:54) (94% - 98%)  Wt(kg): --  I&O's Summary    23 Apr 2020 07:01  -  24 Apr 2020 07:00  --------------------------------------------------------  IN: 560 mL / OUT: 0 mL / NET: 560 mL          NEURO: patient is awake , alert and oriented  GEN: Not in acute distress  NECK: no thyroid enlargement, no JVD  LUNGS: Clear to auscultation bilaterally   CARDIOVASCULAR: S1/S2 present, irreg, no rubs, no carotid bruits,  + PP bilaterally  ABD: Soft, non-tender, non-distended, +BS  EXT/MS: No HETAL  SKIN: Intact      LABS:	 	                              14.5   11.08 )-----------( 316      ( 24 Apr 2020 05:24 )             43.8     04-24    144  |  100  |  19  ----------------------------<  113<H>  3.8   |  28  |  0.9    Ca    9.4      24 Apr 2020 05:24  Mg     2.0     04-23    TPro  6.2  /  Alb  4.0  /  TBili  0.6  /  DBili  x   /  AST  28  /  ALT  31  /  AlkPhos  55  04-24

## 2020-04-24 NOTE — PROGRESS NOTE ADULT - REASON FOR ADMISSION
Dyspnea

## 2020-04-24 NOTE — PROGRESS NOTE ADULT - ASSESSMENT
A 69 years old female presented with worsening sob for the last two weeks. Also C/O palpitations.       1.	Ac. HFrEF  2.	B/L Pleural effusions.   3.	A-Fib with RVR  4.	HTN  5.	Hypothyroidism  6.	H/O recent Atypical PNA S/P treatment  7.	COVID-19 infection ruled out.          PLAN:     ·	Failed CV yesterday. EP recommended to cont high dose of Amiodarone. Will try CV again.   ·	Care D/W the cardiology. Heart failure specialist catrachito noted. Started her on Entresto 24/26 mg  po twice a day. Watch for the BP as the medication causes hypotension.   ·	Will cont Lasix 40 mg ivp q 12h.   ·	Check i's and o's and daily wt  ·	Low salt diet and water restriction to 1.5 L/D  ·	ECHO reviewed. EF 35-40%. Small pericardial effusion.   ·	Cont Amiodarone 400 mg po q 12h  ·	Cont Metoprolol 50 mg po q 8h.   ·	COVID result came back negative.   ·	D-dimer 449, CRP 0.47, Procal 0.02  ·	CE x 3 are negative  ·	TSH, T3, T4 are normal A 69 years old female presented with worsening sob for the last two weeks. Also C/O palpitations.       1.	Ac. HFrEF  2.	B/L Pleural effusions.   3.	A-Fib with RVR  4.	HTN  5.	Hypothyroidism  6.	H/O recent Atypical PNA S/P treatment  7.	COVID-19 infection ruled out.          PLAN:     ·	Cardiology F/U noted. Control the HR. CV will be done as an out pt. S/P failed CV  ·	Cont amiodarone 400 mg po q 12h for one week, then 200 mg po daily. Metoprolol increased to 50 mg  po q 6h.   ·	Care D/W Heart failure specialist. Started her on Entresto 24/26 mg  po twice a day. Tolerating well. BP is stable  ·	Cont Lasix 40 mg ivp q 12h. On D/C will switch her to 40 mg po daily  ·	Check i's and o's and daily wt  ·	Low salt diet and water restriction to 1.5 L/D  ·	ECHO reviewed. EF 35-40%. Small pericardial effusion.   ·	COVID result came back negative.   ·	D-dimer 449, CRP 0.47, Procal 0.02  ·	CE x 3 are negative  ·	TSH, T3, T4 are normal A 69 years old female presented with worsening sob for the last two weeks. Also C/O palpitations.       1.	Ac. HFrEF  2.	B/L Pleural effusions.   3.	A-Fib with RVR  4.	HTN  5.	Hypothyroidism  6.	H/O recent Atypical PNA S/P treatment  7.	COVID-19 infection ruled out.          PLAN:     ·	Cardiology F/U noted. Control the HR. CV will be done as an out pt. S/P failed CV  ·	Cont amiodarone 400 mg po q 12h for one week, then 200 mg po daily. Metoprolol increased to 50 mg  po q 6h.   ·	Care D/W Heart failure specialist. Started her on Entresto 24/26 mg  po twice a day. Tolerating well. BP is stable  ·	Cont Lasix 40 mg ivp q 12h. On D/C will switch her to 40 mg po daily  ·	Check i's and o's and daily wt  ·	Low salt diet and water restriction to 1.5 L/D  ·	ECHO reviewed. EF 35-40%. Small pericardial effusion.   ·	Start her on Spiriva two puffs twice a day. F/U with pulmonary as an out pt for chronic cough.   ·	COVID result came back negative.   ·	D-dimer 449, CRP 0.47, Procal 0.02  ·	CE x 3 are negative  ·	TSH, T3, T4 are normal

## 2020-04-24 NOTE — PROGRESS NOTE ADULT - SUBJECTIVE AND OBJECTIVE BOX
INTERVAL HPI/OVERNIGHT EVENTS:  Patient remians in AF HR , high as 118. No tele events otherwise noted. Patient denies fever, chills, dizziness, syncope, chest pain, palpitations, SOB, cough, abd pain, n/v/d/c, dysuria, hematuria or unusual rash.     MEDICATIONS  (STANDING):  aMIOdarone    Tablet 400 milliGRAM(s) Oral every 12 hours  apixaban 5 milliGRAM(s) Oral every 12 hours  ergocalciferol 95880 Unit(s) Oral every week  furosemide    Tablet 40 milliGRAM(s) Oral daily  levothyroxine 50 MICROGram(s) Oral daily  metoprolol tartrate 50 milliGRAM(s) Oral every 6 hours  multivitamin 1 Tablet(s) Oral daily  pantoprazole    Tablet 40 milliGRAM(s) Oral before breakfast  sacubitril 24 mG/valsartan 26 mG 1 Tablet(s) Oral two times a day  silver sulfADIAZINE 1% Cream 1 Application(s) Topical two times a day    MEDICATIONS  (PRN):  acetaminophen   Tablet .. 650 milliGRAM(s) Oral every 6 hours PRN Temp greater or equal to 38C (100.4F)  benzonatate 100 milliGRAM(s) Oral three times a day PRN Cough  guaifenesin/dextromethorphan  Syrup 10 milliLiter(s) Oral every 4 hours PRN Cough      Allergies  No Known Allergies      REVIEW OF SYSTEMS  A ten-point review of systems was otherwise negative except as noted.    Vital Signs Last 24 Hrs  T(C): 36.6 (24 Apr 2020 05:18), Max: 36.9 (23 Apr 2020 20:00)  T(F): 97.8 (24 Apr 2020 05:18), Max: 98.5 (23 Apr 2020 20:00)  HR: 117 (24 Apr 2020 05:18) (111 - 119)  BP: 110/57 (24 Apr 2020 05:18) (110/57 - 122/78)  BP(mean): --  RR: 18 (24 Apr 2020 05:18) (18 - 18)  SpO2: 94% (24 Apr 2020 07:54) (94% - 98%)    04-23-20 @ 07:01  -  04-24-20 @ 07:00  --------------------------------------------------------  IN: 560 mL / OUT: 0 mL / NET: 560 mL    Physical Exam  GENERAL: In no apparent distress, well nourished, and hydrated.  HEART: irregular rate and rhythm; No murmurs, rubs, or gallops.  PULMONARY: Clear to auscultation and perfusion, diminished on the left lower lobe.  No rales, wheezing, or rhonchi bilaterally.  ABDOMEN: Soft, Nontender, Nondistended; Bowel sounds present  EXTREMITIES:  2+ Peripheral Pulses, No clubbing, cyanosis, or edema  NEUROLOGICAL: Grossly nonfocal AO x3, THORNTON, speech clear.     LABS:                        14.5   11.08 )-----------( 316      ( 24 Apr 2020 05:24 )             43.8     04-24    144  |  100  |  19  ----------------------------<  113<H>  3.8   |  28  |  0.9    Ca    9.4      24 Apr 2020 05:24  Mg     2.0     04-23    TPro  6.2  /  Alb  4.0  /  TBili  0.6  /  DBili  x   /  AST  28  /  ALT  31  /  AlkPhos  55  04-24      RADIOLOGY & ADDITIONAL TESTS:  < from: Transthoracic Echocardiogram (04.20.20 @ 10:09) >  Summary:   1. Left ventricular ejection fraction, by visual estimation, is 35 to 40%.   2. Grossly moderately decreased global left ventricular systolic function.   3. LA volume Index is 41.0 ml/m² ml/m2.   4. Small pericardial effusion.    PHYSICIAN INTERPRETATION:  Left Ventricle: The left ventricular internal cavity size is normal. Left ventricular wall thickness is normal. GlobalLV systolic function was moderately decreased. Left ventricular ejection fraction, by visual estimation, is 35 to 40%.  Right Ventricle: Normal right ventricular size and function.  Left Atrium: Moderately enlarged left atrium. LA volume Index is 41.0 ml/m² ml/m2.  Right Atrium: Right atrial enlargement.  Pericardium: A small pericardial effusion is present.  Mitral Valve: The mitral valve is normal in structure. No evidence of mitral stenosis. Trace mitral valve regurgitation is seen.  TricuspidValve: The tricuspid valve is normal in structure. Trivial tricuspid regurgitation is visualized.  Aortic Valve: The aortic valve is trileaflet. No evidence of aortic stenosis. No aortic regurgitation.  Pulmonic Valve: The pulmonic valve was not wellvisualized.  Aorta: Aortic root measured at sinotubular junction is normal.  Pulmonary Artery: The pulmonary artery is not well seen.  Venous: The inferior vena cava was normal sized, with respiratory size variation greater than 50%.    < end of copied text >

## 2020-04-24 NOTE — PROGRESS NOTE ADULT - SUBJECTIVE AND OBJECTIVE BOX
SUBJECTIVE:    Patient is a 70y old Female who presents with a chief complaint of Dyspnea (23 Apr 2020 11:25)    Currently admitted to medicine with the primary diagnosis of SOB (shortness of breath)     Today is hospital day 5d. This morning she is resting comfortably in bed and reports no new issues or overnight events.     INTERVAL EVENTS: Pt still elevated HR - 110-120, no sob no chest pain no complaints. Will increase metorpolol to q6 today d/c later today on toprol 200 xl, amio 400 q12 for one  week then 200 daily until next cardioverision trial   lasix 40 oral     PAST MEDICAL & SURGICAL HISTORY  Vitamin D deficiency  Afib  Hypothyroid  Hypertension  S/P cholecystectomy      ALLERGIES:  No Known Allergies    MEDICATIONS:  STANDING MEDICATIONS  aMIOdarone    Tablet 400 milliGRAM(s) Oral every 12 hours  apixaban 5 milliGRAM(s) Oral every 12 hours  ergocalciferol 54995 Unit(s) Oral every week  furosemide   Injectable 40 milliGRAM(s) IV Push two times a day  levothyroxine 50 MICROGram(s) Oral daily  metoprolol tartrate 50 milliGRAM(s) Oral every 6 hours  multivitamin 1 Tablet(s) Oral daily  pantoprazole    Tablet 40 milliGRAM(s) Oral before breakfast  sacubitril 24 mG/valsartan 26 mG 1 Tablet(s) Oral two times a day  silver sulfADIAZINE 1% Cream 1 Application(s) Topical two times a day    PRN MEDICATIONS  acetaminophen   Tablet .. 650 milliGRAM(s) Oral every 6 hours PRN  benzonatate 100 milliGRAM(s) Oral three times a day PRN  guaifenesin/dextromethorphan  Syrup 10 milliLiter(s) Oral every 4 hours PRN    VITALS:   T(F): 97.8  HR: 117  BP: 110/57  RR: 18  SpO2: 94%    LABS:                        14.5   11.08 )-----------( 316      ( 24 Apr 2020 05:24 )             43.8     04-24    144  |  100  |  19  ----------------------------<  113<H>  3.8   |  28  |  0.9    Ca    9.4      24 Apr 2020 05:24  Mg     2.0     04-23    TPro  6.2  /  Alb  4.0  /  TBili  0.6  /  DBili  x   /  AST  28  /  ALT  31  /  AlkPhos  55  04-24          RADIOLOGY:    PHYSICAL EXAM:  GEN: No acute distress  PULM/CHEST: Clear to auscultation bilaterally, no rales, rhonchi or wheezes   CVS: Regular rate and rhythm, S1-S2, no murmurs  ABD: Soft, non-tender, non-distended, +BS  EXT: No edema  NEURO: AAOx3    Lockhart Catheter:

## 2020-04-24 NOTE — PROGRESS NOTE ADULT - ASSESSMENT
A.fib, s/p failed DCCV  CHF - likely tach induced - better  Hypothyroidism  HTN      cont present care  change lasix to po  increase bb as bp tolerates, if HR high low 100s -would d/c home today on med rx and repeat cv as outpt  cont entresto  cont a/c and amiodarone 400 bid for 7 days, then 200 daily  supplement K/Mg as needed  outpt f/up EP/Cardio 1 week

## 2020-04-24 NOTE — PROGRESS NOTE ADULT - SUBJECTIVE AND OBJECTIVE BOX
ALVARO OLIVERA  70y Female    CHIEF COMPLAINT:    Patient is a 70y old  Female who presents with a chief complaint of Dyspnea (24 Apr 2020 09:02)      INTERVAL HPI/OVERNIGHT EVENTS:    Patient seen and examined.    ROS: All other systems are negative.    Vital Signs:    T(F): 97.8 (04-24-20 @ 05:18), Max: 98.5 (04-23-20 @ 20:00)  HR: 117 (04-24-20 @ 05:18) (111 - 119)  BP: 110/57 (04-24-20 @ 05:18) (110/57 - 122/78)  RR: 18 (04-24-20 @ 05:18) (18 - 18)  SpO2: 94% (04-24-20 @ 07:54) (94% - 98%)  I&O's Summary    23 Apr 2020 07:01  -  24 Apr 2020 07:00  --------------------------------------------------------  IN: 560 mL / OUT: 0 mL / NET: 560 mL      Daily     Daily   CAPILLARY BLOOD GLUCOSE          PHYSICAL EXAM:    GENERAL:  NAD  SKIN: No rashes or lesions  HENT: Atrumatic. Normocephalic. PERRL. Moist membranes.  NECK: Supple, No JVD. No lymphadenopathy.  PULMONARY: CTA B/L. No wheezing. No rales  CVS: Normal S1, S2. Rate and Rythm are regular. No murmurs.  ABDOMEN/GI: Soft, Nontender, Nondistended; BS present  EXTREMITIES: Peripheral pulses intact. No edema B/L LE.  NEUROLOGIC:  No motor or sensory deficit.  PSYCH: Alert & oriented x 3    Consultant(s) Notes Reviewed:  [x ] YES  [ ] NO  Care Discussed with Consultants/Other Providers [ x] YES  [ ] NO    EKG reviewed  Telemetry reviewed    LABS:                        14.5   11.08 )-----------( 316      ( 24 Apr 2020 05:24 )             43.8     04-24    144  |  100  |  19  ----------------------------<  113<H>  3.8   |  28  |  0.9    Ca    9.4      24 Apr 2020 05:24  Mg     2.0     04-23    TPro  6.2  /  Alb  4.0  /  TBili  0.6  /  DBili  x   /  AST  28  /  ALT  31  /  AlkPhos  55  04-24      Serum Pro-Brain Natriuretic Peptide: 2392 pg/mL (04-19-20 @ 16:20)          RADIOLOGY & ADDITIONAL TESTS:      Imaging or report Personally Reviewed:  [ ] YES  [ ] NO    Medications:  Standing  aMIOdarone    Tablet 400 milliGRAM(s) Oral every 12 hours  apixaban 5 milliGRAM(s) Oral every 12 hours  ergocalciferol 83653 Unit(s) Oral every week  furosemide    Tablet 40 milliGRAM(s) Oral daily  levothyroxine 50 MICROGram(s) Oral daily  metoprolol tartrate 50 milliGRAM(s) Oral every 6 hours  multivitamin 1 Tablet(s) Oral daily  pantoprazole    Tablet 40 milliGRAM(s) Oral before breakfast  sacubitril 24 mG/valsartan 26 mG 1 Tablet(s) Oral two times a day  silver sulfADIAZINE 1% Cream 1 Application(s) Topical two times a day    PRN Meds  acetaminophen   Tablet .. 650 milliGRAM(s) Oral every 6 hours PRN  benzonatate 100 milliGRAM(s) Oral three times a day PRN  guaifenesin/dextromethorphan  Syrup 10 milliLiter(s) Oral every 4 hours PRN      Case discussed with resident    Care discussed with pt/family ALVARO OLIVERA  70y Female    CHIEF COMPLAINT:    Patient is a 70y old  Female who presents with a chief complaint of Dyspnea (24 Apr 2020 09:02)      INTERVAL HPI/OVERNIGHT EVENTS:    Patient seen and examined. C/O dry cough. No sob. No palpitation. Still tachycardiac on tele    ROS: All other systems are negative.    Vital Signs:    T(F): 97.8 (04-24-20 @ 05:18), Max: 98.5 (04-23-20 @ 20:00)  HR: 117 (04-24-20 @ 05:18) (111 - 119)  BP: 110/57 (04-24-20 @ 05:18) (110/57 - 122/78)  RR: 18 (04-24-20 @ 05:18) (18 - 18)  SpO2: 94% (04-24-20 @ 07:54) (94% - 98%)  I&O's Summary    23 Apr 2020 07:01  -  24 Apr 2020 07:00  --------------------------------------------------------  IN: 560 mL / OUT: 0 mL / NET: 560 mL      Daily     Daily   CAPILLARY BLOOD GLUCOSE          PHYSICAL EXAM:    GENERAL:  NAD  SKIN: No rashes or lesions  HENT: Atraumatic Normocephalic. PERRL. Moist membranes.  NECK: Supple, No JVD. No lymphadenopathy.  PULMONARY: BS are decreased in the bases B/L. No wheezing. No rales  CVS: Normal S1, S2. Rate and Rhythm are IRIR. No murmurs.  ABDOMEN/GI: Soft, Nontender, Nondistended; BS present  EXTREMITIES: Peripheral pulses intact. No edema B/L LE.  NEUROLOGIC:  No motor or sensory deficit.  PSYCH: Alert & oriented x 3    Consultant(s) Notes Reviewed:  [x ] YES  [ ] NO  Care Discussed with Consultants/Other Providers [ x] YES  [ ] NO    EKG reviewed  Telemetry reviewed    LABS:                        14.5   11.08 )-----------( 316      ( 24 Apr 2020 05:24 )             43.8     04-24    144  |  100  |  19  ----------------------------<  113<H>  3.8   |  28  |  0.9    Ca    9.4      24 Apr 2020 05:24  Mg     2.0     04-23    TPro  6.2  /  Alb  4.0  /  TBili  0.6  /  DBili  x   /  AST  28  /  ALT  31  /  AlkPhos  55  04-24      Serum Pro-Brain Natriuretic Peptide: 2392 pg/mL (04-19-20 @ 16:20)          RADIOLOGY & ADDITIONAL TESTS:      Imaging or report Personally Reviewed:  [ ] YES  [ ] NO    Medications:  Standing  aMIOdarone    Tablet 400 milliGRAM(s) Oral every 12 hours  apixaban 5 milliGRAM(s) Oral every 12 hours  ergocalciferol 27424 Unit(s) Oral every week  furosemide    Tablet 40 milliGRAM(s) Oral daily  levothyroxine 50 MICROGram(s) Oral daily  metoprolol tartrate 50 milliGRAM(s) Oral every 6 hours  multivitamin 1 Tablet(s) Oral daily  pantoprazole    Tablet 40 milliGRAM(s) Oral before breakfast  sacubitril 24 mG/valsartan 26 mG 1 Tablet(s) Oral two times a day  silver sulfADIAZINE 1% Cream 1 Application(s) Topical two times a day    PRN Meds  acetaminophen   Tablet .. 650 milliGRAM(s) Oral every 6 hours PRN  benzonatate 100 milliGRAM(s) Oral three times a day PRN  guaifenesin/dextromethorphan  Syrup 10 milliLiter(s) Oral every 4 hours PRN      Case discussed with resident    Care discussed with pt/family

## 2020-04-24 NOTE — DISCHARGE NOTE NURSING/CASE MANAGEMENT/SOCIAL WORK - PATIENT PORTAL LINK FT
You can access the FollowMyHealth Patient Portal offered by [facility] by registering at the following website: [email or web address]. By joining [employer]’s FollowMyHealth portal, you will also be able to view your health information using other applications (apps) compatible with our system.

## 2020-04-27 DIAGNOSIS — E87.6 HYPOKALEMIA: ICD-10-CM

## 2020-04-27 DIAGNOSIS — I11.0 HYPERTENSIVE HEART DISEASE WITH HEART FAILURE: ICD-10-CM

## 2020-04-27 DIAGNOSIS — I42.8 OTHER CARDIOMYOPATHIES: ICD-10-CM

## 2020-04-27 DIAGNOSIS — Z79.01 LONG TERM (CURRENT) USE OF ANTICOAGULANTS: ICD-10-CM

## 2020-04-27 DIAGNOSIS — Z90.49 ACQUIRED ABSENCE OF OTHER SPECIFIED PARTS OF DIGESTIVE TRACT: ICD-10-CM

## 2020-04-27 DIAGNOSIS — I50.21 ACUTE SYSTOLIC (CONGESTIVE) HEART FAILURE: ICD-10-CM

## 2020-04-27 DIAGNOSIS — R74.0 NONSPECIFIC ELEVATION OF LEVELS OF TRANSAMINASE AND LACTIC ACID DEHYDROGENASE [LDH]: ICD-10-CM

## 2020-04-27 DIAGNOSIS — Z79.890 HORMONE REPLACEMENT THERAPY: ICD-10-CM

## 2020-04-27 DIAGNOSIS — E55.9 VITAMIN D DEFICIENCY, UNSPECIFIED: ICD-10-CM

## 2020-04-27 DIAGNOSIS — E03.9 HYPOTHYROIDISM, UNSPECIFIED: ICD-10-CM

## 2020-04-27 DIAGNOSIS — I48.0 PAROXYSMAL ATRIAL FIBRILLATION: ICD-10-CM

## 2020-04-27 DIAGNOSIS — Z11.59 ENCOUNTER FOR SCREENING FOR OTHER VIRAL DISEASES: ICD-10-CM

## 2020-05-01 PROBLEM — I48.91 UNSPECIFIED ATRIAL FIBRILLATION: Chronic | Status: ACTIVE | Noted: 2020-04-19

## 2020-05-01 PROBLEM — E55.9 VITAMIN D DEFICIENCY, UNSPECIFIED: Chronic | Status: ACTIVE | Noted: 2020-04-19

## 2020-05-05 RX ORDER — NEBIVOLOL HYDROCHLORIDE 10 MG/1
10 TABLET ORAL
Refills: 0 | Status: COMPLETED | COMMUNITY
End: 2020-05-05

## 2020-05-05 RX ORDER — LOSARTAN POTASSIUM 100 MG/1
TABLET, FILM COATED ORAL
Refills: 0 | Status: COMPLETED | COMMUNITY
End: 2020-05-05

## 2020-05-05 RX ORDER — AMLODIPINE BESYLATE 5 MG/1
5 TABLET ORAL
Refills: 0 | Status: COMPLETED | COMMUNITY
End: 2020-05-05

## 2020-05-05 RX ORDER — ALENDRONATE SODIUM 70 MG/1
70 TABLET ORAL
Refills: 0 | Status: COMPLETED | COMMUNITY
End: 2020-05-05

## 2020-05-08 ENCOUNTER — APPOINTMENT (OUTPATIENT)
Dept: CARDIOLOGY | Facility: CLINIC | Age: 70
End: 2020-05-08
Payer: MEDICARE

## 2020-05-08 VITALS — DIASTOLIC BLOOD PRESSURE: 80 MMHG | SYSTOLIC BLOOD PRESSURE: 120 MMHG | HEART RATE: 110 BPM | RESPIRATION RATE: 12 BRPM

## 2020-05-08 DIAGNOSIS — Z82.49 FAMILY HISTORY OF ISCHEMIC HEART DISEASE AND OTHER DISEASES OF THE CIRCULATORY SYSTEM: ICD-10-CM

## 2020-05-08 PROCEDURE — 99214 OFFICE O/P EST MOD 30 MIN: CPT | Mod: 95

## 2020-05-08 PROCEDURE — 99204 OFFICE O/P NEW MOD 45 MIN: CPT | Mod: 95

## 2020-05-08 NOTE — ASSESSMENT
[FreeTextEntry1] : AF with RVR\par HF\par - DEONDRE/DCCV\par - cotn amio\par - cont metoprolol\par - cont AC

## 2020-05-08 NOTE — PHYSICAL EXAM
[Normal Oral Mucosa] : normal oral mucosa [No Oral Cyanosis] : no oral cyanosis [No Oral Pallor] : no oral pallor [Normal Jugular Venous A Waves Present] : normal jugular venous A waves present [Normal Jugular Venous V Waves Present] : normal jugular venous V waves present [Gait - Sufficient For Exercise Testing] : the gait was sufficient for exercise testing [Abnormal Walk] : normal gait [No Jugular Venous Causey A Waves] : no jugular venous causey A waves [Nail Clubbing] : no clubbing of the fingernails [Petechial Hemorrhages (___cm)] : no petechial hemorrhages [Cyanosis, Localized] : no localized cyanosis [] : no ischemic changes

## 2020-05-08 NOTE — HISTORY OF PRESENT ILLNESS
[FreeTextEntry1] : Ms. ALVARO OLIVERA has given me verbal authorization to provide the tele services\par Verbal consent given on 05/08/2020  by the patient.\par \par This visit was provided via telehealth using real-time 2-way audio visual technology. The patient,  Ms. ALVARO OLIVERA,   was located at home,  [address] [place] [address] [address]\[place] [address], at the time of the visit. \par \par The patient, Ms. ALVARO OLIVERA  and Provider participated in the telehealth encounter. \par \par I have spent 22 minutes speaking with or face-to-face discussing\par \par Patient with history of hypertension was recently admitted to hospital with new onset of atrial fibrillation with rapid ventricular response and found to have ejection fraction of 30%. Patient also found to have pleural effusions which resulted Lasix. Cardioversion was attempted on while started amiodarone however was unable to maintain normal sinus rhythm.\par Patient was discharged home on amiodarone and metoprolol. Patient had one episode of bradycardia recorded by event monitor and metal probe was decreased. Today patients feels fine does not complain of palpitations. However, patient heart rate is elevated 110 bpm.\par

## 2020-05-11 ENCOUNTER — APPOINTMENT (OUTPATIENT)
Dept: LAB | Facility: CLINIC | Age: 70
End: 2020-05-11
Payer: MEDICARE

## 2020-05-11 ENCOUNTER — TRANSCRIBE ORDERS (OUTPATIENT)
Dept: ADMINISTRATIVE | Facility: HOSPITAL | Age: 70
End: 2020-05-11

## 2020-05-11 DIAGNOSIS — I48.19 PERSISTENT ATRIAL FIBRILLATION (HCC): Primary | ICD-10-CM

## 2020-05-11 DIAGNOSIS — I48.19 PERSISTENT ATRIAL FIBRILLATION (HCC): ICD-10-CM

## 2020-05-11 LAB
ANION GAP SERPL CALCULATED.3IONS-SCNC: 6 MMOL/L (ref 4–13)
BASOPHILS # BLD AUTO: 0.1 THOUSANDS/ΜL (ref 0–0.1)
BASOPHILS NFR BLD AUTO: 1 % (ref 0–1)
BUN SERPL-MCNC: 16 MG/DL (ref 5–25)
CALCIUM SERPL-MCNC: 9.7 MG/DL (ref 8.3–10.1)
CHLORIDE SERPL-SCNC: 107 MMOL/L (ref 100–108)
CO2 SERPL-SCNC: 26 MMOL/L (ref 21–32)
CREAT SERPL-MCNC: 0.85 MG/DL (ref 0.6–1.3)
EOSINOPHIL # BLD AUTO: 0.14 THOUSAND/ΜL (ref 0–0.61)
EOSINOPHIL NFR BLD AUTO: 2 % (ref 0–6)
ERYTHROCYTE [DISTWIDTH] IN BLOOD BY AUTOMATED COUNT: 13.2 % (ref 11.6–15.1)
GFR SERPL CREATININE-BSD FRML MDRD: 69 ML/MIN/1.73SQ M
GLUCOSE SERPL-MCNC: 95 MG/DL (ref 65–140)
HCT VFR BLD AUTO: 44 % (ref 34.8–46.1)
HGB BLD-MCNC: 14.2 G/DL (ref 11.5–15.4)
IMM GRANULOCYTES # BLD AUTO: 0.02 THOUSAND/UL (ref 0–0.2)
IMM GRANULOCYTES NFR BLD AUTO: 0 % (ref 0–2)
INR PPP: 1.27 (ref 0.84–1.19)
LYMPHOCYTES # BLD AUTO: 1.41 THOUSANDS/ΜL (ref 0.6–4.47)
LYMPHOCYTES NFR BLD AUTO: 15 % (ref 14–44)
MCH RBC QN AUTO: 29.9 PG (ref 26.8–34.3)
MCHC RBC AUTO-ENTMCNC: 32.3 G/DL (ref 31.4–37.4)
MCV RBC AUTO: 93 FL (ref 82–98)
MONOCYTES # BLD AUTO: 0.87 THOUSAND/ΜL (ref 0.17–1.22)
MONOCYTES NFR BLD AUTO: 9 % (ref 4–12)
NEUTROPHILS # BLD AUTO: 7.03 THOUSANDS/ΜL (ref 1.85–7.62)
NEUTS SEG NFR BLD AUTO: 73 % (ref 43–75)
NRBC BLD AUTO-RTO: 0 /100 WBCS
PLATELET # BLD AUTO: 372 THOUSANDS/UL (ref 149–390)
PMV BLD AUTO: 10 FL (ref 8.9–12.7)
POTASSIUM SERPL-SCNC: 3.7 MMOL/L (ref 3.5–5.3)
PROTHROMBIN TIME: 15.5 SECONDS (ref 11.6–14.5)
RBC # BLD AUTO: 4.75 MILLION/UL (ref 3.81–5.12)
SODIUM SERPL-SCNC: 139 MMOL/L (ref 136–145)
WBC # BLD AUTO: 9.57 THOUSAND/UL (ref 4.31–10.16)

## 2020-05-11 PROCEDURE — 36415 COLL VENOUS BLD VENIPUNCTURE: CPT

## 2020-05-11 PROCEDURE — 80048 BASIC METABOLIC PNL TOTAL CA: CPT

## 2020-05-11 PROCEDURE — 85610 PROTHROMBIN TIME: CPT

## 2020-05-11 PROCEDURE — 85025 COMPLETE CBC W/AUTO DIFF WBC: CPT

## 2020-05-13 ENCOUNTER — EMERGENCY (EMERGENCY)
Facility: HOSPITAL | Age: 70
LOS: 0 days | Discharge: HOME | End: 2020-05-13
Attending: EMERGENCY MEDICINE | Admitting: EMERGENCY MEDICINE
Payer: MEDICARE

## 2020-05-13 VITALS
TEMPERATURE: 99 F | HEART RATE: 55 BPM | DIASTOLIC BLOOD PRESSURE: 78 MMHG | SYSTOLIC BLOOD PRESSURE: 127 MMHG | RESPIRATION RATE: 18 BRPM | OXYGEN SATURATION: 98 %

## 2020-05-13 DIAGNOSIS — Z90.49 ACQUIRED ABSENCE OF OTHER SPECIFIED PARTS OF DIGESTIVE TRACT: Chronic | ICD-10-CM

## 2020-05-13 DIAGNOSIS — R05 COUGH: ICD-10-CM

## 2020-05-13 DIAGNOSIS — I10 ESSENTIAL (PRIMARY) HYPERTENSION: ICD-10-CM

## 2020-05-13 DIAGNOSIS — I48.0 PAROXYSMAL ATRIAL FIBRILLATION: ICD-10-CM

## 2020-05-13 DIAGNOSIS — E03.9 HYPOTHYROIDISM, UNSPECIFIED: ICD-10-CM

## 2020-05-13 LAB — SARS-COV-2 RNA SPEC QL NAA+PROBE: SIGNIFICANT CHANGE UP

## 2020-05-13 PROCEDURE — 71045 X-RAY EXAM CHEST 1 VIEW: CPT | Mod: 26

## 2020-05-13 PROCEDURE — 93010 ELECTROCARDIOGRAM REPORT: CPT

## 2020-05-13 PROCEDURE — 99284 EMERGENCY DEPT VISIT MOD MDM: CPT | Mod: CS

## 2020-05-13 NOTE — ED PROVIDER NOTE - PATIENT PORTAL LINK FT
You can access the FollowMyHealth Patient Portal offered by [facility] by registering at the following website: [email or web address]. By joining [employer]’s FollowMyHealth portal, you will also be able to view your health information using other applications (apps) compatible with our system.

## 2020-05-13 NOTE — ED PROVIDER NOTE - NS ED ROS FT
Review of Systems    Constitutional: (-) fever, (-) chills  Eyes/ENT: (-) blurry vision, (-) epistaxis, (-) sore throat  Cardiovascular: (-) chest pain, (-) syncope, (+) palpitations  Respiratory: (+) cough, (-) shortness of breath  Gastrointestinal: (-) pain, (-) nausea, (-) vomiting, (-) diarrhea  Musculoskeletal: (-) neck pain, (-) back pain, (-) body aches  Integumentary: (-) rash, (-) edema  Neurological: (-) headache, (-) altered mental status  Psychiatric: (-) hallucinations  Allergic/Immunologic: (-) pruritus

## 2020-05-13 NOTE — ED PROVIDER NOTE - PROGRESS NOTE DETAILS
Patient in SR, EP VI [place] is aware. patient is waiting to be seen by Dr Mahajan. Patient seen by Dr. Mahajan in ED. Patient  can be dc home Patient to be discharged from ED. Any available test results were discussed with patient and/or family. Verbal instructions given, including instructions to return to ED immediately for any new, worsening, or concerning symptoms. Patient endorsed understanding. Written discharge instructions additionally given, including follow-up plan.  Patient was given opportunity to ask questions.

## 2020-05-13 NOTE — ED PROVIDER NOTE - PROVIDER TOKENS
FREE:[LAST:[your PMD],PHONE:[(   )    -],FAX:[(   )    -],FOLLOWUP:[1-3 Days]],PROVIDER:[TOKEN:[[id number]],FOLLOWUP:[1-3 Days]]

## 2020-05-13 NOTE — ED ADULT NURSE NOTE - NSIMPLEMENTINTERV_GEN_ALL_ED
Implemented All Universal Safety Interventions:  Angie to call system. Call bell, personal items and telephone within reach. Instruct patient to call for assistance. Room bathroom lighting operational. Non-slip footwear when patient is off stretcher. Physically safe environment: no spills, clutter or unnecessary equipment. Stretcher in lowest position, wheels locked, appropriate side rails in place.

## 2020-05-13 NOTE — ED ADULT NURSE NOTE - OBJECTIVE STATEMENT
pt presented to ED for cardioversion with Dr. Mahajan. Patient states her HR was up in the 120's today. No fevers, CP, SOB, or abdominal pains. + cough since January.

## 2020-05-13 NOTE — ED PROVIDER NOTE - ATTENDING CONTRIBUTION TO CARE
71 yo female PMH hypothyroidism, a.fibrillation, HTN, sent here for cardioversion for a.fib.,  Patient reports that she has been in a.fib recently, her HR this morning was 120.  Denies Randhawa, CP, SOB, N/V/D, no leg pain or swelling.  Has a mild cough , but is has been present since January.  Well-appearing, female, NAD, PERRL, pink conjunctivae, mmm, nml work of breathing, lungs CTA b/l, equal air entry, speaking full sentences, RRR, well-perfused extremities., abdomen soft, NT/ND, no leg edema or calf ttp, A&Ox3, no focal neuro deficits.  ECG shows sinus bradycardia, will consult EP service.

## 2020-05-13 NOTE — ED PROVIDER NOTE - CLINICAL SUMMARY MEDICAL DECISION MAKING FREE TEXT BOX
71 yo female with paroxysmal a.fib, was supposed to be cardioverted today, found to be in NSR, seen by EP Dr Mahajan, stable for d/c home, no change in medications, will follow up with Dr Mahajan outpatient.

## 2020-05-13 NOTE — ED PROVIDER NOTE - OBJECTIVE STATEMENT
71 yo F hx of A-fib, hypothyroid, HTN, pleural effusion here for cardioversion with Dr. Mahajan. Patient states her HR was up in the 120's today. No fevers, CP, SOB, or abdominal pains. + cough since January.

## 2020-05-13 NOTE — ED PROVIDER NOTE - PHYSICAL EXAMINATION
Gen: Alert, NAD, well appearing  Head: NC, AT, PERRL, EOMI, normal lids/conjunctiva  ENT: normal hearing, patent oropharynx   Neck: +supple, no tenderness/meningismus,  Pulm: Bilateral BS, normal resp effort, no wheeze/stridor/retractions  CV: S1S2, clayton  Abd: soft, NT/ND  Mskel: no edema/erythema/cyanosis  Skin: no rash, warm/dry  Neuro: AAOx3, no sensory/motor deficits

## 2020-05-13 NOTE — ED PROVIDER NOTE - CARE PROVIDER_API CALL
your PMD,   Phone: (   )    -  Fax: (   )    -  Follow Up Time: 1-3 Days    Ashvin Mahajan  CARDIAC ELECTROPHYSIOLOGY  [address] [place] [id number]  Phone: [phone]  Fax: [phone]  Follow Up Time: 1-3 Days

## 2020-05-13 NOTE — ED PROVIDER NOTE - NSFOLLOWUPINSTRUCTIONS_ED_ALL_ED_FT
Atrial Fibrillation    Atrial fibrillation is a type of irregular or rapid heartbeat (arrhythmia). In atrial fibrillation, the top part of the heart (atria) quivers in a chaotic pattern. This makes the heart unable to pump blood normally. Having atrial fibrillation can increase your risk for other health problems, such as:  Blood can pool in the atria and form clots. If a clot travels to the brain, it can cause a stroke.  The heart muscle may weaken from the irregular blood flow. This can cause heart failure.  Atrial fibrillation may start suddenly and stop on its own, or it may become a long-lasting problem.    What are the causes?  This condition is caused by some heart-related conditions or procedures, including:  High blood pressure. This is the most common cause.  Heart failure.  Heart valve conditions.  Inflammation of the sac that surrounds the heart (pericarditis).  Heart surgery.  Coronary artery disease.  Certain heart rhythm disorders, such as Hummel–Parkinson–White syndrome.  Other causes include:  Pneumonia.  Obstructive sleep apnea.  Lung cancer.  Thyroid problems, especially if the thyroid is overactive (hyperthyroidism).  Excessive alcohol or drug use.  Sometimes, the cause of this condition is not known.    What increases the risk?  This condition is more likely to develop in:  Older people.  People who smoke.  People who have diabetes mellitus.  People who are overweight (obese).  Athletes who exercise vigorously.  People who have a family history.  What are the signs or symptoms?  Symptoms of this condition include:  A feeling that your heart is beating rapidly or irregularly.  A feeling of discomfort or pain in your chest.  Shortness of breath.  Sudden light-headedness or weakness.  Getting tired easily during exercise.  In some cases, there are no symptoms.    How is this diagnosed?  Your health care provider may be able to detect atrial fibrillation when taking your pulse. If detected, this condition may be diagnosed with:  Electrocardiogram (ECG).  Ambulatory cardiac monitor. This device records your heartbeats for 24 hours or more.  Transthoracic echocardiogram (TTE) to evaluate how blood flows through your heart.  Transesophageal echocardiogram (DEONDRE) to view more detailed images of your heart.  A stress test.  Imaging tests, such as a CT scan or chest X-ray.  Blood tests.  How is this treated?  This condition may be treated with:  Medicines to slow down the heart rate or bring the heart's rhythm back to normal.  Medicines to prevent blood clots from forming.  Electrical cardioversion. This delivers a low-energy shock to the heart to reset its rhythm.  Ablation. This procedure destroys the part of the heart tissue that sends abnormal signals.  Left atrial appendage occlusion/excision. This seals off a common place in the atria where blood clots can form (left atrial appendage).  The goal of treatment is to prevent blood clots from forming and to keep your heart beating at a normal rate and rhythm. Treatment depends on underlying medical conditions and how you feel when you are experiencing fibrillation.    Follow these instructions at home:  Medicines     Take over-the counter and prescription medicines only as told by your health care provider.  If your health care provider prescribed a blood-thinning medicine (anticoagulant), take it exactly as told. Taking too much blood-thinning medicine can cause bleeding. Taking too little can enable a blood clot to form and travel to the brain, causing a stroke.  Lifestyle     Image Image   Do not use any products that contain nicotine or tobacco, such as cigarettes and e-cigarettes. If you need help quitting, ask your health care provider.  Do not drink beverages that contain caffeine, such as coffee, soda, and tea.  Follow diet instructions as told by your health care provider.  Exercise regularly as told by your health care provider.  Do not drink alcohol.  General instructions     If you have obstructive sleep apnea, manage your condition as told by your health care provider.  Maintain a healthy weight. Do not use diet pills unless your health care provider approves. Diet pills may make heart problems worse.  Keep all follow-up visits as told by your health care provider. This is important.  Contact a health care provider if you:  Notice a change in the rate, rhythm, or strength of your heartbeat.  Are taking an anticoagulant and you notice increased bruising.  Tire more easily when you exercise or exert yourself.  Have a sudden change in weight.  Get help right away if you have:    Chest pain, abdominal pain, sweating, or weakness.  Difficulty breathing.  Blood in your vomit, stool (feces), or urine.  Any symptoms of a stroke. "BE FAST" is an easy way to remember the main warning signs of a stroke:  B - Balance. Signs are dizziness, sudden trouble walking, or loss of balance.  E - Eyes. Signs are trouble seeing or a sudden change in vision.  F - Face. Signs are sudden weakness or numbness of the face, or the face or eyelid drooping on one side.  A - Arms. Signs are weakness or numbness in an arm. This happens suddenly and usually on one side of the body.  S - Speech. Signs are sudden trouble speaking, slurred speech, or trouble understanding what people say.  T - Time. Time to call emergency services. Write down what time symptoms started.  Other signs of a stroke, such as:  A sudden, severe headache with no known cause.  Nausea or vomiting.  Seizure.  These symptoms may represent a serious problem that is an emergency. Do not wait to see if the symptoms will go away. Get medical help right away. Call your local emergency services (911 in the [place] Do not drive yourself to the hospital.     Summary  Atrial fibrillation is a type of irregular or rapid heartbeat (arrhythmia).  Symptoms include a feeling that your heart is beating fast or irregularly. In some cases, you may not have symptoms.  The condition is treated with medicines to slow down the heart rate or bring the heart's rhythm back to normal. You may also need blood-thinning medicines to prevent blood clots.  Get help right away if you have symptoms or signs of a stroke.  This information is not intended to replace advice given to you by your health care provider. Make sure you discuss any questions you have with your health care provider.    Document Released: 12/18/2006 Document Revised: 02/08/2019 Document Reviewed: 02/08/2019  [employer] Interactive Patient Education © 2019 [employer]

## 2020-05-29 ENCOUNTER — APPOINTMENT (OUTPATIENT)
Dept: CARDIOLOGY | Facility: CLINIC | Age: 70
End: 2020-05-29
Payer: MEDICARE

## 2020-05-29 VITALS
HEART RATE: 50 BPM | SYSTOLIC BLOOD PRESSURE: 132 MMHG | BODY MASS INDEX: 26.16 KG/M2 | WEIGHT: 143 LBS | TEMPERATURE: 98.8 F | DIASTOLIC BLOOD PRESSURE: 74 MMHG

## 2020-05-29 PROCEDURE — 93000 ELECTROCARDIOGRAM COMPLETE: CPT

## 2020-05-29 PROCEDURE — 99213 OFFICE O/P EST LOW 20 MIN: CPT

## 2020-06-04 ENCOUNTER — APPOINTMENT (OUTPATIENT)
Dept: HEART AND VASCULAR | Facility: CLINIC | Age: 70
End: 2020-06-04
Payer: MEDICARE

## 2020-06-04 PROCEDURE — 99204 OFFICE O/P NEW MOD 45 MIN: CPT | Mod: 95

## 2020-06-04 NOTE — REASON FOR VISIT
[Initial Evaluation] : an initial evaluation of [Atrial Fibrillation] : atrial fibrillation [FreeTextEntry1] : This 70-year-old woman with a history of paroxysmal atrial fibrillation primarily treated in [place] calls in for a telehealth visit for second opinion as to the management of atrial fibrillation. She states she's had multiple cardioversions and always converted back into atrial fibrillation. Most recently she was on her way for cardioversion when she spontaneously went into normal sinus rhythm and she states that a Holter monitor recorded a 4. second pause with several other pauses over 3 seconds. It is unclear whether these pauses occurred during atrial fibrillation or sinus rhythm. An echocardiogram revealed an ejection fraction of 41% which may be secondary to rapid atrial fibrillation. She had a CTA which did not reveal any significant coronary artery disease. She states that her pulse yesterday was around 100 but cannot elucidate whether it is regular or irregular. She denies chest pain, shortness of breath, palpitations, dizziness or syncope.

## 2020-06-05 NOTE — REASON FOR VISIT
[Follow-Up - From Hospitalization] : follow-up of a recent hospitalization for [Heart Failure] : congestive heart failure

## 2020-06-05 NOTE — HISTORY OF PRESENT ILLNESS
[FreeTextEntry1] : 68 y/o F with h/o HTN, AF, systolic HF, NICM recently diascharged from the hospital after she was admitted with acute on chronic systolic heart failure. Patient had been complaining of SOB with exertion for several months. She had been treated several times for suspected pneumonia. Upon admission she was found to be in atrial fibrillation with rapid ventricular response. a TTE showed LVEF 35 from 55%. CT coronaries showed clean coronary arteries without DEREJE thrombus. An attempt for DCCV failed while in the hospital, however, she converted spontaneously after discharged. She is on A/C with Eliquis. She was discharged on Entresto 24-26 mg bid and metoprolol XL 50 mg daily \par \par Patient is coming today for follow up. She reports feeling much better. Her breathing has improved significantly and she denies any pedal edema, nausea, vomiting, LOC. \par \par She claims to be compliant with low salt diet, meds. No ETOH or smoking reported. \par

## 2020-07-01 ENCOUNTER — APPOINTMENT (OUTPATIENT)
Dept: CARDIOLOGY | Facility: CLINIC | Age: 70
End: 2020-07-01
Payer: MEDICARE

## 2020-07-01 ENCOUNTER — APPOINTMENT (OUTPATIENT)
Dept: CARDIOLOGY | Facility: CLINIC | Age: 70
End: 2020-07-01

## 2020-07-01 VITALS
HEART RATE: 50 BPM | BODY MASS INDEX: 26.81 KG/M2 | DIASTOLIC BLOOD PRESSURE: 78 MMHG | TEMPERATURE: 97.4 F | HEIGHT: 61 IN | WEIGHT: 142 LBS | SYSTOLIC BLOOD PRESSURE: 146 MMHG

## 2020-07-01 PROCEDURE — 99213 OFFICE O/P EST LOW 20 MIN: CPT

## 2020-07-01 PROCEDURE — 93000 ELECTROCARDIOGRAM COMPLETE: CPT

## 2020-07-01 PROCEDURE — 93306 TTE W/DOPPLER COMPLETE: CPT | Mod: 59

## 2020-07-07 NOTE — PHYSICAL EXAM
[General Appearance - Well Developed] : well developed [Normal Appearance] : normal appearance [Normal Conjunctiva] : the conjunctiva exhibited no abnormalities [Normal Oral Mucosa] : normal oral mucosa [] : no respiratory distress [Auscultation Breath Sounds / Voice Sounds] : lungs were clear to auscultation bilaterally [Heart Rate And Rhythm] : heart rate and rhythm were normal [Heart Sounds] : normal S1 and S2 [Bowel Sounds] : normal bowel sounds [Skin Color & Pigmentation] : normal skin color and pigmentation [Abnormal Walk] : normal gait [Nail Clubbing] : no clubbing of the fingernails [Oriented To Time, Place, And Person] : oriented to person, place, and time [FreeTextEntry1] : no jvd

## 2020-07-07 NOTE — REVIEW OF SYSTEMS
[Recent Weight Loss (___ Lbs)] : recent [unfilled] ~Ulb weight loss [Negative] : Heme/Lymph [Shortness Of Breath] : no shortness of breath [Chest  Pressure] : no chest pressure [Chest Pain] : no chest pain [Lower Ext Edema] : no extremity edema

## 2020-07-07 NOTE — REASON FOR VISIT
[Other: _____] : [unfilled] [FreeTextEntry1] : This 70-year-old woman with a history of paroxysmal atrial fibrillation managed thru televisit d/t pandemic  had multiple cardioversions and always converted back into atrial fibrillation. Most recent attempt for another DCCV where she converted to SR on her own.   A Holter monitor recorded a 4.7 second pause with several other pauses over 3 seconds during atrial fibrillation. BB was adjusted accordingly.\par \par CARDIAC TESTING\par ECG (7/1/2020 50) sinus clayton  NV 152ms, QRS 98ms, QTC 451ms\par TTE (4/20/2020) 35-41%\par CHEST CTA ( 4/21/2020) no evidence of LA A thrombus, normal cors. Large bilateral pleural effusions and segmental atelectasis of the bilateral lower lobes\par \par Presented for follow up- she is in sinus, denies sob, palpitations, dizziness or syncope\par

## 2020-07-07 NOTE — ASSESSMENT
[FreeTextEntry1] : A/P\par 70 years with PMH of persistent Afib, HTN, chronic CHF, thyroid disease\par \par PAF ( Alexx vasc score = 4 ) chf,htn, age, f)\par c/w amiodarone 200mg daily\par c/w eliquis 5mg twice daily- patient denies any bleeding problem\par c/w metoprolol succ 25mg daily\par \par Chronic systolic heart failure EF of 35-40%\par Being followed by Dr. Richard\par On entresto, furosemide\par \par Tyroid disease\par on levothyroxine\par \par Follow up in 4 months with Dr. Mahajan\par check TSH next visit\par

## 2020-07-24 ENCOUNTER — APPOINTMENT (OUTPATIENT)
Dept: CARDIOLOGY | Facility: CLINIC | Age: 70
End: 2020-07-24
Payer: MEDICARE

## 2020-07-24 VITALS
OXYGEN SATURATION: 95 % | BODY MASS INDEX: 27.19 KG/M2 | WEIGHT: 144 LBS | HEART RATE: 49 BPM | TEMPERATURE: 98.6 F | DIASTOLIC BLOOD PRESSURE: 74 MMHG | HEIGHT: 61 IN | SYSTOLIC BLOOD PRESSURE: 124 MMHG

## 2020-07-24 PROCEDURE — 99214 OFFICE O/P EST MOD 30 MIN: CPT

## 2020-07-24 PROCEDURE — 93000 ELECTROCARDIOGRAM COMPLETE: CPT

## 2020-07-24 RX ORDER — FUROSEMIDE 40 MG/4ML
40 SOLUTION ORAL DAILY
Refills: 0 | Status: DISCONTINUED | COMMUNITY
End: 2020-07-24

## 2020-07-29 NOTE — HISTORY OF PRESENT ILLNESS
[FreeTextEntry1] : 70 y/o F with h/o HTN, AF, systolic HF, NICM coming for follow up of systolic heart failure. Earlier this year she was admitted with decompensated HF with LVEF ~35% from normal previously. She was found to to have atrial fibrillation with RVR. She failed DCCV but later converted spontaneously in the outpaitent. \par \par today, patient feels well. She denies any significant SOB, chest pain, palpitation on regular exertion. She is compliant with meds and low salt diet. A recent repeat echo her LVEF recovered to ~ 50%.

## 2020-08-07 ENCOUNTER — RX RENEWAL (OUTPATIENT)
Age: 70
End: 2020-08-07

## 2020-09-03 ENCOUNTER — APPOINTMENT (OUTPATIENT)
Dept: GYNECOLOGIC ONCOLOGY | Facility: CLINIC | Age: 70
End: 2020-09-03

## 2020-10-23 ENCOUNTER — APPOINTMENT (OUTPATIENT)
Dept: CARDIOLOGY | Facility: CLINIC | Age: 70
End: 2020-10-23
Payer: MEDICARE

## 2020-10-23 VITALS
WEIGHT: 146 LBS | HEIGHT: 61 IN | HEART RATE: 49 BPM | SYSTOLIC BLOOD PRESSURE: 140 MMHG | TEMPERATURE: 98.8 F | BODY MASS INDEX: 27.56 KG/M2 | DIASTOLIC BLOOD PRESSURE: 80 MMHG | OXYGEN SATURATION: 98 %

## 2020-10-23 PROCEDURE — 99214 OFFICE O/P EST MOD 30 MIN: CPT

## 2020-10-23 PROCEDURE — 93000 ELECTROCARDIOGRAM COMPLETE: CPT

## 2020-10-23 RX ORDER — LEVOTHYROXINE SODIUM 0.05 MG/1
50 TABLET ORAL DAILY
Refills: 0 | Status: COMPLETED | COMMUNITY
End: 2020-10-23

## 2020-10-26 RX ORDER — METOPROLOL SUCCINATE 50 MG/1
50 TABLET, EXTENDED RELEASE ORAL
Qty: 90 | Refills: 3 | Status: DISCONTINUED | COMMUNITY
Start: 2020-08-07 | End: 2020-10-26

## 2020-10-26 NOTE — HISTORY OF PRESENT ILLNESS
[FreeTextEntry1] : 70 y/o F with h/o HTN, AF, systolic HF, NICM coming for follow up of systolic heart failure. Earlier this year she was admitted with decompensated HF with LVEF ~35% from normal previously. She was found to to have atrial fibrillation with RVR. She failed DCCV but later converted spontaneously in the outpatient. \par \par Patient comes for follow up. She reports no limitation to regular activities. She is compliant with low salt diet and meds. \par

## 2020-11-06 ENCOUNTER — APPOINTMENT (OUTPATIENT)
Dept: CARDIOLOGY | Facility: CLINIC | Age: 70
End: 2020-11-06
Payer: MEDICARE

## 2020-11-06 ENCOUNTER — NON-APPOINTMENT (OUTPATIENT)
Age: 70
End: 2020-11-06

## 2020-11-06 VITALS — WEIGHT: 142 LBS | TEMPERATURE: 95.6 F | HEIGHT: 61 IN | BODY MASS INDEX: 26.81 KG/M2 | HEART RATE: 48 BPM

## 2020-11-06 PROCEDURE — 93000 ELECTROCARDIOGRAM COMPLETE: CPT

## 2020-11-06 PROCEDURE — 99214 OFFICE O/P EST MOD 30 MIN: CPT

## 2020-11-06 RX ORDER — FUROSEMIDE 20 MG/1
20 TABLET ORAL
Qty: 30 | Refills: 2 | Status: COMPLETED | COMMUNITY
Start: 2020-07-24 | End: 2020-11-06

## 2020-11-06 RX ORDER — AMIODARONE HYDROCHLORIDE 200 MG/1
200 TABLET ORAL
Qty: 90 | Refills: 3 | Status: COMPLETED | COMMUNITY
End: 2020-11-06

## 2020-11-06 NOTE — PHYSICAL EXAM
[Normal Oral Mucosa] : normal oral mucosa [No Oral Pallor] : no oral pallor [No Oral Cyanosis] : no oral cyanosis [Normal Jugular Venous A Waves Present] : normal jugular venous A waves present [Normal Jugular Venous V Waves Present] : normal jugular venous V waves present [No Jugular Venous Causey A Waves] : no jugular venous causey A waves [Abnormal Walk] : normal gait [Gait - Sufficient For Exercise Testing] : the gait was sufficient for exercise testing [Nail Clubbing] : no clubbing of the fingernails [Cyanosis, Localized] : no localized cyanosis [Petechial Hemorrhages (___cm)] : no petechial hemorrhages [Respiration, Rhythm And Depth] : normal respiratory rhythm and effort [Exaggerated Use Of Accessory Muscles For Inspiration] : no accessory muscle use [Auscultation Breath Sounds / Voice Sounds] : lungs were clear to auscultation bilaterally [Heart Rate And Rhythm] : heart rate and rhythm were normal [Heart Sounds] : normal S1 and S2 [Murmurs] : no murmurs present [Abdomen Soft] : soft [Abdomen Tenderness] : non-tender [] : no hepato-splenomegaly [Abdomen Mass (___ Cm)] : no abdominal mass palpated

## 2020-11-06 NOTE — HISTORY OF PRESENT ILLNESS
[FreeTextEntry1] : Patient with history of hypertension was recently admitted to hospital with new onset of atrial fibrillation with rapid ventricular response and found to have ejection fraction of 30%. Patient also found to have pleural effusions which resulted Lasix. Cardioversion was attempted on while started amiodarone however was unable to maintain normal sinus rhythm.\par Patient was discharged home on amiodarone and metoprolol. Patient had one episode of bradycardia recorded by event monitor and metal probe was decreased. Today patients feels fine does not complain of palpitations. However, patient heart rate is elevated 110 bpm.\par \par Patietn is doig well, No bleeding, no CP, no SOB and syncope\par \par EKG SR 48 bpm

## 2021-01-05 LAB
ANION GAP SERPL CALC-SCNC: 12 MMOL/L
BUN SERPL-MCNC: 14 MG/DL
CALCIUM SERPL-MCNC: 10.1 MG/DL
CHLORIDE SERPL-SCNC: 106 MMOL/L
CO2 SERPL-SCNC: 26 MMOL/L
CREAT SERPL-MCNC: 1 MG/DL
GLUCOSE SERPL-MCNC: 90 MG/DL
POTASSIUM SERPL-SCNC: 5 MMOL/L
SODIUM SERPL-SCNC: 144 MMOL/L

## 2021-01-08 ENCOUNTER — APPOINTMENT (OUTPATIENT)
Dept: CARDIOLOGY | Facility: CLINIC | Age: 71
End: 2021-01-08
Payer: MEDICARE

## 2021-01-08 PROCEDURE — 93975 VASCULAR STUDY: CPT

## 2021-01-14 NOTE — CONSULT NOTE ADULT - PROBLEM SELECTOR PROBLEM 1
Acute on chronic systolic heart failure, NYHA class 3 Double Island Pedicle Flap Text: The defect edges were debeveled with a #15 scalpel blade.  Given the location of the defect, shape of the defect and the proximity to free margins a double island pedicle advancement flap was deemed most appropriate.  Using a sterile surgical marker, an appropriate advancement flap was drawn incorporating the defect, outlining the appropriate donor tissue and placing the expected incisions within the relaxed skin tension lines where possible.    The area thus outlined was incised deep to adipose tissue with a #15 scalpel blade.  The skin margins were undermined to an appropriate distance in all directions around the primary defect and laterally outward around the island pedicle utilizing iris scissors.  There was minimal undermining beneath the pedicle flap.

## 2021-01-15 ENCOUNTER — APPOINTMENT (OUTPATIENT)
Dept: CARDIOLOGY | Facility: CLINIC | Age: 71
End: 2021-01-15
Payer: MEDICARE

## 2021-01-15 VITALS
WEIGHT: 152 LBS | SYSTOLIC BLOOD PRESSURE: 150 MMHG | HEIGHT: 61 IN | BODY MASS INDEX: 28.7 KG/M2 | HEART RATE: 65 BPM | DIASTOLIC BLOOD PRESSURE: 80 MMHG

## 2021-01-15 PROCEDURE — 93000 ELECTROCARDIOGRAM COMPLETE: CPT

## 2021-01-15 PROCEDURE — 99214 OFFICE O/P EST MOD 30 MIN: CPT

## 2021-01-20 NOTE — HISTORY OF PRESENT ILLNESS
[FreeTextEntry1] : 71 y/o F with h/o HTN, AF, systolic HF, NICM coming for follow up of systolic heart failure with LVEF ~35% from normal previously. She was found to to have atrial fibrillation with RVR. She failed DCCV but later converted spontaneously in the outpatient. \par \par Patient comes for follow up. She reports no limitation to regular activities. Her SBP has been elevated for several weeks, however she was not taking her medications as indicated. Her weight has been stable at home around 150 lbs. She denies CP, SOB at rest, PND, abdominal discomfort, LH/dizziness, palpitations, and syncope. \par \par She is compliant with low salt diet\par

## 2021-02-05 ENCOUNTER — APPOINTMENT (OUTPATIENT)
Dept: CARDIOLOGY | Facility: CLINIC | Age: 71
End: 2021-02-05
Payer: MEDICARE

## 2021-02-05 ENCOUNTER — APPOINTMENT (OUTPATIENT)
Dept: CARDIOLOGY | Facility: CLINIC | Age: 71
End: 2021-02-05

## 2021-02-05 VITALS
TEMPERATURE: 97.2 F | HEIGHT: 61 IN | SYSTOLIC BLOOD PRESSURE: 120 MMHG | WEIGHT: 153 LBS | HEART RATE: 52 BPM | BODY MASS INDEX: 28.89 KG/M2 | DIASTOLIC BLOOD PRESSURE: 70 MMHG

## 2021-02-05 PROCEDURE — 93000 ELECTROCARDIOGRAM COMPLETE: CPT

## 2021-02-05 PROCEDURE — 99214 OFFICE O/P EST MOD 30 MIN: CPT

## 2021-02-05 NOTE — ASSESSMENT
[FreeTextEntry1] : AF with RVR - in NSR doing well\par - cont AC\par - cont amio\par - discussed possibility of ablation to DC amio; pt want ot wait for now.\par - agree with repeat echo but EF in 2020 was normal (improved after mainance of SR)\par \par \par Bradycardia - asymptotic doing well\par - event monior\par - cont BB

## 2021-02-05 NOTE — HISTORY OF PRESENT ILLNESS
[FreeTextEntry1] : Patient with history of hypertension was recently admitted to hospital with new onset of atrial fibrillation with rapid ventricular response and found to have ejection fraction of 30%. Patient also found to have pleural effusions which resulted Lasix. Cardioversion was attempted on while started amiodarone however was unable to maintain normal sinus rhythm.\par Patient was discharged home on amiodarone and metoprolol. Patient had one episode of bradycardia recorded by event monitor and metal probe was decreased. Today patients feels fine does not complain of palpitations. However, patient heart rate is elevated 110 bpm.\par \par Patietn is doig well, No bleeding, no CP, no SOB and syncope\par \par EKG SR 52 bpm\par event monior - no AF; HR  (at night clayton) average 54\par normal EF 7/20

## 2021-02-05 NOTE — PHYSICAL EXAM
[Normal Oral Mucosa] : normal oral mucosa [No Oral Pallor] : no oral pallor [No Oral Cyanosis] : no oral cyanosis [Normal Jugular Venous A Waves Present] : normal jugular venous A waves present [Normal Jugular Venous V Waves Present] : normal jugular venous V waves present [No Jugular Venous Causey A Waves] : no jugular venous causey A waves [Respiration, Rhythm And Depth] : normal respiratory rhythm and effort [Exaggerated Use Of Accessory Muscles For Inspiration] : no accessory muscle use [Auscultation Breath Sounds / Voice Sounds] : lungs were clear to auscultation bilaterally [Heart Rate And Rhythm] : heart rate and rhythm were normal [Heart Sounds] : normal S1 and S2 [Murmurs] : no murmurs present [Abdomen Soft] : soft [Abdomen Tenderness] : non-tender [Abdomen Mass (___ Cm)] : no abdominal mass palpated [Abnormal Walk] : normal gait [Gait - Sufficient For Exercise Testing] : the gait was sufficient for exercise testing [Nail Clubbing] : no clubbing of the fingernails [Petechial Hemorrhages (___cm)] : no petechial hemorrhages [Cyanosis, Localized] : no localized cyanosis [] : no ischemic changes

## 2021-02-08 ENCOUNTER — APPOINTMENT (OUTPATIENT)
Dept: CARDIOLOGY | Facility: CLINIC | Age: 71
End: 2021-02-08
Payer: MEDICARE

## 2021-02-08 PROCEDURE — 93308 TTE F-UP OR LMTD: CPT

## 2021-02-26 ENCOUNTER — APPOINTMENT (OUTPATIENT)
Dept: CARDIOLOGY | Facility: CLINIC | Age: 71
End: 2021-02-26
Payer: MEDICARE

## 2021-02-26 VITALS
OXYGEN SATURATION: 99 % | TEMPERATURE: 97 F | BODY MASS INDEX: 28.7 KG/M2 | HEIGHT: 61 IN | DIASTOLIC BLOOD PRESSURE: 82 MMHG | WEIGHT: 152 LBS | SYSTOLIC BLOOD PRESSURE: 130 MMHG | HEART RATE: 52 BPM

## 2021-02-26 DIAGNOSIS — Z00.00 ENCOUNTER FOR GENERAL ADULT MEDICAL EXAMINATION W/OUT ABNORMAL FINDINGS: ICD-10-CM

## 2021-02-26 PROCEDURE — 99213 OFFICE O/P EST LOW 20 MIN: CPT

## 2021-02-26 PROCEDURE — 93000 ELECTROCARDIOGRAM COMPLETE: CPT

## 2021-03-02 NOTE — HISTORY OF PRESENT ILLNESS
[FreeTextEntry1] : 71 y/o F with h/o HTN, AF, systolic HF, NICM coming for follow up of systolic heart failure with LVEF ~35% from normal previously. She was found to to have atrial fibrillation with RVR. She failed DCCV but later converted spontaneously in the outpatient. \par \par Patient comes for follow up, Patient states she is feeling great, she is working from home, and is helping taking care of her parents as well. Her SBP has been stable at 130's and her weight has been stable around 152 lbs at home. She denies CP, SOB at rest, PND, abdominal discomfort, LH/dizziness, palpitations, and syncope. \par \par She is compliant with low salt diet\par

## 2021-03-09 DIAGNOSIS — Z23 ENCOUNTER FOR IMMUNIZATION: ICD-10-CM

## 2021-05-03 ENCOUNTER — APPOINTMENT (OUTPATIENT)
Dept: GYNECOLOGIC ONCOLOGY | Facility: CLINIC | Age: 71
End: 2021-05-03

## 2021-05-07 ENCOUNTER — APPOINTMENT (OUTPATIENT)
Dept: CARDIOLOGY | Facility: CLINIC | Age: 71
End: 2021-05-07
Payer: MEDICARE

## 2021-05-07 VITALS
TEMPERATURE: 98 F | DIASTOLIC BLOOD PRESSURE: 80 MMHG | HEIGHT: 61 IN | SYSTOLIC BLOOD PRESSURE: 122 MMHG | HEART RATE: 51 BPM | WEIGHT: 152 LBS | BODY MASS INDEX: 28.7 KG/M2 | OXYGEN SATURATION: 98 %

## 2021-05-07 PROCEDURE — 93000 ELECTROCARDIOGRAM COMPLETE: CPT

## 2021-05-07 PROCEDURE — 99213 OFFICE O/P EST LOW 20 MIN: CPT

## 2021-05-07 RX ORDER — FUROSEMIDE 20 MG/1
20 TABLET ORAL
Qty: 30 | Refills: 1 | Status: DISCONTINUED | COMMUNITY
Start: 2021-02-26 | End: 2021-05-07

## 2021-05-07 RX ORDER — PATIROMER 8.4 G/1
8.4 POWDER, FOR SUSPENSION ORAL
Qty: 30 | Refills: 4 | Status: DISCONTINUED | COMMUNITY
Start: 2021-01-05 | End: 2021-05-07

## 2021-05-14 NOTE — HISTORY OF PRESENT ILLNESS
[FreeTextEntry1] : 70 y/o F with h/o HTN, AF, systolic HF, NICM coming for follow up of systolic heart failure with LVEF ~35% from normal previously. She was found to to have atrial fibrillation with RVR. She failed DCCV but later converted spontaneously in the outpatient. She is here for a follow up.\par \par Patient endorses no limitations on her activity tolerance. She has not felt any SOB lately. Her SBP ranges from 130's - 150's, and her weight has been stable around 152 lbs at home. She denies CP, SOB at rest, PND, abdominal discomfort, LH/dizziness, palpitations, and syncope. \par \par She is not following a low sodium diet on a strict sense, she takes her medications as indicated\par

## 2021-06-25 ENCOUNTER — APPOINTMENT (OUTPATIENT)
Dept: CARDIOLOGY | Facility: CLINIC | Age: 71
End: 2021-06-25
Payer: MEDICARE

## 2021-06-25 VITALS
RESPIRATION RATE: 18 BRPM | TEMPERATURE: 98 F | OXYGEN SATURATION: 98 % | HEART RATE: 48 BPM | SYSTOLIC BLOOD PRESSURE: 144 MMHG | BODY MASS INDEX: 28.13 KG/M2 | WEIGHT: 149 LBS | DIASTOLIC BLOOD PRESSURE: 79 MMHG | HEIGHT: 61 IN

## 2021-06-25 PROCEDURE — 93000 ELECTROCARDIOGRAM COMPLETE: CPT

## 2021-06-25 PROCEDURE — 99214 OFFICE O/P EST MOD 30 MIN: CPT

## 2021-06-25 RX ORDER — AMIODARONE HYDROCHLORIDE 100 MG/1
100 TABLET ORAL DAILY
Qty: 90 | Refills: 3 | Status: DISCONTINUED | COMMUNITY
Start: 1900-01-01 | End: 2021-06-25

## 2021-06-25 NOTE — HISTORY OF PRESENT ILLNESS
[FreeTextEntry1] : Patient with history of hypertension was recently admitted to hospital with new onset of atrial fibrillation with rapid ventricular response and found to have ejection fraction of 30%. Patient also found to have pleural effusions which resulted Lasix. Cardioversion was attempted on while started amiodarone however was unable to maintain normal sinus rhythm.\par Patient was discharged home on amiodarone and metoprolol. Patient had one episode of bradycardia recorded by event monitor and metal probe was decreased. Today patients feels fine does not complain of palpitations. However, patient heart rate is elevated 110 bpm.\par \par Patient is doing well, No bleeding, no CP, no SOB and syncope\par \par EKG SR 44 bpm QTc QTc 460 \par 2/21 - echo EF 60%\par event monior - no AF; HR  (at night clayton) average 54\par normal EF 7/20

## 2021-06-25 NOTE — PHYSICAL EXAM
[Normal Oral Mucosa] : normal oral mucosa [No Oral Pallor] : no oral pallor [No Oral Cyanosis] : no oral cyanosis [Normal Jugular Venous A Waves Present] : normal jugular venous A waves present [Normal Jugular Venous V Waves Present] : normal jugular venous V waves present [No Jugular Venous Causey A Waves] : no jugular venous causey A waves [Respiration, Rhythm And Depth] : normal respiratory rhythm and effort [Exaggerated Use Of Accessory Muscles For Inspiration] : no accessory muscle use [Auscultation Breath Sounds / Voice Sounds] : lungs were clear to auscultation bilaterally [Heart Rate And Rhythm] : heart rate and rhythm were normal [Heart Sounds] : normal S1 and S2 [Murmurs] : no murmurs present [Abdomen Soft] : soft [Abdomen Tenderness] : non-tender [Abdomen Mass (___ Cm)] : no abdominal mass palpated [Abnormal Walk] : normal gait [Gait - Sufficient For Exercise Testing] : the gait was sufficient for exercise testing [Nail Clubbing] : no clubbing of the fingernails [Cyanosis, Localized] : no localized cyanosis [Petechial Hemorrhages (___cm)] : no petechial hemorrhages [] : no ischemic changes

## 2021-07-09 ENCOUNTER — APPOINTMENT (OUTPATIENT)
Dept: CARDIOLOGY | Facility: CLINIC | Age: 71
End: 2021-07-09
Payer: MEDICARE

## 2021-07-09 ENCOUNTER — RESULT CHARGE (OUTPATIENT)
Age: 71
End: 2021-07-09

## 2021-07-09 VITALS
WEIGHT: 148 LBS | BODY MASS INDEX: 27.94 KG/M2 | TEMPERATURE: 97.9 F | OXYGEN SATURATION: 98 % | DIASTOLIC BLOOD PRESSURE: 70 MMHG | HEART RATE: 49 BPM | HEIGHT: 61 IN | SYSTOLIC BLOOD PRESSURE: 100 MMHG

## 2021-07-09 PROCEDURE — 99213 OFFICE O/P EST LOW 20 MIN: CPT

## 2021-07-09 PROCEDURE — 93000 ELECTROCARDIOGRAM COMPLETE: CPT

## 2021-07-09 NOTE — PHYSICAL EXAM
[Well Developed] : well developed [No Acute Distress] : no acute distress [Normal Conjunctiva] : normal conjunctiva [Normal Venous Pressure] : normal venous pressure [Normal S1, S2] : normal S1, S2 [No Murmur] : no murmur [Clear Lung Fields] : clear lung fields [Good Air Entry] : good air entry [No Respiratory Distress] : no respiratory distress  [Soft] : abdomen soft [Non Tender] : non-tender [Normal Gait] : normal gait [No Edema] : no edema [No Rash] : no rash [Moves all extremities] : moves all extremities [No Focal Deficits] : no focal deficits [Alert and Oriented] : alert and oriented [Normal memory] : normal memory

## 2021-07-12 NOTE — ASSESSMENT
[FreeTextEntry1] : 71 year old female patient presenting for follow up.\par \par # Chronic systolic heart failure /Recovered LVEF/ hyperkalemia/ Hypertension\par \par - Euvolemic on exam, clinically well and asymptomatic \par - Continue Entresto 97/103 mg bid \par - Continue metoprolol XL 12.5 mg daily - ECG shows HR- ECG shows sinus rhythm in 50s \par - Tapering Lokelma, now three times a week, repeat BMP in two weeks and re-assess \par - Eliquis 5 mg BID\par - Encouraged to continue Low sodium diet given it has been effective\par - RTO in 6 months\par \par \par \par Christo Robertson MD \par Advanced Heart Failure/ Mechanical Circulatory Support\par Pulmonary Hypertension and Cardiac Amyloidosis \par [facility]\par [facility]  \par \par

## 2021-07-12 NOTE — HISTORY OF PRESENT ILLNESS
[FreeTextEntry1] : 71 year old female patient presenting for follow up.\par \par Known to have had a history od NICM with LVEF 35% and fully recovered now at 60%, history of atrial fibrillation cardioverted on amiodarone follows with Dr. Mahajan, now off amiodarone, history of hypertension presenting for followup.\par Patient's blood pressure had been previously ill-controlled, lab workup for secondary hypertension were done and all returned normal results. Patient had been compliant with her diet lately and current records of blood pressures are within normal limits. \par She denies CP, SOB at rest, PND, abdominal discomfort, LH/dizziness, palpitations, and syncope. \par \par

## 2021-09-24 ENCOUNTER — APPOINTMENT (OUTPATIENT)
Dept: CARDIOLOGY | Facility: CLINIC | Age: 71
End: 2021-09-24
Payer: MEDICARE

## 2021-09-24 VITALS
HEART RATE: 53 BPM | TEMPERATURE: 97.8 F | WEIGHT: 147 LBS | SYSTOLIC BLOOD PRESSURE: 117 MMHG | RESPIRATION RATE: 16 BRPM | HEIGHT: 61 IN | DIASTOLIC BLOOD PRESSURE: 69 MMHG | OXYGEN SATURATION: 99 % | BODY MASS INDEX: 27.75 KG/M2

## 2021-09-24 PROCEDURE — 99213 OFFICE O/P EST LOW 20 MIN: CPT

## 2021-09-24 PROCEDURE — 93000 ELECTROCARDIOGRAM COMPLETE: CPT

## 2021-09-24 NOTE — PHYSICAL EXAM
[No Oral Pallor] : no oral pallor [Normal Oral Mucosa] : normal oral mucosa [No Oral Cyanosis] : no oral cyanosis [Normal Jugular Venous A Waves Present] : normal jugular venous A waves present [Normal Jugular Venous V Waves Present] : normal jugular venous V waves present [No Jugular Venous Causey A Waves] : no jugular venous causey A waves [Respiration, Rhythm And Depth] : normal respiratory rhythm and effort [Exaggerated Use Of Accessory Muscles For Inspiration] : no accessory muscle use [Auscultation Breath Sounds / Voice Sounds] : lungs were clear to auscultation bilaterally [Heart Rate And Rhythm] : heart rate and rhythm were normal [Heart Sounds] : normal S1 and S2 [Murmurs] : no murmurs present [Abdomen Soft] : soft [Abdomen Tenderness] : non-tender [Abdomen Mass (___ Cm)] : no abdominal mass palpated [Abnormal Walk] : normal gait [Gait - Sufficient For Exercise Testing] : the gait was sufficient for exercise testing [Nail Clubbing] : no clubbing of the fingernails [Cyanosis, Localized] : no localized cyanosis [Petechial Hemorrhages (___cm)] : no petechial hemorrhages [] : no ischemic changes

## 2021-09-24 NOTE — HISTORY OF PRESENT ILLNESS
[FreeTextEntry1] : Patient with history of hypertension was recently admitted to hospital with new onset of atrial fibrillation with rapid ventricular response and found to have ejection fraction of 30%. Patient also found to have pleural effusions which resulted Lasix. Cardioversion was attempted on while started amiodarone however was unable to maintain normal sinus rhythm.\par Patient was discharged home on amiodarone and metoprolol. Patient had one episode of bradycardia recorded by event monitor and metal probe was decreased. Today patients feels fine does not complain of palpitations. However, patient heart rate is elevated 110 bpm.\par \par Patient is doing well, No bleeding, no CP, no SOB and syncope\par \par EKG SR 51 bpm QTc QTc 430 \par 2/21 - echo EF 60%\par event monior - no AF; HR  (at night clayton) average 54\par normal EF 7/20

## 2021-09-24 NOTE — ASSESSMENT
[FreeTextEntry1] : AF with RVR - in NSR doing well\par - cont AC\par -- significant improvement in EF to normal \par \par \par Bradycardia - asymptotic doing well; HR impoved\par - event monior [address] rate of 54 bpm\par - cont BB low dose - will decrease

## 2021-09-28 ENCOUNTER — RX RENEWAL (OUTPATIENT)
Age: 71
End: 2021-09-28

## 2021-11-19 ENCOUNTER — APPOINTMENT (OUTPATIENT)
Dept: CARDIOLOGY | Facility: CLINIC | Age: 71
End: 2021-11-19
Payer: MEDICARE

## 2021-11-19 VITALS
DIASTOLIC BLOOD PRESSURE: 80 MMHG | HEART RATE: 53 BPM | SYSTOLIC BLOOD PRESSURE: 116 MMHG | OXYGEN SATURATION: 97 % | WEIGHT: 154 LBS | HEIGHT: 61 IN | RESPIRATION RATE: 16 BRPM | BODY MASS INDEX: 29.07 KG/M2

## 2021-11-19 PROCEDURE — 99214 OFFICE O/P EST MOD 30 MIN: CPT

## 2021-11-19 PROCEDURE — 93000 ELECTROCARDIOGRAM COMPLETE: CPT

## 2021-11-19 NOTE — ASSESSMENT
[FreeTextEntry1] : 71 year old female patient presenting for follow up.\par \par # Chronic systolic heart failure /Recovered LVEF/ hyperkalemia/ Hypertension\par \par - Euvolemic on exam, clinically well and asymptomatic - IVC <2 cm and collapsing \par - Continue Entresto 97/103 mg bid \par - Continue metoprolol XL 12.5 mg daily - ECG shows HR- ECG shows sinus rhythm in 53 bpm\par - DC Lokelma, taking it twice per week - recent potassium 4.1 \par - BMP in 2 weeks to assess potassium level off Lokelma\par - Eliquis 5 mg BID\par - Encouraged to continue Low sodium diet given it has been effective\par - Follow up with PMD\par - RTO in 6 months\par \par \par Christo Robertson MD, FAC, [facility] \par Advanced Heart Failure/ Mechanical Circulatory Support\par Pulmonary Hypertension and Cardiac Amyloidosis \par [facility]\par [facility]  \par \par \par

## 2021-11-19 NOTE — HISTORY OF PRESENT ILLNESS
[FreeTextEntry1] : 71 year old female patient presenting for follow up.\par \par Known to have had a history od NICM with LVEF 35% and fully recovered now at 60%, history of atrial fibrillation cardioverted on amiodarone follows with Dr. Mahajan, now off amiodarone, history of hypertension presenting for follow up. She has no limitation to exercise. She does her chores and takes care of her grandkids. \par \par Very rarely, he BP goes up to 160s, however, she is not fully compliant with diet. \par \par She is mostly compliant with low salt diet. \par

## 2022-01-14 NOTE — ED ADULT NURSE NOTE - PAIN: PRESENCE, MLM
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 Solid bore small needle, scant blood, no active bleeding in ED. Pt. washed and came to ED to report needlestick, states source pt. had blood draw as well. Source pt. not being treated for infection and with no known infectious illness. Pt. denies PMH or allergy, states other vaccinations UTD (Hep/tetanus). denies pain/discomfort

## 2022-03-01 ENCOUNTER — EMERGENCY (EMERGENCY)
Facility: HOSPITAL | Age: 72
LOS: 0 days | Discharge: HOME | End: 2022-03-02
Attending: EMERGENCY MEDICINE | Admitting: EMERGENCY MEDICINE
Payer: MEDICARE

## 2022-03-01 VITALS
DIASTOLIC BLOOD PRESSURE: 85 MMHG | WEIGHT: 149.91 LBS | HEIGHT: 61 IN | RESPIRATION RATE: 18 BRPM | TEMPERATURE: 96 F | SYSTOLIC BLOOD PRESSURE: 146 MMHG | OXYGEN SATURATION: 99 % | HEART RATE: 60 BPM

## 2022-03-01 DIAGNOSIS — Z90.49 ACQUIRED ABSENCE OF OTHER SPECIFIED PARTS OF DIGESTIVE TRACT: Chronic | ICD-10-CM

## 2022-03-01 PROCEDURE — 99284 EMERGENCY DEPT VISIT MOD MDM: CPT

## 2022-03-01 NOTE — ED ADULT NURSE NOTE - OBJECTIVE STATEMENT
71yr F presented to ED c/o HTN upon checking her blood pressure at home. States feeling nervous, so checked BP and it was high. Reports feeling better now.

## 2022-03-01 NOTE — ED PROVIDER NOTE - TOBACCO USE
Never smoker Methotrexate Counseling:  Patient counseled regarding adverse effects of methotrexate including but not limited to nausea, vomiting, abnormalities in liver function tests. Patients may develop mouth sores, rash, diarrhea, and abnormalities in blood counts. The patient understands that monitoring is required including LFT's and blood counts.  There is a rare possibility of scarring of the liver and lung problems that can occur when taking methotrexate. Persistent nausea, loss of appetite, pale stools, dark urine, cough, and shortness of breath should be reported immediately. Patient advised to discontinue methotrexate treatment at least three months before attempting to become pregnant.  I discussed the need for folate supplements while taking methotrexate.  These supplements can decrease side effects during methotrexate treatment. The patient verbalized understanding of the proper use and possible adverse effects of methotrexate.  All of the patient's questions and concerns were addressed.

## 2022-03-02 PROCEDURE — 93010 ELECTROCARDIOGRAM REPORT: CPT

## 2022-03-02 NOTE — ED PROVIDER NOTE - PHYSICAL EXAMINATION
VITAL SIGNS: I have reviewed nursing notes and confirm.  CONSTITUTIONAL: Well-developed; well-nourished; in no acute distress.  SKIN: Skin exam is warm and dry, no acute rash.  HEAD: Normocephalic; atraumatic.  EYES: PERRL, EOM intact; conjunctiva and sclera clear.  ENT: No nasal discharge; airway clear. TMs clear.  NECK: Supple; non tender.  CARD: S1, S2 normal, not currently in afib, Regular rate and rhythm.  RESP: No wheezes, rales or rhonchi.  ABD: Normal bowel sounds; soft; non-distended; non-tender  EXT: Normal ROM. No calf ttp, asymmetry, no pitting edema  NEURO: Alert, oriented. Grossly unremarkable. No focal deficits.  PSYCH: Cooperative, appropriate.

## 2022-03-02 NOTE — ED PROVIDER NOTE - PATIENT PORTAL LINK FT
You can access the FollowMyHealth Patient Portal offered by [facility] by registering at the following website: [email or web address]. By joining [employer]’s FollowMyHealth portal, you will also be able to view your health information using other applications (apps) compatible with our system.

## 2022-03-02 NOTE — ED PROVIDER NOTE - CARE PROVIDER_API CALL
Christo Up (MD; MD)  Adv Heart Fail Trnsplnt Cardio; Cardiovascular Disease; Internal Medicine  [address] [place] [address]  Phone: [phone]  Fax: [phone]  Follow Up Time: Routine

## 2022-03-02 NOTE — ED PROVIDER NOTE - CLINICAL SUMMARY MEDICAL DECISION MAKING FREE TEXT BOX
BP in ED is normal, patient is currently and has been asymptomatic.    Advised on daily BP monitoring x 1 only, continued follow up with Dr. Richard and Zaina, return precautions, signs of HTNsive emergency.

## 2022-03-02 NOTE — ED PROVIDER NOTE - NSFOLLOWUPINSTRUCTIONS_ED_ALL_ED_FT
Please check your blood pressure once a day and keep a record of it.    If you develop severe HA, nausea, dizziness, chest pain, shortness of breath, vomiting, palpitations or any other concerning conditions either with or without elevated blood pressure, please return to the ER.    Keep all of your scheduled follow up appointments.       Hypertension, Adult      High blood pressure (hypertension) is when the force of blood pumping through the arteries is too strong. The arteries are the blood vessels that carry blood from the heart throughout the body. Hypertension forces the heart to work harder to pump blood and may cause arteries to become narrow or stiff. Untreated or uncontrolled hypertension can cause a heart attack, heart failure, a stroke, kidney disease, and other problems.    A blood pressure reading consists of a higher number over a lower number. Ideally, your blood pressure should be below 120/80. The first ("top") number is called the systolic pressure. It is a measure of the pressure in your arteries as your heart beats. The second ("bottom") number is called the diastolic pressure. It is a measure of the pressure in your arteries as the heart relaxes.      What are the causes?    The exact cause of this condition is not known. There are some conditions that result in or are related to high blood pressure.      What increases the risk?    Some risk factors for high blood pressure are under your control. The following factors may make you more likely to develop this condition:  •Smoking.      •Having type 2 diabetes mellitus, high cholesterol, or both.      •Not getting enough exercise or physical activity.      •Being overweight.      •Having too much fat, sugar, calories, or salt (sodium) in your diet.      •Drinking too much alcohol.      Some risk factors for high blood pressure may be difficult or impossible to change. Some of these factors include:  •Having chronic kidney disease.      •Having a family history of high blood pressure.      •Age. Risk increases with age.      •Race. You may be at higher risk if you are .      •Gender. Men are at higher risk than women before age 45. After age 65, women are at higher risk than men.      •Having obstructive sleep apnea.      •Stress.        What are the signs or symptoms?    High blood pressure may not cause symptoms. Very high blood pressure (hypertensive crisis) may cause:  •Headache.      •Anxiety.      •Shortness of breath.      •Nosebleed.      •Nausea and vomiting.      •Vision changes.      •Severe chest pain.      •Seizures.        How is this diagnosed?    This condition is diagnosed by measuring your blood pressure while you are seated, with your arm resting on a flat surface, your legs uncrossed, and your feet flat on the floor. The cuff of the blood pressure monitor will be placed directly against the skin of your upper arm at the level of your heart. It should be measured at least twice using the same arm. Certain conditions can cause a difference in blood pressure between your right and left arms.    Certain factors can cause blood pressure readings to be lower or higher than normal for a short period of time:  •When your blood pressure is higher when you are in a health care provider's office than when you are at home, this is called white coat hypertension. Most people with this condition do not need medicines.      •When your blood pressure is higher at home than when you are in a health care provider's office, this is called masked hypertension. Most people with this condition may need medicines to control blood pressure.      If you have a high blood pressure reading during one visit or you have normal blood pressure with other risk factors, you may be asked to:  •Return on a different day to have your blood pressure checked again.      •Monitor your blood pressure at home for 1 week or longer.      If you are diagnosed with hypertension, you may have other blood or imaging tests to help your health care provider understand your overall risk for other conditions.      How is this treated?    This condition is treated by making healthy lifestyle changes, such as eating healthy foods, exercising more, and reducing your alcohol intake. Your health care provider may prescribe medicine if lifestyle changes are not enough to get your blood pressure under control, and if:  •Your systolic blood pressure is above 130.      •Your diastolic blood pressure is above 80.      Your personal target blood pressure may vary depending on your medical conditions, your age, and other factors.      Follow these instructions at home:      Eating and drinking    •Eat a diet that is high in fiber and potassium, and low in sodium, added sugar, and fat. An example eating plan is called the DASH (Dietary Approaches to Stop Hypertension) diet. To eat this way:  •Eat plenty of fresh fruits and vegetables. Try to fill one half of your plate at each meal with fruits and vegetables.      •Eat whole grains, such as whole-wheat pasta, brown rice, or whole-grain bread. Fill about one fourth of your plate with whole grains.      •Eat or drink low-fat dairy products, such as skim milk or low-fat yogurt.      •Avoid fatty cuts of meat, processed or cured meats, and poultry with skin. Fill about one fourth of your plate with lean proteins, such as fish, chicken without skin, beans, eggs, or tofu.      •Avoid pre-made and processed foods. These tend to be higher in sodium, added sugar, and fat.        •Reduce your daily sodium intake. Most people with hypertension should eat less than 1,500 mg of sodium a day.    • Do not drink alcohol if:  •Your health care provider tells you not to drink.      •You are pregnant, may be pregnant, or are planning to become pregnant.      •If you drink alcohol:•Limit how much you use to:  •0–1 drink a day for women.      •0–2 drinks a day for men.        •Be aware of how much alcohol is in your drink. In the [place] one drink equals one 12 oz bottle of beer (355 mL), one 5 oz glass of wine (148 mL), or one 1½ oz glass of hard liquor (44 mL).          Lifestyle      •Work with your health care provider to maintain a healthy body weight or to lose weight. Ask what an ideal weight is for you.      •Get at least 30 minutes of exercise most days of the week. Activities may include walking, swimming, or biking.      •Include exercise to strengthen your muscles (resistance exercise), such as Pilates or lifting weights, as part of your weekly exercise routine. Try to do these types of exercises for 30 minutes at least 3 days a week.      • Do not use any products that contain nicotine or tobacco, such as cigarettes, e-cigarettes, and chewing tobacco. If you need help quitting, ask your health care provider.      •Monitor your blood pressure at home as told by your health care provider.      •Keep all follow-up visits as told by your health care provider. This is important.      Medicines     •Take over-the-counter and prescription medicines only as told by your health care provider. Follow directions carefully. Blood pressure medicines must be taken as prescribed.      • Do not skip doses of blood pressure medicine. Doing this puts you at risk for problems and can make the medicine less effective.      •Ask your health care provider about side effects or reactions to medicines that you should watch for.        Contact a health care provider if you:    •Think you are having a reaction to a medicine you are taking.      •Have headaches that keep coming back (recurring).      •Feel dizzy.      •Have swelling in your ankles.      •Have trouble with your vision.        Get help right away if you:    •Develop a severe headache or confusion.      •Have unusual weakness or numbness.      •Feel faint.      •Have severe pain in your chest or abdomen.      •Vomit repeatedly.      •Have trouble breathing.        Summary    •Hypertension is when the force of blood pumping through your arteries is too strong. If this condition is not controlled, it may put you at risk for serious complications.      •Your personal target blood pressure may vary depending on your medical conditions, your age, and other factors. For most people, a normal blood pressure is less than 120/80.      •Hypertension is treated with lifestyle changes, medicines, or a combination of both. Lifestyle changes include losing weight, eating a healthy, low-sodium diet, exercising more, and limiting alcohol.      This information is not intended to replace advice given to you by your health care provider. Make sure you discuss any questions you have with your health care provider.

## 2022-03-02 NOTE — ED PROVIDER NOTE - OBJECTIVE STATEMENT
71-year-old female with history of CHF, reported EF of 35%, no AICD, paroxysmal A. fib on AC, HTN, presents to the ED with elevated blood pressure.  Patient notes she checks her blood pressure regularly, usually when she feels like something abnormal is going on.  She notes over the last 2 weeks her blood pressure has been quite labile with diastolic blood pressures going from 160s to 180s and as well as 110s.  She denies chest pain, dyspnea, palpitations, lower extremity swelling.  She reports she is compliant with low-sodium diet and all of her medication.  She keeps all of her scheduled follow-up visits with Dr. Richard and Dr. Mahajan.     Today patient's blood pressure was elevated despite taking all of her medication and trying to relax, prompting her to come to the ED.    The patient does note that whenever she has an elevated blood pressure it causes her to feel very anxious.

## 2022-03-04 DIAGNOSIS — Z79.01 LONG TERM (CURRENT) USE OF ANTICOAGULANTS: ICD-10-CM

## 2022-03-04 DIAGNOSIS — I11.0 HYPERTENSIVE HEART DISEASE WITH HEART FAILURE: ICD-10-CM

## 2022-03-04 DIAGNOSIS — R00.1 BRADYCARDIA, UNSPECIFIED: ICD-10-CM

## 2022-03-04 DIAGNOSIS — I50.9 HEART FAILURE, UNSPECIFIED: ICD-10-CM

## 2022-03-04 DIAGNOSIS — I48.0 PAROXYSMAL ATRIAL FIBRILLATION: ICD-10-CM

## 2022-03-04 DIAGNOSIS — I10 ESSENTIAL (PRIMARY) HYPERTENSION: ICD-10-CM

## 2022-03-04 DIAGNOSIS — Z95.810 PRESENCE OF AUTOMATIC (IMPLANTABLE) CARDIAC DEFIBRILLATOR: ICD-10-CM

## 2022-03-23 ENCOUNTER — APPOINTMENT (OUTPATIENT)
Dept: CARDIOLOGY | Facility: CLINIC | Age: 72
End: 2022-03-23
Payer: MEDICARE

## 2022-03-23 VITALS
WEIGHT: 156.5 LBS | BODY MASS INDEX: 29.55 KG/M2 | SYSTOLIC BLOOD PRESSURE: 126 MMHG | OXYGEN SATURATION: 97 % | HEART RATE: 58 BPM | HEIGHT: 61 IN | DIASTOLIC BLOOD PRESSURE: 80 MMHG

## 2022-03-23 PROCEDURE — 93000 ELECTROCARDIOGRAM COMPLETE: CPT

## 2022-03-23 PROCEDURE — 99214 OFFICE O/P EST MOD 30 MIN: CPT

## 2022-03-23 NOTE — ASSESSMENT
[FreeTextEntry1] : 71 year old female patient presenting for follow up.\par \par # Chronic systolic heart failure /Recovered LVEF/ hyperkalemia/ Hypertension\par \par - Euvolemic on exam\par - Continue Entresto 97/103 mg bid \par - Continue amlodipine 2.5 mg daily \par - Continue metoprolol XL 12.5 mg daily \par - Stay off Lokelma, will order BMP to be done in 2 months\par - Eliquis 5 mg BID\par - Encouraged to continue Low sodium diet given it has been effective\par - Follow up with PMD and Dr. Mahajan \par - RTO in 6 months\par \par \par Christo Robertson MD, FACC, [facility] \par Advanced Heart Failure/ Mechanical Circulatory Support\par Pulmonary Hypertension and Cardiac Amyloidosis \par [facility]\par [facility]  \par \par \par

## 2022-03-23 NOTE — HISTORY OF PRESENT ILLNESS
[FreeTextEntry1] : 71 year old female patient presenting for follow up of HF/NICM. She has a history od NICM with LVEF 35% and fully recovered to 60%. Her BP fluctuates from 120-170 mmHg at home. BP sometimes gets better with relaxing techniques such as yoga. I started her on amlodipine 2.5 mg 3 weeks ago. Since then BP has been much better controlled. Otherwise, she has no complaints. No chest pain, SOB, LOC, N/V. She is very active. Most recent blood work on 3/3 showed K 4.5 off Lokelma.

## 2022-03-25 ENCOUNTER — APPOINTMENT (OUTPATIENT)
Dept: CARDIOLOGY | Facility: CLINIC | Age: 72
End: 2022-03-25
Payer: MEDICARE

## 2022-03-25 VITALS
WEIGHT: 150 LBS | HEIGHT: 61 IN | TEMPERATURE: 97.3 F | HEART RATE: 58 BPM | SYSTOLIC BLOOD PRESSURE: 112 MMHG | BODY MASS INDEX: 28.32 KG/M2 | DIASTOLIC BLOOD PRESSURE: 69 MMHG

## 2022-03-25 DIAGNOSIS — I48.19 OTHER PERSISTENT ATRIAL FIBRILLATION: ICD-10-CM

## 2022-03-25 PROCEDURE — 99214 OFFICE O/P EST MOD 30 MIN: CPT

## 2022-03-25 PROCEDURE — 93000 ELECTROCARDIOGRAM COMPLETE: CPT

## 2022-03-25 RX ORDER — SODIUM ZIRCONIUM CYCLOSILICATE 10 G/10G
10 POWDER, FOR SUSPENSION ORAL DAILY
Qty: 30 | Refills: 3 | Status: COMPLETED | COMMUNITY
Start: 2021-03-02 | End: 2022-03-25

## 2022-04-16 PROBLEM — I48.19 PERSISTENT ATRIAL FIBRILLATION: Status: ACTIVE | Noted: 2020-05-08

## 2022-06-24 ENCOUNTER — APPOINTMENT (OUTPATIENT)
Dept: CARDIOLOGY | Facility: CLINIC | Age: 72
End: 2022-06-24
Payer: MEDICARE

## 2022-06-24 PROCEDURE — 93306 TTE W/DOPPLER COMPLETE: CPT

## 2022-06-24 PROCEDURE — ZZZZZ: CPT

## 2022-07-29 ENCOUNTER — APPOINTMENT (OUTPATIENT)
Dept: CARDIOLOGY | Facility: CLINIC | Age: 72
End: 2022-07-29

## 2022-07-29 VITALS
TEMPERATURE: 97.2 F | WEIGHT: 149 LBS | HEIGHT: 61 IN | DIASTOLIC BLOOD PRESSURE: 85 MMHG | HEART RATE: 58 BPM | SYSTOLIC BLOOD PRESSURE: 155 MMHG | BODY MASS INDEX: 28.13 KG/M2

## 2022-07-29 PROCEDURE — 93000 ELECTROCARDIOGRAM COMPLETE: CPT

## 2022-07-29 PROCEDURE — 99214 OFFICE O/P EST MOD 30 MIN: CPT | Mod: 25

## 2022-07-29 RX ORDER — CHOLECALCIFEROL (VITAMIN D3) 25 MCG
TABLET ORAL DAILY
Refills: 0 | Status: COMPLETED | COMMUNITY
End: 2022-07-29

## 2022-07-29 NOTE — ED PROVIDER NOTE - NS_EDPROVIDERDISPOUSERTYPE_ED_A_ED
7/28/2022: TSH 0.389, free T4 1.56, Tg pending, Tg Ab pending. Attending Attestation (For Attendings USE Only)...

## 2022-07-31 NOTE — HISTORY OF PRESENT ILLNESS
[FreeTextEntry1] : Patient with history of hypertension was recently admitted to hospital with new onset of atrial fibrillation with rapid ventricular response and found to have ejection fraction of 30%. Patient also found to have pleural effusions which resulted Lasix. Cardioversion was attempted on while started amiodarone however was unable to maintain normal sinus rhythm.\par Patient was discharged home on amiodarone and metoprolol. Patient had one episode of bradycardia recorded by event monitor and metal probe was decreased. Today patients feels fine does not complain of palpitations. However, patient heart rate is elevated 110 bpm.\par \par Patient is doing well, No bleeding, no CP, no SOB and syncope\par \par EKG SR 51 bpm QTc QTc 430 \par 2/21 - echo EF 60%\par event monior - no AF; HR  (at night clayton) average 54\par normal EF 7/20\par \par 07/28/22: Feels fine. Continues to have Palpitations - c/w AF RVR episodes.\par Denies chest pain, shortness of breath, palpitation, dizziness or LOC except noted above.\par \par EKG (07/28/22): SR

## 2022-07-31 NOTE — ASSESSMENT
[FreeTextEntry1] : ## paroxysmal atrial fibrillation \par ## Nonischemic cardiomyopathy - recovered EF\par \par - - On Eliquis. Compliant. No bleeding issues. Patient to contact us if there is any bleeding issues, interruption or any issues with OAC. Patient to go to ER/call 911 if any major bleeding. Eliquis cost concern. Alternatives discussed. \par - MCOT reviewed. 7% AF burden with longest AF episode ~ 2.5 hours\par - Discussed management options including AAD, Ablation. After detailed discussion, preferred AAD. Will start Flecainide. Rx sent. Aware of NICM- recovered EF. No s/s MI. CCTA in 2020.\par - Continue with Metoprolol. May increase dose if continues to have symptoms\par - - Discussed importance of risk factor management.\par - Sleep study/Management of DOROTHY\par - Diet/exercise/weight loss\par - Management of GERD if present\par - Return in 3 months

## 2022-08-20 NOTE — DISCUSSION/SUMMARY
[FreeTextEntry1] : Impression: History of atrial fibrillation. Given that it is not clear if she is currently in atrial fibrillation and whether or not she is having pauses and sinus rhythm versus atrial fibrillation we'll schedule for 24-hour Holter. I have informed her that it's difficult to proceed without this information as well as also been able to examine her in the office. She is to maintain her current medication. Will schedule and in person office visit after her 24-hour Holter.\par \par Due to the current global fluid 19 pandemic and the recommendations for social distancing this encounter was converted from an end of person face-to-face encounter to a telehealth encounter employing the [employer]. All components of the evaluation and management were performed per clinical routine with the exception of the physical exam. Verbal consent was given on this day by the patient. No

## 2022-09-08 ENCOUNTER — APPOINTMENT (OUTPATIENT)
Dept: CARDIOLOGY | Facility: CLINIC | Age: 72
End: 2022-09-08

## 2022-09-08 VITALS
TEMPERATURE: 97.6 F | RESPIRATION RATE: 16 BRPM | HEIGHT: 61 IN | DIASTOLIC BLOOD PRESSURE: 88 MMHG | SYSTOLIC BLOOD PRESSURE: 140 MMHG | WEIGHT: 153 LBS | HEART RATE: 47 BPM | BODY MASS INDEX: 28.89 KG/M2 | OXYGEN SATURATION: 97 %

## 2022-09-08 DIAGNOSIS — E87.5 HYPERKALEMIA: ICD-10-CM

## 2022-09-08 PROCEDURE — 99214 OFFICE O/P EST MOD 30 MIN: CPT | Mod: 25

## 2022-09-08 PROCEDURE — 93000 ELECTROCARDIOGRAM COMPLETE: CPT

## 2022-09-08 NOTE — ASSESSMENT
[FreeTextEntry1] : 72 year old female patient presenting for follow up.\par \par # Chronic systolic heart failure /Recovered LVEF/ hyperkalemia/ Hypertension\par \par - Euvolemic on exam\par - Continue Entresto 97/103 mg bid \par - Continue amlodipine 2.5 mg daily \par - Continue metoprolol XL 12.5 mg daily \par - Eliquis 5 mg BID\par - Encouraged to continue Low sodium diet given it has been effective\par - Follow up with PMD and EP\par - Blood work in 2 months, will call\par - RTO in 6 months\par \par \par \par \par \par

## 2022-09-08 NOTE — HISTORY OF PRESENT ILLNESS
[FreeTextEntry1] : 72 year old female patient presenting for follow up of HF/NICM. She has a history od NICM with LVEF 35% and fully recovered to 60%. Here for a follow-up. \par \par The patient states she is doing well, has no limitations on her ET. Her SBP has been around 130s and her weight has been stable at 150 lbs. She had palpitations once over the past month that resolved after a couple of hours. She denies CP, bleeding, SOB at rest, PND, abdominal discomfort, LH/dizziness, BLE edema, palpitations, and syncope.  \par \par The patient takes her medications as indicated and follows a low sodium diet.

## 2022-09-22 NOTE — ED PROVIDER NOTE - BIRTH SEX
September 22, 2022          Collette Valdez MD  [facility]  [address]  [id number]  [place] [address]      RE:   Nohelia Wong 1974      Dear Colleague,    Thank you for referring your patient, Nohelia Wong, to [facility] at [facility]. Please see a copy of my visit note below.    Patient returns in ongoing long-term follow-up after abdominal wall reconstruction.  Since the time of her last visit she has completed physical therapy.  She is improving.  She has returned to working out.  She has slowly been regaining confidence in her core musculature.  Things are progressing nicely.  She does still have some intermittent superficial discomfort in the hips bilaterally particularly if she bumps her hips.  Some pins-and-needles sensation in the abdominal wall skin.     On examination: Her incision is well-healed.  There is good approximation of the abdominal wall muscles and no evidence of hernia recurrence.     Overall she is making good strides to long-term recovery.  Needs to continue to work on core strength and rehabilitation.  She feels confident that she is on the right path.  I do as well.  I think at this point she can follow-up with me on an as-needed basis.    Again, thank you for allowing me to participate in the care of your patient.      Sincerely,      John Rosales MD       Female

## 2022-10-27 ENCOUNTER — APPOINTMENT (OUTPATIENT)
Dept: CARDIOLOGY | Facility: CLINIC | Age: 72
End: 2022-10-27

## 2022-10-27 VITALS
WEIGHT: 150 LBS | HEART RATE: 98 BPM | TEMPERATURE: 98 F | SYSTOLIC BLOOD PRESSURE: 125 MMHG | HEIGHT: 61 IN | DIASTOLIC BLOOD PRESSURE: 75 MMHG | BODY MASS INDEX: 28.32 KG/M2 | RESPIRATION RATE: 16 BRPM

## 2022-10-27 PROCEDURE — 99214 OFFICE O/P EST MOD 30 MIN: CPT | Mod: 25

## 2022-10-27 PROCEDURE — 93000 ELECTROCARDIOGRAM COMPLETE: CPT

## 2022-10-27 RX ORDER — MULTIVITAMIN
TABLET ORAL DAILY
Refills: 0 | Status: ACTIVE | COMMUNITY

## 2022-10-27 NOTE — PHYSICAL EXAM
[Normal Oral Mucosa] : normal oral mucosa [No Oral Pallor] : no oral pallor [No Oral Cyanosis] : no oral cyanosis [Normal Jugular Venous A Waves Present] : normal jugular venous A waves present [Normal Jugular Venous V Waves Present] : normal jugular venous V waves present [No Jugular Venous Causey A Waves] : no jugular venous causey A waves [Respiration, Rhythm And Depth] : normal respiratory rhythm and effort [Exaggerated Use Of Accessory Muscles For Inspiration] : no accessory muscle use [Auscultation Breath Sounds / Voice Sounds] : lungs were clear to auscultation bilaterally [Heart Rate And Rhythm] : heart rate and rhythm were normal [Heart Sounds] : normal S1 and S2 [Murmurs] : no murmurs present [Abdomen Soft] : soft [Abdomen Tenderness] : non-tender [Abdomen Mass (___ Cm)] : no abdominal mass palpated [Abnormal Walk] : normal gait [Gait - Sufficient For Exercise Testing] : the gait was sufficient for exercise testing [Nail Clubbing] : no clubbing of the fingernails [Cyanosis, Localized] : no localized cyanosis [Petechial Hemorrhages (___cm)] : no petechial hemorrhages [] : no ischemic changes [Well Developed] : well developed [Well Nourished] : well nourished [No Acute Distress] : no acute distress [Normal Conjunctiva] : normal conjunctiva [Normal Venous Pressure] : normal venous pressure [No Carotid Bruit] : no carotid bruit [Normal S1, S2] : normal S1, S2 [No Murmur] : no murmur [No Rub] : no rub [No Gallop] : no gallop [Clear Lung Fields] : clear lung fields [Good Air Entry] : good air entry [No Respiratory Distress] : no respiratory distress  [Soft] : abdomen soft [Non Tender] : non-tender [No Masses/organomegaly] : no masses/organomegaly [Normal Bowel Sounds] : normal bowel sounds [Normal Gait] : normal gait [No Edema] : no edema [No Cyanosis] : no cyanosis [No Clubbing] : no clubbing [No Varicosities] : no varicosities [No Rash] : no rash [No Skin Lesions] : no skin lesions [Moves all extremities] : moves all extremities [No Focal Deficits] : no focal deficits [Normal Speech] : normal speech [Alert and Oriented] : alert and oriented [Normal memory] : normal memory

## 2022-11-01 ENCOUNTER — NON-APPOINTMENT (OUTPATIENT)
Age: 72
End: 2022-11-01

## 2022-11-16 NOTE — HISTORY OF PRESENT ILLNESS
[FreeTextEntry1] : Patient with history of hypertension was recently admitted to hospital with new onset of atrial fibrillation with rapid ventricular response and found to have ejection fraction of 30%. Patient also found to have pleural effusions which resulted Lasix. Cardioversion was attempted on while started amiodarone however was unable to maintain normal sinus rhythm.\par Patient was discharged home on amiodarone and metoprolol. Patient had one episode of bradycardia recorded by event monitor and metal probe was decreased. Today patients feels fine does not complain of palpitations. However, patient heart rate is elevated 110 bpm.\par \par Patient is doing well, no complains of palpitations or shortness of breath. Recent visit, patient was started on Flecainide due to 7% of AF burden. Currently patient feels fine.  \par \par \par EKG SR 51 bpm QTc QTc 430 \par 2/21 - echo EF 60%\par event monior - no AF; HR  (at night clayton) average 54\par normal EF 7/20\par \par \par - EKG (10/27/2022) sinus rhythm 53 BPM with left axis deviation. \par - Event monitor performed (7/2022) showed 7% AF burden, longest episode for two-and-a-half hours.   \par \par Patient is doing well, no complains of palpitations or shortness of breath. Recent visit, patient was started on Flecainide due to 7% of AF burden. Currently patient feels fine.

## 2022-11-16 NOTE — ASSESSMENT
[FreeTextEntry1] : Bradycardia - asymptotic doing well; HR impoved\par - event monior [address] rate of 54 bpm\par - cont BB low dose - will decrease \par \par AF\par - Currently patient is in normal sinus rhythm and is doing well.\par -  Stop Flecainide due to possible side effects. \par - Continue Metoprolol.\par - Continue Eliquis 5 mg Q12, no bleeding. \par - Discussed with patient the possibility of an Ablation; if patient's continues to have increased AF burden.\par - Will obtain MCOT at next visit, while off Flecainide.       \par \par CHF\par - Patient is euvolemic.\par - Continue Entresto  mg BID.\par \par Hypertension\par - Patient well controlled. Continue Amlodipine 2.5 mg QD.

## 2022-11-16 NOTE — ADDENDUM
[FreeTextEntry1] : I, Michelle Davis assisted in documentation on 10/27/2022  acting as a scribe for Dr. Ashvin Mahajan.\par

## 2023-02-23 ENCOUNTER — APPOINTMENT (OUTPATIENT)
Dept: ELECTROPHYSIOLOGY | Facility: CLINIC | Age: 73
End: 2023-02-23

## 2023-03-10 ENCOUNTER — APPOINTMENT (OUTPATIENT)
Dept: CARDIOLOGY | Facility: CLINIC | Age: 73
End: 2023-03-10

## 2023-03-10 ENCOUNTER — APPOINTMENT (OUTPATIENT)
Dept: CARDIOLOGY | Facility: CLINIC | Age: 73
End: 2023-03-10
Payer: MEDICARE

## 2023-03-10 VITALS
HEIGHT: 61 IN | OXYGEN SATURATION: 98 % | SYSTOLIC BLOOD PRESSURE: 120 MMHG | BODY MASS INDEX: 29.27 KG/M2 | HEART RATE: 49 BPM | WEIGHT: 155 LBS | DIASTOLIC BLOOD PRESSURE: 78 MMHG

## 2023-03-10 DIAGNOSIS — R00.1 BRADYCARDIA, UNSPECIFIED: ICD-10-CM

## 2023-03-10 PROCEDURE — 93000 ELECTROCARDIOGRAM COMPLETE: CPT

## 2023-03-10 PROCEDURE — 99214 OFFICE O/P EST MOD 30 MIN: CPT | Mod: 25

## 2023-03-16 PROBLEM — R00.1 BRADYCARDIA: Status: ACTIVE | Noted: 2021-02-05

## 2023-03-20 NOTE — HISTORY OF PRESENT ILLNESS
[FreeTextEntry1] : 72 year old female patient presenting for follow up of HF/NICM. She has a history od NICM with LVEF 35% and fully recovered to 60%. Here for a follow-up. \par \par The patient reports doing well, has no limitations on her ET, she is swimming 5 days a week without any SOB. Her SBP has been at 150s and her weight has been stable at 152 lbs. She denies CP, bleeding, SOB at rest, PND, abdominal discomfort, LH/dizziness, BLE edema, palpitations, and syncope. She takes her medications as indicated and follows a low sodium diet.

## 2023-03-20 NOTE — ASSESSMENT
[FreeTextEntry1] : 72 year old female patient presenting for follow up.\par \par # Chronic systolic heart failure /Recovered LVEF/ hyperkalemia/ Hypertension\par \par - Euvolemic on exam\par - Continue Entresto 97/103 mg bid \par - Increase Amlodipine to 5 mg daily \par - Continue Metoprolol XL 12.5 mg daily \par - Eliquis 5 mg BID\par - Encouraged to continue Low sodium diet given it has been effective\par - Follow up with PMD and EP\par - RTO in 6 months\par \par \par \par Christo Robertson MD, FACC, FSA \par Advanced Heart Failure/ Mechanical Circulatory Support\par Pulmonary Hypertension and Cardiac Amyloidosis \par [facility]\par [facility]  \par \par \par \par \par

## 2023-03-21 NOTE — ED ADULT NURSE NOTE - SUICIDE SCREENING QUESTION 2
Any 1 paged at 0930  pt blood sugar 344, none of his morning meds are ordered or insulin. Please advise 3rd page.    No

## 2023-07-21 ENCOUNTER — APPOINTMENT (OUTPATIENT)
Dept: CARDIOLOGY | Facility: CLINIC | Age: 73
End: 2023-07-21
Payer: MEDICARE

## 2023-07-21 VITALS
BODY MASS INDEX: 28.32 KG/M2 | HEIGHT: 61 IN | SYSTOLIC BLOOD PRESSURE: 116 MMHG | WEIGHT: 150 LBS | HEART RATE: 53 BPM | OXYGEN SATURATION: 97 % | DIASTOLIC BLOOD PRESSURE: 80 MMHG

## 2023-07-21 PROCEDURE — 99214 OFFICE O/P EST MOD 30 MIN: CPT | Mod: 25

## 2023-07-21 PROCEDURE — 93000 ELECTROCARDIOGRAM COMPLETE: CPT

## 2023-07-21 RX ORDER — FLECAINIDE ACETATE 50 MG/1
50 TABLET ORAL
Qty: 180 | Refills: 1 | Status: DISCONTINUED | COMMUNITY
Start: 2022-07-29 | End: 2023-07-21

## 2023-07-27 LAB
ALBUMIN SERPL ELPH-MCNC: 4.6 G/DL
ALP BLD-CCNC: 62 U/L
ALT SERPL-CCNC: 14 U/L
ANION GAP SERPL CALC-SCNC: 13 MMOL/L
AST SERPL-CCNC: 29 U/L
BILIRUB SERPL-MCNC: 0.5 MG/DL
BUN SERPL-MCNC: 17 MG/DL
CALCIUM SERPL-MCNC: 9.9 MG/DL
CHLORIDE SERPL-SCNC: 105 MMOL/L
CO2 SERPL-SCNC: 21 MMOL/L
CREAT SERPL-MCNC: 0.8 MG/DL
EGFR: 77 ML/MIN/1.73M2
GLUCOSE SERPL-MCNC: 115 MG/DL
NT-PROBNP SERPL-MCNC: 132 PG/ML
POTASSIUM SERPL-SCNC: 4.8 MMOL/L
PROT SERPL-MCNC: 6.8 G/DL
SODIUM SERPL-SCNC: 139 MMOL/L

## 2023-07-28 NOTE — ASSESSMENT
[FreeTextEntry1] : 72 year old female patient presenting for follow up.\par \par # Chronic systolic heart failure /Recovered LVEF/ hyperkalemia/ Hypertension\par \par - Euvolemic on exam\par - Continue Entresto 97/103 mg bid \par - Continue Amlodipine 5 mg daily \par - Continue Metoprolol XL 12.5 mg daily \par - Eliquis 5 mg BID\par - Encouraged to continue Low sodium diet given it has been effective\par - Blood work today\par - Follow up with PMD and EP\par - RTO in 6 months\par \par \par Christo Robertson MD, FACC, FSA \par Advanced Heart Failure/ Mechanical Circulatory Support\par Pulmonary Hypertension and Cardiac Amyloidosis \par [facility]\par [facility]  \par \par \par \par \par

## 2023-07-28 NOTE — HISTORY OF PRESENT ILLNESS
[FreeTextEntry1] : 73 year old female patient presenting for follow up of HF/NICM. She has a history od NICM with LVEF 35% and fully recovered to 60%. Here for a follow-up. \par \par The patient reports doing well; she has no limitations on her ET, walks over 30 mins daily, climbs stairs, and swims a couple of days a week without SOB. Her SBP has been at 150s, and her weight has been stable at 152 lbs. She denies CP, bleeding, SOB at rest, PND, abdominal discomfort, LH/dizziness, BLE edema, palpitations, and syncope. She takes her medications as indicated and follows a low-sodium diet.

## 2023-10-06 RX ORDER — APIXABAN 5 MG/1
5 TABLET, FILM COATED ORAL
Qty: 180 | Refills: 3 | Status: ACTIVE | COMMUNITY
Start: 1900-01-01 | End: 1900-01-01

## 2024-01-05 ENCOUNTER — APPOINTMENT (OUTPATIENT)
Dept: CARDIOLOGY | Facility: CLINIC | Age: 74
End: 2024-01-05
Payer: MEDICARE

## 2024-01-05 VITALS
SYSTOLIC BLOOD PRESSURE: 112 MMHG | HEART RATE: 56 BPM | DIASTOLIC BLOOD PRESSURE: 74 MMHG | BODY MASS INDEX: 29.64 KG/M2 | OXYGEN SATURATION: 97 % | WEIGHT: 157 LBS | HEIGHT: 61 IN

## 2024-01-05 DIAGNOSIS — I48.91 UNSPECIFIED ATRIAL FIBRILLATION: ICD-10-CM

## 2024-01-05 DIAGNOSIS — I50.22 CHRONIC SYSTOLIC (CONGESTIVE) HEART FAILURE: ICD-10-CM

## 2024-01-05 DIAGNOSIS — I10 ESSENTIAL (PRIMARY) HYPERTENSION: ICD-10-CM

## 2024-01-05 DIAGNOSIS — I48.0 PAROXYSMAL ATRIAL FIBRILLATION: ICD-10-CM

## 2024-01-05 PROCEDURE — 99214 OFFICE O/P EST MOD 30 MIN: CPT | Mod: 25

## 2024-01-05 PROCEDURE — 93000 ELECTROCARDIOGRAM COMPLETE: CPT

## 2024-01-19 RX ORDER — AMLODIPINE BESYLATE 5 MG/1
5 TABLET ORAL DAILY
Qty: 60 | Refills: 3 | Status: ACTIVE | COMMUNITY
Start: 2022-03-02 | End: 1900-01-01

## 2024-01-19 RX ORDER — SACUBITRIL AND VALSARTAN 97; 103 MG/1; MG/1
97-103 TABLET, FILM COATED ORAL
Qty: 60 | Refills: 6 | Status: ACTIVE | COMMUNITY
Start: 2021-09-28 | End: 1900-01-01

## 2024-01-23 PROBLEM — I50.22 CHRONIC SYSTOLIC CONGESTIVE HEART FAILURE: Status: ACTIVE | Noted: 2020-05-08

## 2024-01-23 PROBLEM — I10 UNCONTROLLED HYPERTENSION: Status: ACTIVE | Noted: 2020-12-28

## 2024-01-23 PROBLEM — I48.0 PAROXYSMAL ATRIAL FIBRILLATION: Status: ACTIVE | Noted: 2020-07-29

## 2024-01-23 PROBLEM — I48.91 ATRIAL FIBRILLATION: Status: ACTIVE | Noted: 2020-06-04

## 2024-01-23 NOTE — HISTORY OF PRESENT ILLNESS
[FreeTextEntry1] : 73 year old female patient presenting for follow up of HF/NICM. She has a history od NICM with LVEF 35% and fully recovered to 60%. Here for a follow-up.   The patient reports doing well, she keeps herself busy watching her grandchildren. She denies any SOB, LOC, N/V.  Her weight is unchanged, no side effects from her meds. Blood pressure is under control.

## 2024-01-23 NOTE — ASSESSMENT
[FreeTextEntry1] : # Chronic systolic heart failure /Recovered LVEF/ Hypertension  - Euvolemic on exam - Continue Entresto 97/103 mg bid - Plan to discontinue Amlodipine per patient's request. She understands that her blood pressure could get out of control  - Continue Metoprolol XL 12.5 mg daily - Eliquis 5 mg BID - Encouraged to continue Low sodium diet given it has been effective - Plan for ECHO in 2 months - RTO in 6 months   Christo Robertson MD, FACC, FSA  Advanced Heart Failure/ Mechanical Circulatory Support Pulmonary Hypertension and Cardiac Amyloidosis  Health system

## 2024-04-18 ENCOUNTER — APPOINTMENT (OUTPATIENT)
Dept: CARDIOLOGY | Facility: CLINIC | Age: 74
End: 2024-04-18
Payer: MEDICARE

## 2024-04-18 PROCEDURE — 93306 TTE W/DOPPLER COMPLETE: CPT

## 2024-04-23 RX ORDER — METOPROLOL SUCCINATE 25 MG/1
25 TABLET, EXTENDED RELEASE ORAL
Qty: 60 | Refills: 5 | Status: ACTIVE | COMMUNITY
Start: 1900-01-01 | End: 1900-01-01

## 2024-08-16 ENCOUNTER — APPOINTMENT (OUTPATIENT)
Dept: HEART FAILURE | Facility: CLINIC | Age: 74
End: 2024-08-16

## 2024-08-16 VITALS
HEART RATE: 59 BPM | WEIGHT: 147 LBS | HEIGHT: 61 IN | DIASTOLIC BLOOD PRESSURE: 78 MMHG | BODY MASS INDEX: 27.75 KG/M2 | SYSTOLIC BLOOD PRESSURE: 124 MMHG | OXYGEN SATURATION: 91 %

## 2024-08-16 DIAGNOSIS — I50.22 CHRONIC SYSTOLIC (CONGESTIVE) HEART FAILURE: ICD-10-CM

## 2024-08-16 DIAGNOSIS — I48.19 OTHER PERSISTENT ATRIAL FIBRILLATION: ICD-10-CM

## 2024-08-16 DIAGNOSIS — I27.20 PULMONARY HYPERTENSION, UNSPECIFIED: ICD-10-CM

## 2024-08-16 DIAGNOSIS — I48.91 UNSPECIFIED ATRIAL FIBRILLATION: ICD-10-CM

## 2024-08-16 PROCEDURE — G2211 COMPLEX E/M VISIT ADD ON: CPT

## 2024-08-16 PROCEDURE — 99214 OFFICE O/P EST MOD 30 MIN: CPT

## 2024-08-16 PROCEDURE — 93000 ELECTROCARDIOGRAM COMPLETE: CPT

## 2024-08-16 NOTE — PHYSICAL EXAM
[Well Developed] : well developed [Well Nourished] : well nourished [No Acute Distress] : no acute distress [Normal] : soft, non-tender, no masses/organomegaly, normal bowel sounds

## 2024-08-21 NOTE — ASSESSMENT
[FreeTextEntry1] : # Chronic systolic heart failure /Recovered LVEF/ Hypertension  - Euvolemic on exam - Continue Entresto 97/103 mg bid - Continue Amlodipine 5 mg daily - She stopped it in the past but BP went back up     - Continue Metoprolol XL 12.5 mg daily - Eliquis 5 mg BID - Encouraged to continue Low sodium diet given it has been effective - Referral to preventive cardiology - RTO in 12 months   Christo Robertson MD, FACC, [facility]  Advanced Heart Failure/ Mechanical Circulatory Support Pulmonary Hypertension and Cardiac Amyloidosis  [facility]

## 2024-08-21 NOTE — HISTORY OF PRESENT ILLNESS
[FreeTextEntry1] : 73 year old female patient presenting for follow up of HF/NICM. She has a history od NICM with LVEF 35% and fully recovered to 60%. Here for a follow-up.   The patient reports doing well, she keeps herself busy watching her grandchildren. She denies any SOB, LOC, N/V.  Her weight is unchanged, no side effects from her meds. Blood pressure is under control.     08/16/2024: Patient presents for F/U visit on management of diastolic heart failure and pulmonary hypertension. She feels well, no limitations on exertion. [place] [address] daily. Her BP has been stable, she continued Amlodipine. She is not on diuretics. Lost 10 lbs since the last visit, had intestinal virus recently.  Patient remains in SR, s/p Afib ablation in the past. Severe diastolic dysfunction grade III on recent TTE, EF 57%, PASP 37 mmHg. Cr 0.71 K 4.6 BUN 14 as of 06/2024. Patient has elevated .

## 2024-08-21 NOTE — CARDIOLOGY SUMMARY
[de-identified] : TTE 04/18/2024 1. Left ventricular cavity is mildly dilated. Left ventricular systolic function is normal with an ejection fraction of 57 % by Marquez's method of disks. There is severe (grade 3) left ventricular diastolic dysfunction. 2. Normal right ventricular cavity size and normal systolic function. 3. The left atrium is moderately dilated. 4. Mild pulmonic regurgitation. 5. Estimated pulmonary artery systolic pressure is 37 mmHg, consistent with borderline pulmonary hypertension. 6. Ascending aorta diameter is dilated, measuring 3.70 cm (indexed 2.21 cm/m). 7. Compared to the transthoracic echocardiogram performed on 6/24/2022, the current study demonstrates diastolic dysfunction. 25-Dec-2018 04:02

## 2024-08-21 NOTE — CARDIOLOGY SUMMARY
[de-identified] : TTE 04/18/2024 1. Left ventricular cavity is mildly dilated. Left ventricular systolic function is normal with an ejection fraction of 57 % by Marquez's method of disks. There is severe (grade 3) left ventricular diastolic dysfunction. 2. Normal right ventricular cavity size and normal systolic function. 3. The left atrium is moderately dilated. 4. Mild pulmonic regurgitation. 5. Estimated pulmonary artery systolic pressure is 37 mmHg, consistent with borderline pulmonary hypertension. 6. Ascending aorta diameter is dilated, measuring 3.70 cm (indexed 2.21 cm/m). 7. Compared to the transthoracic echocardiogram performed on 6/24/2022, the current study demonstrates diastolic dysfunction.

## 2024-08-21 NOTE — HISTORY OF PRESENT ILLNESS
[FreeTextEntry1] : 73 year old female patient presenting for follow up of HF/NICM. She has a history od NICM with LVEF 35% and fully recovered to 60%. Here for a follow-up.   The patient reports doing well, she keeps herself busy watching her grandchildren. She denies any SOB, LOC, N/V.  Her weight is unchanged, no side effects from her meds. Blood pressure is under control.     08/16/2024: Patient presents for F/U visit on management of diastolic heart failure and pulmonary hypertension. She feels well, no limitations on exertion. [place] [address] daily. Her BP has been stable, she continued Amlodipine. She is not on diuretics. Lost 10 lbs since the last visit, had intestinal virus recently.  Patient remains in SR, s/p Afib ablation in the past. Severe diastolic dysfunction grade III on recent TTE, EF 57%, PASP 37 mmHg. Cr 0.71 K 4.6 BUN 14 as of 06/2024. Patient has elevated .

## 2024-08-21 NOTE — ASSESSMENT
[FreeTextEntry1] : # Chronic systolic heart failure /Recovered LVEF/ Hypertension  - Euvolemic on exam - Continue Entresto 97/103 mg bid - Continue Amlodipine 5 mg daily - She stopped it in the past but BP went back up     - Continue Metoprolol XL 12.5 mg daily - Eliquis 5 mg BID - Encouraged to continue Low sodium diet given it has been effective - Referral to preventive cardiology - RTO in 12 months   Christo Robertson MD, FACC, [facility]  Advanced Heart Failure/ Mechanical Circulatory Support Pulmonary Hypertension and Cardiac Amyloidosis  [facility]

## 2024-09-16 ENCOUNTER — APPOINTMENT (OUTPATIENT)
Dept: CARDIOLOGY | Facility: CLINIC | Age: 74
End: 2024-09-16
Payer: MEDICARE

## 2024-09-16 VITALS
HEIGHT: 61 IN | BODY MASS INDEX: 27.94 KG/M2 | HEART RATE: 45 BPM | SYSTOLIC BLOOD PRESSURE: 134 MMHG | DIASTOLIC BLOOD PRESSURE: 82 MMHG | WEIGHT: 148 LBS

## 2024-09-16 DIAGNOSIS — I50.22 CHRONIC SYSTOLIC (CONGESTIVE) HEART FAILURE: ICD-10-CM

## 2024-09-16 DIAGNOSIS — I27.20 PULMONARY HYPERTENSION, UNSPECIFIED: ICD-10-CM

## 2024-09-16 DIAGNOSIS — Z78.9 OTHER SPECIFIED HEALTH STATUS: ICD-10-CM

## 2024-09-16 DIAGNOSIS — I48.91 UNSPECIFIED ATRIAL FIBRILLATION: ICD-10-CM

## 2024-09-16 DIAGNOSIS — I50.30 UNSPECIFIED DIASTOLIC (CONGESTIVE) HEART FAILURE: ICD-10-CM

## 2024-09-16 PROCEDURE — 99214 OFFICE O/P EST MOD 30 MIN: CPT | Mod: 25

## 2024-09-16 PROCEDURE — 93000 ELECTROCARDIOGRAM COMPLETE: CPT

## 2024-09-16 RX ORDER — ROSUVASTATIN CALCIUM 10 MG/1
10 TABLET, FILM COATED ORAL DAILY
Refills: 0 | Status: ACTIVE | COMMUNITY

## 2024-09-16 NOTE — CARDIOLOGY SUMMARY
[de-identified] : 9/16/24: SR 45bpm, nonspecific T wave abnormality, LAD. [de-identified] : TTE 04/18/2024 1. Left ventricular cavity is mildly dilated. Left ventricular systolic function is normal with an ejection fraction of 57 % by Marquez's method of disks. There is severe (grade 3) left ventricular diastolic dysfunction. 2. Normal right ventricular cavity size and normal systolic function. 3. The left atrium is moderately dilated. 4. Mild pulmonic regurgitation. 5. Estimated pulmonary artery systolic pressure is 37 mmHg, consistent with borderline pulmonary hypertension. 6. Ascending aorta diameter is dilated, measuring 3.70 cm (indexed 2.21 cm/m). 7. Compared to the transthoracic echocardiogram performed on 6/24/2022, the current study demonstrates diastolic dysfunction.

## 2024-09-16 NOTE — HISTORY OF PRESENT ILLNESS
[FreeTextEntry1] : 74 year old female PMHx: HF/NICM. She has a history od NICM with LVEF 35% and fully recovered to 60%, a fib s/p ablation at [facility] 2023, pulmonary HTN, HTN  EP: MD Parson [facility]   Patient presents to establish primary cardiac care. Pt referred by MD Richard. Denies CP, SOB. palpitations, dizziness, syncope. No recent hospitalizations.  Echo 4/18/24: LVEF 57%, LA mildly dilated, mild CA, pulmonary artery pressure 37mmHg, ascending aorta is dilated measuring 3.70  6/12/24:  GFR 88  Trig 128 HDL 67  A1c 5.6 TSH 2.69.

## 2024-09-16 NOTE — ASSESSMENT
[FreeTextEntry1] : 74 year old female PMHx: HF/NICM. She has a history of NICM with LVEF 35% and fully recovered to 60%, A-fib s/p ablation at [facility] 2023, pulmonary HTN, HTN. EP: MD Parson [facility]  A-fib - in SR since ablation in 2023 HF/pulmonary HTN follows with MD Richard. Unclear etiology of severe diastolic dysfunction after recovery of systolic function. Normal coronary arteries in 2020. Treatment for HLD started recently.  Recommend:  Continue Eliquis. Continue Entresto and Toprol. Will monitor sinus bradycardia. Coronary calcium score would not add any useful information at this point. Cardiac MRI to r/o infiltrative cardiomyopathy. F/u after the test.  Darin Núñez MD

## 2024-09-16 NOTE — REVIEW OF SYSTEMS
[Blurry Vision] : no blurred vision [Myalgia] : no myalgia [Anxiety] : no anxiety [Negative] : Neurological

## 2025-01-15 ENCOUNTER — APPOINTMENT (OUTPATIENT)
Dept: OTOLARYNGOLOGY | Facility: CLINIC | Age: 75
End: 2025-01-15
Payer: MEDICARE

## 2025-01-15 ENCOUNTER — NON-APPOINTMENT (OUTPATIENT)
Age: 75
End: 2025-01-15

## 2025-01-15 VITALS — WEIGHT: 152 LBS | BODY MASS INDEX: 28.7 KG/M2 | HEIGHT: 61 IN

## 2025-01-15 DIAGNOSIS — H93.11 TINNITUS, RIGHT EAR: ICD-10-CM

## 2025-01-15 DIAGNOSIS — H90.3 SENSORINEURAL HEARING LOSS, BILATERAL: ICD-10-CM

## 2025-01-15 DIAGNOSIS — H61.21 IMPACTED CERUMEN, RIGHT EAR: ICD-10-CM

## 2025-01-15 PROCEDURE — 92550 TYMPANOMETRY & REFLEX THRESH: CPT | Mod: 52

## 2025-01-15 PROCEDURE — 92557 COMPREHENSIVE HEARING TEST: CPT

## 2025-01-15 PROCEDURE — 99204 OFFICE O/P NEW MOD 45 MIN: CPT | Mod: 25

## 2025-01-15 PROCEDURE — 69210 REMOVE IMPACTED EAR WAX UNI: CPT | Mod: RT

## 2025-01-15 RX ORDER — CHROMIUM 200 MCG
TABLET ORAL
Refills: 0 | Status: ACTIVE | COMMUNITY

## 2025-01-27 ENCOUNTER — APPOINTMENT (OUTPATIENT)
Dept: OTOLARYNGOLOGY | Facility: CLINIC | Age: 75
End: 2025-01-27
Payer: MEDICARE

## 2025-01-27 PROCEDURE — V5010 ASSESSMENT FOR HEARING AID: CPT | Mod: NC

## 2025-08-22 ENCOUNTER — APPOINTMENT (OUTPATIENT)
Dept: HEART FAILURE | Facility: CLINIC | Age: 75
End: 2025-08-22
Payer: MEDICARE

## 2025-08-22 VITALS
WEIGHT: 153 LBS | HEIGHT: 61 IN | SYSTOLIC BLOOD PRESSURE: 110 MMHG | HEART RATE: 51 BPM | OXYGEN SATURATION: 97 % | DIASTOLIC BLOOD PRESSURE: 78 MMHG | BODY MASS INDEX: 28.89 KG/M2

## 2025-08-22 DIAGNOSIS — R00.1 BRADYCARDIA, UNSPECIFIED: ICD-10-CM

## 2025-08-22 DIAGNOSIS — I50.22 CHRONIC SYSTOLIC (CONGESTIVE) HEART FAILURE: ICD-10-CM

## 2025-08-22 PROCEDURE — 93000 ELECTROCARDIOGRAM COMPLETE: CPT

## 2025-08-22 PROCEDURE — 99213 OFFICE O/P EST LOW 20 MIN: CPT | Mod: 25
